# Patient Record
Sex: FEMALE | Race: WHITE | NOT HISPANIC OR LATINO | Employment: OTHER | URBAN - METROPOLITAN AREA
[De-identification: names, ages, dates, MRNs, and addresses within clinical notes are randomized per-mention and may not be internally consistent; named-entity substitution may affect disease eponyms.]

---

## 2017-02-06 ENCOUNTER — LAB VISIT (OUTPATIENT)
Dept: LAB | Facility: HOSPITAL | Age: 42
End: 2017-02-06
Attending: NURSE PRACTITIONER
Payer: MEDICARE

## 2017-02-06 DIAGNOSIS — E03.4 HYPOTHYROIDISM DUE TO ACQUIRED ATROPHY OF THYROID: ICD-10-CM

## 2017-02-06 PROCEDURE — 36415 COLL VENOUS BLD VENIPUNCTURE: CPT | Mod: PO

## 2017-02-06 PROCEDURE — 84443 ASSAY THYROID STIM HORMONE: CPT

## 2017-02-07 LAB — TSH SERPL DL<=0.005 MIU/L-ACNC: 2.71 UIU/ML

## 2017-02-07 RX ORDER — LEVOTHYROXINE SODIUM 137 UG/1
137 TABLET ORAL DAILY
Qty: 30 TABLET | Refills: 1 | Status: SHIPPED | OUTPATIENT
Start: 2017-02-07 | End: 2017-07-07 | Stop reason: SDUPTHER

## 2017-06-27 ENCOUNTER — OFFICE VISIT (OUTPATIENT)
Dept: FAMILY MEDICINE | Facility: CLINIC | Age: 42
End: 2017-06-27
Payer: MEDICARE

## 2017-06-27 VITALS
WEIGHT: 194 LBS | HEIGHT: 64 IN | BODY MASS INDEX: 33.12 KG/M2 | DIASTOLIC BLOOD PRESSURE: 80 MMHG | SYSTOLIC BLOOD PRESSURE: 152 MMHG | HEART RATE: 68 BPM | RESPIRATION RATE: 12 BRPM | TEMPERATURE: 98 F

## 2017-06-27 DIAGNOSIS — R10.31 RLQ ABDOMINAL PAIN: Primary | ICD-10-CM

## 2017-06-27 DIAGNOSIS — R19.7 DIARRHEA, UNSPECIFIED TYPE: ICD-10-CM

## 2017-06-27 PROCEDURE — 99214 OFFICE O/P EST MOD 30 MIN: CPT | Mod: S$GLB,,, | Performed by: NURSE PRACTITIONER

## 2017-06-27 NOTE — PROGRESS NOTES
"Subjective:       Patient ID: Jamila Lee is a 41 y.o. female.    Chief Complaint: one week lower abdomen pain, diarrhea with bad odor    HPI had her gallbladder removed a year ago. Since then having lower abdomen with cramping. This past week has had intense pain, cramping and diarrhea. States for the past week the minute she eats she has diarrhea. States watery diarrhea. States the smell is really bad. She states she was not exposed to anyone that has had diarrhea or illness. She has not been on antibiotics or steroids lately. A lot of nausea and a few episodes of vomiting. No urinary changes. No fever. She is not taking any medication for this. No blood in her stools. No other concerns. See ROS.    The following portion of the patients history was reviewed and updated as appropriate: allergies, current medications, past medical and surgical history. Past social history and problem list reviewed. Family PMH and Past social history reviewed. Tobacco, Illicit drug use reviewed.     Review of Systems  Constitutional: No fatigue or fever    Respiratory:   No SOB, Wheezing, cough  Cardiovascular:   No CP, Palpitations  Gastrointestinal:   See HPI  Genitourinary:   No dysuria, urgency or frequency. No change in bowels. No blood in stools.   Musculoskeletal:   No joint pain  No change in gait or coordination. .  Neurological:   No dizziness. No headaches  Hematological: No abnormal bruising or bleeding      Objective:     BP (!) 152/80 (BP Location: Left arm, Patient Position: Sitting, BP Method: Manual)   Pulse 68   Temp 98.3 °F (36.8 °C) (Oral)   Resp 12   Ht 5' 4" (1.626 m)   Wt 88 kg (194 lb 0.1 oz)   BMI 33.30 kg/m²      Physical Exam     Constitutional: oriented to person, place, and time. well-developed and well-nourished. She does not appear ill  Cardiovascular: Normal rate, regular rhythm and normal heart sounds.    Pulmonary/Chest: Effort normal and breath sounds normal. No respiratory distress. No " wheezes.   Abdominal: Soft. Bowel sounds are normal. No distension. Generalized tenderness  Neurological: oriented to person, place, and time.   Skin: Skin is warm and dry. No rashes or lesions  Psychiatric: Normal mood and affect.Behavior is normal. Judgment and thought content normal.   Assessment:       1. RLQ abdominal pain    2. Diarrhea, unspecified type        Plan:     Jamila was seen today for one week lower abdomen pain, diarrhea with bad odor.    Diagnoses and all orders for this visit:    RLQ abdominal pain:  Will get CT of the abdomen.   -      CT Abdomen Pelvis With Contrast; Future    Diarrhea, unspecified type: bland diet. Hydrate well. No medications at this time until CT results available.  -     CT Abdomen Pelvis With Contrast; Future    Continue current medication  Take medications only as prescribed  Healthy diet  Adequate rest  Adequate hydration  Avoid allergens  Avoid excessive caffeine

## 2017-06-28 ENCOUNTER — HOSPITAL ENCOUNTER (OUTPATIENT)
Dept: RADIOLOGY | Facility: HOSPITAL | Age: 42
Discharge: HOME | End: 2017-06-28
Attending: NURSE PRACTITIONER
Payer: MEDICARE

## 2017-06-28 DIAGNOSIS — R10.31 RLQ ABDOMINAL PAIN: ICD-10-CM

## 2017-06-28 DIAGNOSIS — R19.7 DIARRHEA, UNSPECIFIED TYPE: ICD-10-CM

## 2017-06-28 PROCEDURE — 74177 CT ABD & PELVIS W/CONTRAST: CPT | Mod: 26,,, | Performed by: RADIOLOGY

## 2017-06-28 PROCEDURE — 25500020 PHARM REV CODE 255: Mod: PO | Performed by: NURSE PRACTITIONER

## 2017-06-28 PROCEDURE — 74177 CT ABD & PELVIS W/CONTRAST: CPT | Mod: TC,PO

## 2017-06-28 RX ADMIN — IOHEXOL 30 ML: 350 INJECTION, SOLUTION INTRAVENOUS at 03:06

## 2017-06-28 RX ADMIN — IOHEXOL 100 ML: 350 INJECTION, SOLUTION INTRAVENOUS at 03:06

## 2017-06-29 ENCOUNTER — TELEPHONE (OUTPATIENT)
Dept: FAMILY MEDICINE | Facility: CLINIC | Age: 42
End: 2017-06-29

## 2017-06-29 DIAGNOSIS — K52.9 COLITIS: Primary | ICD-10-CM

## 2017-06-29 DIAGNOSIS — R10.30 LOWER ABDOMINAL PAIN: Primary | ICD-10-CM

## 2017-06-29 RX ORDER — PREDNISONE 20 MG/1
TABLET ORAL
Qty: 6 TABLET | Refills: 0 | Status: SHIPPED | OUTPATIENT
Start: 2017-06-29 | End: 2017-07-07 | Stop reason: DRUGHIGH

## 2017-06-29 NOTE — TELEPHONE ENCOUNTER
----- Message from Germaine Wesley NP sent at 6/29/2017  1:52 PM CDT -----  I want to see her Wednesday if she is not feeling better.

## 2017-06-29 NOTE — TELEPHONE ENCOUNTER
Spoke with pt. Pt stated as of right now, she is not feeling better and if she's not better, she would call to schedule to come in Wednesday.

## 2017-06-29 NOTE — TELEPHONE ENCOUNTER
----- Message from Rg Farmer sent at 6/29/2017  3:47 PM CDT -----  Contact: pt  Pt returning call, call placed to pod no answer  Call Back#[phone]  Thanks

## 2017-06-29 NOTE — TELEPHONE ENCOUNTER
Her CT showed that she has colitis, inflammation of the colon. She needs to see someone in the GI clinic and also needs to be on some steroids to decrease the inflammation. She also has two hernias. 1 Fat containing periumbilical hernia and 1 right upper abdomen fat-containing hernia. These are small and can be addressed at a later date. If her abdominal pain gets worse she needs to go to the ER. If she starts having blood in her stool or running fever she needs to go to the ER. I will send medication to the pharmacy and place referral.

## 2017-06-29 NOTE — TELEPHONE ENCOUNTER
----- Message from Janice Mondragon sent at 6/29/2017  9:35 AM CDT -----  Contact: Patient  Patient would like results of her test from yesterday. Please call her at [phone].

## 2017-06-29 NOTE — TELEPHONE ENCOUNTER
"Returned pt call. Pt previously informed of CT results and instructions per provider. Pt stated, "I've been taking my temperature, and it keeps going down. It's currently 96.1 orally, and I've had nothing cold to drink. My hands feel warm and sweaty. Can you ask Germaine what should I do?" Please advise.  "

## 2017-07-03 NOTE — TELEPHONE ENCOUNTER
I put in orders for labs, have her go get those drawn so we can check a white count and amylase, lipase level. The other option is for her to see someone, can see if [facility] an opening.

## 2017-07-05 NOTE — TELEPHONE ENCOUNTER
----- Message from Yazmin Pedraza sent at 7/5/2017 12:22 PM CDT -----  Patient missed a call from office regarding CT results please call [phone] (sxss)

## 2017-07-05 NOTE — TELEPHONE ENCOUNTER
Spoke to pt and she stated she is still having extreme stomach pain and diarrhea for 2 weeks now.  sched pt's labs for tomorrow and verified GI appt next week.   Informed pt we will call her with lab results as soon as there are processed.

## 2017-07-07 ENCOUNTER — LAB VISIT (OUTPATIENT)
Dept: LAB | Facility: HOSPITAL | Age: 42
End: 2017-07-07
Attending: NURSE PRACTITIONER
Payer: MEDICARE

## 2017-07-07 ENCOUNTER — OFFICE VISIT (OUTPATIENT)
Dept: FAMILY MEDICINE | Facility: CLINIC | Age: 42
End: 2017-07-07
Payer: MEDICARE

## 2017-07-07 VITALS
HEIGHT: 64 IN | SYSTOLIC BLOOD PRESSURE: 136 MMHG | DIASTOLIC BLOOD PRESSURE: 90 MMHG | TEMPERATURE: 98 F | BODY MASS INDEX: 33.34 KG/M2 | WEIGHT: 195.31 LBS | RESPIRATION RATE: 20 BRPM | HEART RATE: 60 BPM

## 2017-07-07 DIAGNOSIS — J30.1 ACUTE SEASONAL ALLERGIC RHINITIS DUE TO POLLEN: ICD-10-CM

## 2017-07-07 DIAGNOSIS — E03.4 HYPOTHYROIDISM DUE TO ACQUIRED ATROPHY OF THYROID: ICD-10-CM

## 2017-07-07 DIAGNOSIS — M62.830 LUMBAR PARASPINAL MUSCLE SPASM: ICD-10-CM

## 2017-07-07 DIAGNOSIS — K57.92 ACUTE DIVERTICULITIS OF INTESTINE: Primary | ICD-10-CM

## 2017-07-07 DIAGNOSIS — R10.30 LOWER ABDOMINAL PAIN: ICD-10-CM

## 2017-07-07 DIAGNOSIS — J04.0 LARYNGITIS: ICD-10-CM

## 2017-07-07 DIAGNOSIS — M62.838 MUSCLE SPASMS OF BOTH LOWER EXTREMITIES: ICD-10-CM

## 2017-07-07 LAB
ALBUMIN SERPL BCP-MCNC: 4.5 G/DL
ALP SERPL-CCNC: 65 U/L
ALT SERPL W/O P-5'-P-CCNC: 21 U/L
AMYLASE SERPL-CCNC: 60 U/L
ANION GAP SERPL CALC-SCNC: 11 MMOL/L
AST SERPL-CCNC: 25 U/L
BASOPHILS # BLD AUTO: 0.07 K/UL
BASOPHILS NFR BLD: 0.5 %
BILIRUB SERPL-MCNC: 0.4 MG/DL
BUN SERPL-MCNC: 10 MG/DL
CALCIUM SERPL-MCNC: 10.1 MG/DL
CHLORIDE SERPL-SCNC: 102 MMOL/L
CO2 SERPL-SCNC: 27 MMOL/L
CREAT SERPL-MCNC: 1 MG/DL
DIFFERENTIAL METHOD: ABNORMAL
EOSINOPHIL # BLD AUTO: 0.1 K/UL
EOSINOPHIL NFR BLD: 0.9 %
ERYTHROCYTE [DISTWIDTH] IN BLOOD BY AUTOMATED COUNT: 13.9 %
EST. GFR  (AFRICAN AMERICAN): >60 ML/MIN/1.73 M^2
EST. GFR  (NON AFRICAN AMERICAN): >60 ML/MIN/1.73 M^2
GLUCOSE SERPL-MCNC: 93 MG/DL
HCT VFR BLD AUTO: 42.2 %
HGB BLD-MCNC: 14.2 G/DL
LIPASE SERPL-CCNC: 31 U/L
LYMPHOCYTES # BLD AUTO: 6 K/UL
LYMPHOCYTES NFR BLD: 45.7 %
MCH RBC QN AUTO: 29.2 PG
MCHC RBC AUTO-ENTMCNC: 33.6 %
MCV RBC AUTO: 87 FL
MONOCYTES # BLD AUTO: 0.7 K/UL
MONOCYTES NFR BLD: 4.9 %
NEUTROPHILS # BLD AUTO: 6.3 K/UL
NEUTROPHILS NFR BLD: 47.5 %
PLATELET # BLD AUTO: 275 K/UL
PMV BLD AUTO: 12 FL
POTASSIUM SERPL-SCNC: 4.3 MMOL/L
PROT SERPL-MCNC: 8 G/DL
RBC # BLD AUTO: 4.87 M/UL
SODIUM SERPL-SCNC: 140 MMOL/L
WBC # BLD AUTO: 13.18 K/UL

## 2017-07-07 PROCEDURE — 85007 BL SMEAR W/DIFF WBC COUNT: CPT

## 2017-07-07 PROCEDURE — 80053 COMPREHEN METABOLIC PANEL: CPT

## 2017-07-07 PROCEDURE — 82150 ASSAY OF AMYLASE: CPT

## 2017-07-07 PROCEDURE — 83690 ASSAY OF LIPASE: CPT

## 2017-07-07 PROCEDURE — 85027 COMPLETE CBC AUTOMATED: CPT

## 2017-07-07 PROCEDURE — 36415 COLL VENOUS BLD VENIPUNCTURE: CPT | Mod: PO

## 2017-07-07 PROCEDURE — 99214 OFFICE O/P EST MOD 30 MIN: CPT | Mod: S$GLB,,, | Performed by: NURSE PRACTITIONER

## 2017-07-07 RX ORDER — DIAZEPAM 10 MG/1
10 TABLET ORAL 2 TIMES DAILY
Qty: 60 TABLET | Refills: 0 | Status: SHIPPED | OUTPATIENT
Start: 2017-07-07 | End: 2017-08-03 | Stop reason: SDUPTHER

## 2017-07-07 RX ORDER — METRONIDAZOLE 500 MG/1
500 TABLET ORAL EVERY 12 HOURS
Qty: 20 TABLET | Refills: 0 | Status: SHIPPED | OUTPATIENT
Start: 2017-07-07 | End: 2017-07-12 | Stop reason: SDUPTHER

## 2017-07-07 RX ORDER — PREDNISONE 20 MG/1
20 TABLET ORAL DAILY
Qty: 10 TABLET | Refills: 0 | Status: SHIPPED | OUTPATIENT
Start: 2017-07-07 | End: 2017-07-17

## 2017-07-07 RX ORDER — LEVOTHYROXINE SODIUM 137 UG/1
137 TABLET ORAL DAILY
Qty: 30 TABLET | Refills: 6 | Status: ON HOLD | OUTPATIENT
Start: 2017-07-07 | End: 2018-06-02

## 2017-07-07 RX ORDER — CIPROFLOXACIN 250 MG/1
250 TABLET, FILM COATED ORAL 2 TIMES DAILY
Qty: 20 TABLET | Refills: 0 | Status: SHIPPED | OUTPATIENT
Start: 2017-07-07 | End: 2017-07-12 | Stop reason: SDUPTHER
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
## 2017-07-10 ENCOUNTER — TELEPHONE (OUTPATIENT)
Dept: FAMILY MEDICINE | Facility: CLINIC | Age: 42
End: 2017-07-10

## 2017-07-10 NOTE — TELEPHONE ENCOUNTER
Pt stated understanding of lab result and she stated she received her abx from pharmacy and will cont to take.

## 2017-07-12 ENCOUNTER — INITIAL CONSULT (OUTPATIENT)
Dept: GASTROENTEROLOGY | Facility: CLINIC | Age: 42
End: 2017-07-12
Payer: MEDICARE

## 2017-07-12 VITALS
HEIGHT: 65 IN | DIASTOLIC BLOOD PRESSURE: 90 MMHG | BODY MASS INDEX: 33.61 KG/M2 | HEART RATE: 98 BPM | RESPIRATION RATE: 18 BRPM | WEIGHT: 201.75 LBS | SYSTOLIC BLOOD PRESSURE: 120 MMHG

## 2017-07-12 DIAGNOSIS — R39.198 DIFFICULTY URINATING: ICD-10-CM

## 2017-07-12 DIAGNOSIS — R10.30 LOWER ABDOMINAL PAIN: Primary | ICD-10-CM

## 2017-07-12 DIAGNOSIS — R35.0 URINARY FREQUENCY: ICD-10-CM

## 2017-07-12 DIAGNOSIS — Z86.010 HISTORY OF COLON POLYPS: ICD-10-CM

## 2017-07-12 DIAGNOSIS — Z80.0 FAMILY HISTORY OF COLON CANCER: ICD-10-CM

## 2017-07-12 DIAGNOSIS — K63.9 COLON WALL THICKENING: ICD-10-CM

## 2017-07-12 DIAGNOSIS — R19.7 DIARRHEA, UNSPECIFIED TYPE: ICD-10-CM

## 2017-07-12 DIAGNOSIS — R10.9 RIGHT SIDED ABDOMINAL PAIN: ICD-10-CM

## 2017-07-12 DIAGNOSIS — Z90.49 S/P CHOLECYSTECTOMY: ICD-10-CM

## 2017-07-12 DIAGNOSIS — R03.0 ELEVATED BLOOD PRESSURE READING: ICD-10-CM

## 2017-07-12 DIAGNOSIS — D72.829 LEUKOCYTOSIS, UNSPECIFIED TYPE: ICD-10-CM

## 2017-07-12 PROCEDURE — 99999 PR PBB SHADOW E&M-EST. PATIENT-LVL IV: CPT | Mod: PBBFAC,,, | Performed by: NURSE PRACTITIONER

## 2017-07-12 PROCEDURE — 99214 OFFICE O/P EST MOD 30 MIN: CPT | Mod: S$PBB,,, | Performed by: NURSE PRACTITIONER

## 2017-07-12 PROCEDURE — 99214 OFFICE O/P EST MOD 30 MIN: CPT | Mod: PBBFAC,PO | Performed by: NURSE PRACTITIONER

## 2017-07-12 RX ORDER — CIPROFLOXACIN 500 MG/1
500 TABLET ORAL EVERY 12 HOURS
Qty: 14 TABLET | Refills: 0 | Status: SHIPPED | OUTPATIENT
Start: 2017-07-12 | End: 2017-07-19

## 2017-07-12 RX ORDER — METRONIDAZOLE 500 MG/1
500 TABLET ORAL EVERY 8 HOURS
Qty: 21 TABLET | Refills: 0 | Status: SHIPPED | OUTPATIENT
Start: 2017-07-12 | End: 2017-07-19

## 2017-07-12 RX ORDER — METRONIDAZOLE 500 MG/1
500 TABLET ORAL EVERY 12 HOURS
Qty: 14 TABLET | Refills: 0 | Status: SHIPPED | OUTPATIENT
Start: 2017-07-12 | End: 2017-07-12 | Stop reason: SDUPTHER

## 2017-07-12 RX ORDER — DICYCLOMINE HYDROCHLORIDE 20 MG/1
20 TABLET ORAL EVERY 6 HOURS PRN
Qty: 60 TABLET | Refills: 3 | Status: SHIPPED | OUTPATIENT
Start: 2017-07-12 | End: 2017-08-11

## 2017-07-12 NOTE — PATIENT INSTRUCTIONS
Abdominal Pain    Abdominal pain is pain in the stomach or belly area. Everyone has this pain from time to time. In many cases it goes away on its own. But abdominal pain can sometimes be due to a serious problem, such as appendicitis. So its important to know when to seek help.  Causes of abdominal pain  There are many possible causes of abdominal pain. Common causes in adults include:  · Constipation, diarrhea, or gas  · Stomach acid flowing back up into the esophagus (acid reflux or heartburn)  · Severe acid reflux, called GERD (gastroesophageal reflux disease)  · A sore in the lining of the stomach or small intestine (peptic ulcer)  · Inflammation of the gallbladder, liver, or pancreas  · Gallstones or kidney stones  · Appendicitis   · Intestinal blockage   · An internal organ pushing through a muscle or other tissue (hernia)  · Urinary tract infections  · In women, menstrual cramps, fibroids, or endometriosis  · Inflammation or infection of the intestines  Diagnosing the cause of abdominal pain  Your healthcare provider will do a physical exam help find the cause of your pain. If needed, tests will be ordered. Belly pain has many possible causes. So it can be hard to find the reason for your pain. Giving details about your pain can help. Tell your provider where and when you feel the pain, and what makes it better or worse. Also let your provider know if you have other symptoms such as:  · Fever  · Tiredness  · Upset stomach (nausea)  · Vomiting  · Changes in bathroom habits  Treating abdominal pain  Some causes of pain need emergency medical treatment right away. These include appendicitis or a bowel blockage. Other problems can be treated with rest, fluids, or medicines. Your healthcare provider can give you specific instructions for treatment or self-care based on what is causing your pain.  If you have vomiting or diarrhea, sip water or other clear fluids. When you are ready to eat solid foods again,  start with small amounts of easy-to-digest, low-fat foods. These include apple sauce, toast, or crackers.   When to seek medical care  Call 911 or go to the hospital right away if you:  · Cant pass stool and are vomiting  · Are vomiting blood or have bloody diarrhea or black, tarry diarrhea  · Have chest, neck, or shoulder pain  · Feel like you might pass out  · Have pain in your shoulder blades with nausea  · Have sudden, severe belly pain  · Have new, severe pain unlike any you have felt before  · Have a belly that is rigid, hard, and tender to touch  Call your healthcare provider if you have:  · Pain for more than 5 days  · Bloating for more than 2 days  · Diarrhea for more than 5 days  · A fever of 100.4°F (38.0°C) or higher, or as directed by your provider  · Pain that gets worse  · Weight loss for no reason  · Continued lack of appetite  · Blood in your stool  How to prevent abdominal pain  Here are some tips to help prevent abdominal pain:  · Eat smaller amounts of food at one time.  · Avoid greasy, fried, or other high-fat foods.  · Avoid foods that give you gas.  · Exercise regularly.  · Drink plenty of fluids.  To help prevent GERD symptoms:  · Quit smoking.  · Reduce alcohol and certain foods that increase stomach acid.  · Avoid aspirin and over-the-counter pain and fever medicines (NSAIDS or nonsteroidal anti-inflammatory drugs), if possible  · Lose extra weight.  · Finish eating at least 2 hours before you go to bed or lie down.  · Raise the head of your bed.  Date Last Reviewed: 7/1/2016  © 7429-6459 [employer]. [address] [place] [address], [place] [address]. All rights reserved. This information is not intended as a substitute for professional medical care. Always follow your healthcare professional's instructions.        Uncertain Causes of Diarrhea (Adult)    Diarrhea is when stools are loose and watery. This can be caused by:  · Viral infections  · Bacterial infections  · Food  poisoning  · Parasites  · Irritable bowel syndrome (IBS)  · Inflammatory bowel diseases such as ulcerative colitis, Crohn's disease, and celiac disease  · Food intolerance, such as to lactose, the sugar found in milk and milk products  · Reaction to medicines like antibiotics, laxatives, cancer drugs, and antacids  Along with diarrhea, you may also have:  · Abdominal pain and cramping  · Nausea and vomiting  · Loss of bowel control  · Fever and chills  · Bloody stools  In some cases, antibiotics may help to treat diarrhea. You may have a stool sample test. This is done to see what is causing your diarrhea, and if antibiotics will help treat it. The results of a stool sample test may take up to 2 days. The healthcare provider may not give you antibiotics until he or she has the stool test results.  Diarrhea can cause dehydration. This is the loss of too much water and other fluids from the body. When this occurs, body fluid must be replaced. This can be done with oral rehydration solutions. Oral rehydration solutions are available at drugstores and grocery stores without a prescription.  Home care  Follow all instructions given by your healthcare provider. Rest at home for the next 24 hours, or until you feel better. Avoid caffeine, tobacco, and alcohol. These can make diarrhea, cramping, and pain worse.  If taking medicines:  · Dont take over-the-counter diarrhea or nausea medicines unless your healthcare provider tells you to.  · You may use acetaminophen or NSAID medicines like ibuprofen or naproxen to reduce pain and fever. Dont use these if you have chronic liver or kidney disease, or ever had a stomach ulcer or gastrointestinal bleeding. Don't use NSAID medicines if you are already taking one for another condition (like arthritis) or are on daily aspirin therapy (such as for heart disease or after a stroke). Talk with your healthcare provider first.  · If antibiotics were prescribed, be sure you take them  until they are finished. Dont stop taking them even when you feel better. Antibiotics must be taken as a full course.  To prevent the spread of illness:  · Remember that washing with soap and water and using alcohol-based  is the best way to prevent the spread of infection.  · Clean the toilet after each use.  · Wash your hands before eating.  · Wash your hands before and after preparing food. Keep in mind that people with diarrhea or vomiting should not prepare food for others.  · Wash your hands after using cutting boards, countertops, and knives that have been in contact with raw foods.  · Wash and then peel fruits and vegetables.  · Keep uncooked meats away from cooked and ready-to-eat foods.  · Use a food thermometer when cooking. Cook poultry to at least 165°F (74°C). Cook ground meat (beef, veal, pork, lamb) to at least 160°F (71°C). Cook fresh beef, veal, lamb, and pork to at least 145°F (63°C).  · Dont eat raw or undercooked eggs (poached or jerman side up), poultry, meat, or unpasteurized milk and juices.  Food and drinks  The main goal while treating vomiting or diarrhea is to prevent dehydration. This is done by taking small amounts of liquids often.  · Keep in mind that liquids are more important than food right now.  · Drink only small amounts of liquids at a time.  · Dont force yourself to eat, especially if you are having cramping, vomiting, or diarrhea. Dont eat large amounts at a time, even if you are hungry.  · If you eat, avoid fatty, greasy, spicy, or fried foods.  · Dont eat dairy foods or drink milk if you have diarrhea. These can make diarrhea worse.  During the first 24 hours you can try:  · Oral rehydration solutions. Do not use sports drinks. They have too much sugar and not enough electrolytes.  · Soft drinks without caffeine  · Ginger ale  · Water (plain or flavored)  · Decaf tea or coffee  · Clear broth, consommé, or bouillon  · Gelatin, popsicles, or frozen fruit juice  bars  The second 24 hours, if you are feeling better, you can add:  · Hot cereal, plain toast, bread, rolls, or crackers  · Plain noodles, rice, mashed potatoes, chicken noodle soup, or rice soup  · Unsweetened canned fruit (no pineapple)  · Bananas  As you recover:  · Limit fat intake to less than 15 grams per day. Dont eat margarine, butter, oils, mayonnaise, sauces, gravies, fried foods, peanut butter, meat, poultry, or fish.  · Limit fiber. Dont eat raw or cooked vegetables, fresh fruits except bananas, or bran cereals.  · Limit caffeine and chocolate.  · Limit dairy.  · Dont use spices or seasonings except salt.  · Go back to your normal diet over time, as you feel better and your symptoms improve.  · If the symptoms come back, go back to a simple diet or clear liquids.  Follow-up care  Follow up with your healthcare provider, or as advised. If a stool sample was taken or cultures were done, call the healthcare provider for the results as instructed.  Call 911  Call 911 if you have any of these symptoms:  · Trouble breathing  · Confusion  · Extreme drowsiness or trouble walking  · Loss of consciousness  · Rapid heart rate  · Chest pain  · Stiff neck  · Seizure  When to seek medical advice  Call your healthcare provider right away if any of these occur:  · Abdominal pain that gets worse  · Constant lower right abdominal pain  · Continued vomiting and inability to keep liquids down  · Diarrhea more than 5 times a day  · Blood in vomit or stool  · Dark urine or no urine for 8 hours, dry mouth and tongue, tiredness, weakness, or dizziness  · Drowsiness  · New rash  · You dont get better in 2 to 3 days  · Fever of 100.4°F (38°C) or higher that doesnt get lower with medicine  Date Last Reviewed: 1/3/2016  © 2608-7753 [employer]. [address] [place] [address], [place] [address]. All rights reserved. This information is not intended as a substitute for professional medical care. Always follow your  healthcare professional's instructions.

## 2017-07-12 NOTE — LETTER
July 12, 2017      Germaine Wesley, JASON  [address] [facility] [address]  [facility] [address]           [place] [facility] [address] [facility] [address] [place] [address]  Phone: [phone]          Patient: Jamila Lee   MR Number: [id number]   YOB: 1975   Date of Visit: 7/12/2017       Dear Germaine Wesley:    Thank you for referring Jamila Lee to me for evaluation. Attached you will find relevant portions of my assessment and plan of care.    If you have questions, please do not hesitate to call me. I look forward to following Jamila Lee along with you.    Sincerely,    Luz Vazquez, [facility]    Enclosure  CC:  No Recipients    If you would like to receive this communication electronically, please contact [email or web address] or [phone] to request more information on [employer] access.    For providers and/or their staff who would like to refer a patient to [facility], please contact us through our one-stop-shop provider referral line, [facility], at [phone].    If you feel you have received this communication in error or would no longer like to receive these types of communications, please e-mail [email or web address]

## 2017-07-17 ENCOUNTER — SURGERY (OUTPATIENT)
Age: 42
End: 2017-07-17

## 2017-07-17 ENCOUNTER — ANESTHESIA EVENT (OUTPATIENT)
Dept: ENDOSCOPY | Facility: HOSPITAL | Age: 42
End: 2017-07-17
Payer: MEDICARE

## 2017-07-17 ENCOUNTER — ANESTHESIA (OUTPATIENT)
Dept: ENDOSCOPY | Facility: HOSPITAL | Age: 42
End: 2017-07-17
Payer: MEDICARE

## 2017-07-17 ENCOUNTER — HOSPITAL ENCOUNTER (OUTPATIENT)
Facility: HOSPITAL | Age: 42
Discharge: HOME | End: 2017-07-17
Attending: INTERNAL MEDICINE | Admitting: INTERNAL MEDICINE
Payer: MEDICARE

## 2017-07-17 VITALS
HEART RATE: 88 BPM | RESPIRATION RATE: 20 BRPM | HEIGHT: 64 IN | TEMPERATURE: 98 F | OXYGEN SATURATION: 99 % | DIASTOLIC BLOOD PRESSURE: 70 MMHG | SYSTOLIC BLOOD PRESSURE: 121 MMHG | BODY MASS INDEX: 34.31 KG/M2 | WEIGHT: 201 LBS

## 2017-07-17 VITALS — RESPIRATION RATE: 11 BRPM

## 2017-07-17 DIAGNOSIS — R19.4 CHANGE IN BOWEL HABITS: ICD-10-CM

## 2017-07-17 PROCEDURE — 45378 DIAGNOSTIC COLONOSCOPY: CPT | Mod: 53,,, | Performed by: INTERNAL MEDICINE

## 2017-07-17 PROCEDURE — 25000003 PHARM REV CODE 250: Mod: PO | Performed by: INTERNAL MEDICINE

## 2017-07-17 PROCEDURE — 37000008 HC ANESTHESIA 1ST 15 MINUTES: Mod: PO | Performed by: INTERNAL MEDICINE

## 2017-07-17 PROCEDURE — 37000009 HC ANESTHESIA EA ADD 15 MINS: Mod: PO | Performed by: INTERNAL MEDICINE

## 2017-07-17 PROCEDURE — D9220A PRA ANESTHESIA: Mod: ANES,,, | Performed by: ANESTHESIOLOGY

## 2017-07-17 PROCEDURE — 25000003 PHARM REV CODE 250: Mod: PO | Performed by: NURSE ANESTHETIST, CERTIFIED REGISTERED

## 2017-07-17 PROCEDURE — 45330 DIAGNOSTIC SIGMOIDOSCOPY: CPT | Mod: PO | Performed by: INTERNAL MEDICINE

## 2017-07-17 PROCEDURE — 45378 DIAGNOSTIC COLONOSCOPY: CPT | Mod: 74,PO | Performed by: INTERNAL MEDICINE

## 2017-07-17 PROCEDURE — D9220A PRA ANESTHESIA: Mod: CRNA,,, | Performed by: NURSE ANESTHETIST, CERTIFIED REGISTERED

## 2017-07-17 PROCEDURE — 63600175 PHARM REV CODE 636 W HCPCS: Mod: PO | Performed by: NURSE ANESTHETIST, CERTIFIED REGISTERED

## 2017-07-17 RX ORDER — LIDOCAINE HCL/PF 100 MG/5ML
SYRINGE (ML) INTRAVENOUS
Status: DISCONTINUED | OUTPATIENT
Start: 2017-07-17 | End: 2017-07-17

## 2017-07-17 RX ORDER — SODIUM CHLORIDE, SODIUM LACTATE, POTASSIUM CHLORIDE, CALCIUM CHLORIDE 600; 310; 30; 20 MG/100ML; MG/100ML; MG/100ML; MG/100ML
INJECTION, SOLUTION INTRAVENOUS CONTINUOUS
Status: DISCONTINUED | OUTPATIENT
Start: 2017-07-17 | End: 2017-07-17 | Stop reason: HOSPADM

## 2017-07-17 RX ORDER — PROPOFOL 10 MG/ML
VIAL (ML) INTRAVENOUS
Status: DISCONTINUED | OUTPATIENT
Start: 2017-07-17 | End: 2017-07-17

## 2017-07-17 RX ADMIN — PROPOFOL 30 MG: 10 INJECTION, EMULSION INTRAVENOUS at 12:07

## 2017-07-17 RX ADMIN — LIDOCAINE HYDROCHLORIDE 100 MG: 20 INJECTION, SOLUTION INTRAVENOUS at 12:07

## 2017-07-17 RX ADMIN — SODIUM CHLORIDE, SODIUM LACTATE, POTASSIUM CHLORIDE, AND CALCIUM CHLORIDE: .6; .31; .03; .02 INJECTION, SOLUTION INTRAVENOUS at 12:07

## 2017-07-17 RX ADMIN — KETAMINE HYDROCHLORIDE 25 MG: 100 INJECTION, SOLUTION, CONCENTRATE INTRAMUSCULAR; INTRAVENOUS at 12:07

## 2017-07-17 NOTE — ANESTHESIA PREPROCEDURE EVALUATION
07/17/2017  Jamila Lee is a 41 y.o., female.    Anesthesia Evaluation      I have reviewed the Medications.     Review of Systems  Anesthesia Hx:  No problems with previous Anesthesia   Social:  Smoker    Cardiovascular:   hyperlipidemia    Pulmonary:  Pulmonary Normal    Renal/:  Renal/ Normal     Hepatic/GI:  Hepatic/GI Normal    Neurological:   Chronic Pain Syndrome (methadone 10 mg BID, Dilaudid 4 mg QID, Valium 10 mg)   Endocrine:   Hypothyroidism        Physical Exam  General:  Well nourished    Airway/Jaw/Neck:  Airway Findings: Mouth Opening: Normal Tongue: Normal  General Airway Assessment: Adult, Average  Mallampati: II  Jaw/Neck Findings:  Neck ROM: Normal ROM       Chest/Lungs:  Chest/Lungs Findings: Clear to auscultation, Normal Respiratory Rate     Heart/Vascular:  Heart Findings: Rate: Normal  Rhythm: Regular Rhythm  Sounds: Normal  Heart murmur: negative       Mental Status:  Mental Status Findings:  Cooperative, Alert and Oriented         Anesthesia Plan  Type of Anesthesia, risks & benefits discussed:  Anesthesia Type:  general  Patient's Preference:   Intra-op Monitoring Plan:   Intra-op Monitoring Plan Comments:   Post Op Pain Control Plan:   Post Op Pain Control Plan Comments:   Induction:   IV  Beta Blocker:  Patient is not currently on a Beta-Blocker (No further documentation required).       Informed Consent: Patient understands risks and agrees with Anesthesia plan.  Questions answered. Anesthesia consent signed with patient.  ASA Score: 3     Day of Surgery Review of History & Physical:        Anesthesia Plan Notes: Propofol general.        Ready For Surgery From Anesthesia Perspective.

## 2017-07-17 NOTE — TRANSFER OF CARE
"Anesthesia Transfer of Care Note    Patient: Jamila Lee    Procedure(s) Performed: Procedure(s) (LRB):  COLONOSCOPY (N/A)    Patient location: PACU    Anesthesia Type: general    Transport from OR: Transported from OR on room air with adequate spontaneous ventilation    Post pain: adequate analgesia    Post assessment: no apparent anesthetic complications and tolerated procedure well    Post vital signs: stable    Level of consciousness: awake and alert    Nausea/Vomiting: no nausea/vomiting    Complications: none    Transfer of care protocol was followed      Last vitals:   Visit Vitals  /71 (BP Location: Right arm, Patient Position: Lying, BP Method: Automatic)   Pulse 70   Temp 36.6 °C (97.9 °F) (Temporal)   Resp 18   Ht 5' 4" (1.626 m)   Wt 91.2 kg (201 lb)   SpO2 100%   Breastfeeding? No   BMI 34.50 kg/m²     "

## 2017-07-17 NOTE — DISCHARGE SUMMARY
Discharge Note  Short Stay      SUMMARY     Admit Date: 7/17/2017    Attending Physician: Regulo Elizabeth MD     Discharge Physician: Regulo Elizabeth MD    Discharge Date: 7/17/2017 12:39 PM    Final Diagnosis: RLQ abdominal pain [R10.31]  Diarrhea, unspecified type [R19.7]  History of colon polyps [Z86.010]  Colon wall thickening [K63.9]    Disposition: HOME OR SELF CARE    Patient Instructions:   Current Discharge Medication List      CONTINUE these medications which have NOT CHANGED    Details   diazePAM (VALIUM) 10 MG Tab Take 1 tablet (10 mg total) by mouth 2 (two) times daily.  Qty: 60 tablet, Refills: 0      dicyclomine (BENTYL) 20 mg tablet Take 1 tablet (20 mg total) by mouth every 6 (six) hours as needed (abdominal cramping/pain).  Qty: 60 tablet, Refills: 3    Associated Diagnoses: Lower abdominal pain      HYDROmorphone (DILAUDID) 4 MG tablet Take 1 tablet by mouth every 6 (six) hours as needed.  Refills: 0      methadone (DOLOPHINE) 10 MG tablet Take 10 mg by mouth 2 (two) times daily.      ciprofloxacin HCl (CIPRO) 500 MG tablet Take 1 tablet (500 mg total) by mouth every 12 (twelve) hours.  Qty: 14 tablet, Refills: 0    Associated Diagnoses: Lower abdominal pain; Colon wall thickening; Leukocytosis, unspecified type      clobetasol (TEMOVATE) 0.05 % external solution 1 application once daily. Apply to affected area as directed  Refills: 2      ketoconazole (NIZORAL) 2 % shampoo Apply topically once daily.  Qty: 120 mL, Refills: 3      levothyroxine (SYNTHROID) 137 MCG Tab tablet Take 1 tablet (137 mcg total) by mouth once daily.  Qty: 30 tablet, Refills: 6      metronidazole (FLAGYL) 500 MG tablet Take 1 tablet (500 mg total) by mouth every 8 (eight) hours.  Qty: 21 tablet, Refills: 0    Associated Diagnoses: Lower abdominal pain; Colon wall thickening; Leukocytosis, unspecified type      predniSONE (DELTASONE) 20 MG tablet Take 1 tablet (20 mg total) by mouth once daily.  Qty: 10 tablet,  Refills: 0      rosuvastatin (CRESTOR) 20 MG tablet Take 1 tablet (20 mg total) by mouth once daily.  Qty: 30 tablet, Refills: 3             Discharge Procedure Orders (must include Diet, Follow-up, Activity)    Follow Up:  Follow up with PCP as previously scheduled  Resume routine diet.  Activity as tolerated.    No driving day of procedure.

## 2017-07-17 NOTE — PLAN OF CARE
Awake, alert and tolerating po fluids well. No apparent distress noted. Meets the department guidelines for discharge. Instructions reviewed with patient and friend,verbal understanding expressed. Family/friend to drive patient home.

## 2017-07-17 NOTE — ANESTHESIA POSTPROCEDURE EVALUATION
"Anesthesia Post Evaluation    Patient: Jamila Lee    Procedure(s) Performed: Procedure(s) (LRB):  COLONOSCOPY (N/A)    Final Anesthesia Type: general  Patient location during evaluation: PACU  Patient participation: Yes- Able to Participate  Level of consciousness: awake and alert and oriented  Post-procedure vital signs: reviewed and stable  Pain management: adequate  Airway patency: patent  PONV status at discharge: No PONV  Anesthetic complications: no      Cardiovascular status: hemodynamically stable and blood pressure returned to baseline  Respiratory status: unassisted, spontaneous ventilation and room air  Hydration status: euvolemic  Follow-up not needed.        Visit Vitals  /70   Pulse 88   Temp 36.4 °C (97.6 °F)   Resp 20   Ht 5' 4" (1.626 m)   Wt 91.2 kg (201 lb)   SpO2 99%   Breastfeeding? No   BMI 34.50 kg/m²       Pain/Ayse Score: Pain Assessment Performed: Yes (7/17/2017 12:40 PM)  Presence of Pain: complains of pain/discomfort (7/17/2017 12:40 PM)  Ayse Score: 10 (7/17/2017 12:40 PM)      "
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
## 2017-07-19 ENCOUNTER — TELEPHONE (OUTPATIENT)
Dept: GASTROENTEROLOGY | Facility: CLINIC | Age: 42
End: 2017-07-19

## 2017-07-19 NOTE — TELEPHONE ENCOUNTER
----- Message from Nilson CONNOR Frisard sent at 7/18/2017 12:51 PM CDT -----  Contact: same  Patient called in and stated that her prep for her procedure that was scheduled for yesterday Monday 7/17 did not work and she was supposed to call back today to reschedule.  Patient call back number is [phone]

## 2017-07-19 NOTE — TELEPHONE ENCOUNTER
Pt has been scheduled for colon on 7/25. Reviewed detailed instructions on colon prep mg citrate. Pt is aware I will be mailing instructions and confirmation letter.

## 2017-07-25 ENCOUNTER — ANESTHESIA (OUTPATIENT)
Dept: ENDOSCOPY | Facility: HOSPITAL | Age: 42
End: 2017-07-25
Payer: MEDICARE

## 2017-07-25 ENCOUNTER — HOSPITAL ENCOUNTER (OUTPATIENT)
Facility: HOSPITAL | Age: 42
Discharge: HOME | End: 2017-07-25
Attending: INTERNAL MEDICINE | Admitting: INTERNAL MEDICINE
Payer: MEDICARE

## 2017-07-25 ENCOUNTER — SURGERY (OUTPATIENT)
Age: 42
End: 2017-07-25

## 2017-07-25 ENCOUNTER — ANESTHESIA EVENT (OUTPATIENT)
Dept: ENDOSCOPY | Facility: HOSPITAL | Age: 42
End: 2017-07-25
Payer: MEDICARE

## 2017-07-25 VITALS
BODY MASS INDEX: 33.49 KG/M2 | DIASTOLIC BLOOD PRESSURE: 62 MMHG | HEART RATE: 66 BPM | RESPIRATION RATE: 16 BRPM | SYSTOLIC BLOOD PRESSURE: 107 MMHG | TEMPERATURE: 98 F | HEIGHT: 65 IN | OXYGEN SATURATION: 95 % | WEIGHT: 201 LBS

## 2017-07-25 VITALS — RESPIRATION RATE: 16 BRPM

## 2017-07-25 DIAGNOSIS — R19.7 DIARRHEA: ICD-10-CM

## 2017-07-25 PROCEDURE — 45385 COLONOSCOPY W/LESION REMOVAL: CPT | Mod: PO | Performed by: INTERNAL MEDICINE

## 2017-07-25 PROCEDURE — 37000009 HC ANESTHESIA EA ADD 15 MINS: Mod: PO | Performed by: INTERNAL MEDICINE

## 2017-07-25 PROCEDURE — 25000003 PHARM REV CODE 250: Mod: PO | Performed by: INTERNAL MEDICINE

## 2017-07-25 PROCEDURE — 88305 TISSUE EXAM BY PATHOLOGIST: CPT | Mod: 59 | Performed by: PATHOLOGY

## 2017-07-25 PROCEDURE — D9220A PRA ANESTHESIA: Mod: CRNA,,, | Performed by: NURSE ANESTHETIST, CERTIFIED REGISTERED

## 2017-07-25 PROCEDURE — D9220A PRA ANESTHESIA: Mod: ANES,,, | Performed by: ANESTHESIOLOGY

## 2017-07-25 PROCEDURE — 88305 TISSUE EXAM BY PATHOLOGIST: CPT | Mod: 26,,, | Performed by: PATHOLOGY

## 2017-07-25 PROCEDURE — 45385 COLONOSCOPY W/LESION REMOVAL: CPT | Mod: ,,, | Performed by: INTERNAL MEDICINE

## 2017-07-25 PROCEDURE — 45380 COLONOSCOPY AND BIOPSY: CPT | Mod: PO | Performed by: INTERNAL MEDICINE

## 2017-07-25 PROCEDURE — 27201089 HC SNARE, DISP (ANY): Mod: PO | Performed by: INTERNAL MEDICINE

## 2017-07-25 PROCEDURE — 45380 COLONOSCOPY AND BIOPSY: CPT | Mod: 59,,, | Performed by: INTERNAL MEDICINE

## 2017-07-25 PROCEDURE — 63600175 PHARM REV CODE 636 W HCPCS: Mod: PO | Performed by: NURSE ANESTHETIST, CERTIFIED REGISTERED

## 2017-07-25 PROCEDURE — 37000008 HC ANESTHESIA 1ST 15 MINUTES: Mod: PO | Performed by: INTERNAL MEDICINE

## 2017-07-25 PROCEDURE — 27201012 HC FORCEPS, HOT/COLD, DISP: Mod: PO | Performed by: INTERNAL MEDICINE

## 2017-07-25 RX ORDER — PROPOFOL 10 MG/ML
VIAL (ML) INTRAVENOUS
Status: DISCONTINUED | OUTPATIENT
Start: 2017-07-25 | End: 2017-07-25

## 2017-07-25 RX ORDER — SODIUM CHLORIDE, SODIUM LACTATE, POTASSIUM CHLORIDE, CALCIUM CHLORIDE 600; 310; 30; 20 MG/100ML; MG/100ML; MG/100ML; MG/100ML
INJECTION, SOLUTION INTRAVENOUS CONTINUOUS
Status: DISCONTINUED | OUTPATIENT
Start: 2017-07-25 | End: 2017-07-25 | Stop reason: HOSPADM

## 2017-07-25 RX ORDER — LIDOCAINE HCL/PF 100 MG/5ML
SYRINGE (ML) INTRAVENOUS
Status: DISCONTINUED | OUTPATIENT
Start: 2017-07-25 | End: 2017-07-25

## 2017-07-25 RX ADMIN — LIDOCAINE HYDROCHLORIDE 75 MG: 20 INJECTION, SOLUTION INTRAVENOUS at 09:07

## 2017-07-25 RX ADMIN — PROPOFOL 30 MG: 10 INJECTION, EMULSION INTRAVENOUS at 09:07

## 2017-07-25 RX ADMIN — SODIUM CHLORIDE, SODIUM LACTATE, POTASSIUM CHLORIDE, AND CALCIUM CHLORIDE: .6; .31; .03; .02 INJECTION, SOLUTION INTRAVENOUS at 08:07

## 2017-07-25 RX ADMIN — PROPOFOL 40 MG: 10 INJECTION, EMULSION INTRAVENOUS at 09:07

## 2017-07-25 RX ADMIN — PROPOFOL 100 MG: 10 INJECTION, EMULSION INTRAVENOUS at 09:07

## 2017-07-25 NOTE — ANESTHESIA PREPROCEDURE EVALUATION
07/25/2017  Jamila Lee is a 41 y.o., female.    Pre-op Assessment    I have reviewed the Patient Summary Reports.     I have reviewed the Nursing Notes.   I have reviewed the Medications.     Review of Systems  Anesthesia Hx:  No problems with previous Anesthesia    Social:  Smoker    Cardiovascular:   Denies CABG/stent.   Denies Angina. hyperlipidemia    Pulmonary:  Pulmonary Normal    Renal/:  Renal/ Normal     Hepatic/GI:  Hepatic/GI Normal    Musculoskeletal:   Arthritis   Spine Disorders:    Neurological:   Chronic Pain Syndrome (methadone 10 mg BID, Dilaudid 4 mg QID, Valium 10 mg)   Endocrine:   Hypothyroidism        Physical Exam  General:  Well nourished    Airway/Jaw/Neck:  Airway Findings: Mouth Opening: Normal Tongue: Normal  General Airway Assessment: Adult, Average  Mallampati: II  Jaw/Neck Findings:  Neck ROM: Normal ROM       Chest/Lungs:  Chest/Lungs Findings: Clear to auscultation, Normal Respiratory Rate     Heart/Vascular:  Heart Findings: Rate: Normal  Rhythm: Regular Rhythm  Sounds: Normal  Heart murmur: negative       Mental Status:  Mental Status Findings:  Cooperative, Alert and Oriented         Anesthesia Plan  Type of Anesthesia, risks & benefits discussed:  Anesthesia Type:  general  Patient's Preference:   Intra-op Monitoring Plan: standard ASA monitors  Intra-op Monitoring Plan Comments:   Post Op Pain Control Plan:   Post Op Pain Control Plan Comments:   Induction:   IV  Beta Blocker:  Patient is not currently on a Beta-Blocker (No further documentation required).       Informed Consent: Patient understands risks and agrees with Anesthesia plan.  Questions answered. Anesthesia consent signed with patient.  ASA Score: 3     Day of Surgery Review of History & Physical: I have interviewed and examined the patient. I have reviewed the patient's H&P dated:  There are no  significant changes.      Anesthesia Plan Notes: Propofol general.        Ready For Surgery From Anesthesia Perspective.

## 2017-07-25 NOTE — TRANSFER OF CARE
"Anesthesia Transfer of Care Note    Patient: Jamila Lee    Procedure(s) Performed: Procedure(s) (LRB):  COLONOSCOPY (N/A)    Patient location: PACU    Anesthesia Type: general    Transport from OR: Transported from OR on room air with adequate spontaneous ventilation    Post pain: adequate analgesia    Post assessment: no apparent anesthetic complications    Post vital signs: stable    Level of consciousness: awake    Nausea/Vomiting: no nausea/vomiting    Complications: none    Transfer of care protocol was followed      Last vitals:   Visit Vitals  BP (!) 91/54 (BP Location: Left arm, Patient Position: Lying, BP Method: Automatic)   Pulse 71   Temp 36.6 °C (97.8 °F) (Temporal)   Resp 16   Ht 5' 4.5" (1.638 m)   Wt 91.2 kg (201 lb)   SpO2 96%   Breastfeeding? No   BMI 33.97 kg/m²     "

## 2017-07-25 NOTE — ANESTHESIA POSTPROCEDURE EVALUATION
"Anesthesia Post Evaluation    Patient: Jamila Lee    Procedure(s) Performed: Procedure(s) (LRB):  COLONOSCOPY (N/A)    Final Anesthesia Type: general  Patient location during evaluation: PACU  Patient participation: Yes- Able to Participate  Level of consciousness: awake and alert and oriented  Post-procedure vital signs: reviewed and stable  Pain management: adequate  Airway patency: patent  PONV status at discharge: No PONV  Anesthetic complications: no      Cardiovascular status: hemodynamically stable  Respiratory status: unassisted, spontaneous ventilation and room air  Hydration status: euvolemic  Follow-up not needed.        Visit Vitals  BP (!) 91/54 (BP Location: Left arm, Patient Position: Lying, BP Method: Automatic)   Pulse 71   Temp 36.6 °C (97.8 °F) (Temporal)   Resp 16   Ht 5' 4.5" (1.638 m)   Wt 91.2 kg (201 lb)   SpO2 96%   Breastfeeding? No   BMI 33.97 kg/m²       Pain/Ayse Score: Pain Assessment Performed: Yes (7/25/2017  9:45 AM)  Presence of Pain: denies (7/25/2017  9:45 AM)  Ayse Score: 8 (7/25/2017  9:44 AM)      "
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
## 2017-07-25 NOTE — DISCHARGE SUMMARY
Discharge Note  Short Stay      SUMMARY     Admit Date: 7/25/2017    Attending Physician: Regulo Elizabeth MD     Discharge Physician: Regulo Elizabeth MD    Discharge Date: 7/25/2017 9:44 AM    Final Diagnosis: Lower abdominal pain [R10.30]  Diarrhea, unspecified type [R19.7]  Abnormal CT scan, colon [R93.3]    Disposition: HOME OR SELF CARE    Patient Instructions:   Current Discharge Medication List      CONTINUE these medications which have NOT CHANGED    Details   diazePAM (VALIUM) 10 MG Tab Take 1 tablet (10 mg total) by mouth 2 (two) times daily.  Qty: 60 tablet, Refills: 0      dicyclomine (BENTYL) 20 mg tablet Take 1 tablet (20 mg total) by mouth every 6 (six) hours as needed (abdominal cramping/pain).  Qty: 60 tablet, Refills: 3    Associated Diagnoses: Lower abdominal pain      HYDROmorphone (DILAUDID) 4 MG tablet Take 1 tablet by mouth every 6 (six) hours as needed.  Refills: 0      levothyroxine (SYNTHROID) 137 MCG Tab tablet Take 1 tablet (137 mcg total) by mouth once daily.  Qty: 30 tablet, Refills: 6      methadone (DOLOPHINE) 10 MG tablet Take 10 mg by mouth 2 (two) times daily.      rosuvastatin (CRESTOR) 20 MG tablet Take 1 tablet (20 mg total) by mouth once daily.  Qty: 30 tablet, Refills: 3      clobetasol (TEMOVATE) 0.05 % external solution 1 application once daily. Apply to affected area as directed  Refills: 2      ketoconazole (NIZORAL) 2 % shampoo Apply topically once daily.  Qty: 120 mL, Refills: 3             Discharge Procedure Orders (must include Diet, Follow-up, Activity)    Follow Up:  Follow up with PCP as previously scheduled  Resume routine diet.  Activity as tolerated.    No driving day of procedure.

## 2017-08-07 RX ORDER — DIAZEPAM 10 MG/1
10 TABLET ORAL 2 TIMES DAILY
Qty: 60 TABLET | Refills: 2 | Status: SHIPPED | OUTPATIENT
Start: 2017-08-07 | End: 2017-10-24 | Stop reason: SDUPTHER

## 2017-08-14 ENCOUNTER — TELEPHONE (OUTPATIENT)
Dept: GASTROENTEROLOGY | Facility: CLINIC | Age: 42
End: 2017-08-14

## 2017-08-14 NOTE — TELEPHONE ENCOUNTER
----- Message from Nilson Teixeira sent at 8/14/2017  9:39 AM CDT -----  Contact: same  Patient called in and stated has not had a bowel movement since her colonoscopy on 7/25.  Patient stated she has tried OTC laxatives with no help.  Patient call back number is [phone]

## 2017-08-14 NOTE — TELEPHONE ENCOUNTER
S/w pt she has not had a bowel movement since the day of her prep on July 24. I explained to pt this can happen from going through the whole prep that it can throw off your regular bowel movement routine. Pt stated she has already tried mg citrate, fleets enemas, Dulcolax supp and dulcolax tablets on several occassions with no results at all. I recommended since pt has already tried and failed all the other options that the next thing to try is a golytely prep and then miralax daily till she gets back on routine. I also recommended she take a probiotic. Pt verbalized understanding.

## 2017-08-14 NOTE — TELEPHONE ENCOUNTER
----- Message from Desiree Marks sent at 8/14/2017 11:21 AM CDT -----  Contact: self  Returning your call. Please call back at [phone] (najh)

## 2017-10-24 RX ORDER — DIAZEPAM 10 MG/1
10 TABLET ORAL 2 TIMES DAILY
Qty: 60 TABLET | Refills: 2 | Status: SHIPPED | OUTPATIENT
Start: 2017-10-24 | End: 2018-01-24 | Stop reason: SDUPTHER

## 2017-11-03 RX ORDER — DICYCLOMINE HYDROCHLORIDE 20 MG/1
TABLET ORAL
Refills: 3 | COMMUNITY
Start: 2017-10-01 | End: 2017-11-03 | Stop reason: SDUPTHER

## 2017-11-03 RX ORDER — DICYCLOMINE HYDROCHLORIDE 20 MG/1
20 TABLET ORAL EVERY 8 HOURS PRN
Qty: 50 TABLET | Refills: 3 | Status: SHIPPED | OUTPATIENT
Start: 2017-11-03 | End: 2018-04-02 | Stop reason: SDUPTHER

## 2017-11-03 NOTE — TELEPHONE ENCOUNTER
----- Message from Leeann Laura sent at 11/3/2017  1:48 PM CDT -----  Contact: [facility] [phone] and fax [phone]  [facility]  To get a refill on Dicyclomine 20 mg the patient is out the medication.

## 2017-11-22 ENCOUNTER — TELEPHONE (OUTPATIENT)
Dept: GASTROENTEROLOGY | Facility: CLINIC | Age: 42
End: 2017-11-22

## 2017-11-22 NOTE — TELEPHONE ENCOUNTER
----- Message from Pricila Caro sent at 11/22/2017  4:44 PM CST -----  Contact: [facility]  Just received a fax for the attached patient but didn't approve or deny the script.  Please call [phone].  Thanks

## 2018-01-24 RX ORDER — DIAZEPAM 10 MG/1
10 TABLET ORAL 2 TIMES DAILY
Qty: 60 TABLET | Refills: 2 | Status: SHIPPED | OUTPATIENT
Start: 2018-01-24 | End: 2018-04-24 | Stop reason: SDUPTHER

## 2018-03-05 ENCOUNTER — OFFICE VISIT (OUTPATIENT)
Dept: FAMILY MEDICINE | Facility: CLINIC | Age: 43
End: 2018-03-05
Payer: MEDICARE

## 2018-03-05 VITALS
HEART RATE: 83 BPM | SYSTOLIC BLOOD PRESSURE: 110 MMHG | DIASTOLIC BLOOD PRESSURE: 84 MMHG | RESPIRATION RATE: 16 BRPM | WEIGHT: 212.31 LBS | OXYGEN SATURATION: 96 % | BODY MASS INDEX: 36.25 KG/M2 | TEMPERATURE: 99 F | HEIGHT: 64 IN

## 2018-03-05 DIAGNOSIS — R10.84 GENERALIZED ABDOMINAL PAIN: ICD-10-CM

## 2018-03-05 DIAGNOSIS — G89.21 CHRONIC PAIN DUE TO INJURY: ICD-10-CM

## 2018-03-05 DIAGNOSIS — R19.7 DIARRHEA, UNSPECIFIED TYPE: ICD-10-CM

## 2018-03-05 DIAGNOSIS — E03.4 HYPOTHYROIDISM DUE TO ACQUIRED ATROPHY OF THYROID: ICD-10-CM

## 2018-03-05 DIAGNOSIS — F51.02 ADJUSTMENT INSOMNIA: ICD-10-CM

## 2018-03-05 DIAGNOSIS — F32.A DEPRESSION, UNSPECIFIED DEPRESSION TYPE: ICD-10-CM

## 2018-03-05 DIAGNOSIS — G89.21 CHRONIC PAIN DUE TO TRAUMA: ICD-10-CM

## 2018-03-05 DIAGNOSIS — R10.9 ABDOMINAL CRAMPING: ICD-10-CM

## 2018-03-05 DIAGNOSIS — E78.2 MIXED HYPERLIPIDEMIA: ICD-10-CM

## 2018-03-05 DIAGNOSIS — R11.2 NAUSEA AND VOMITING, INTRACTABILITY OF VOMITING NOT SPECIFIED, UNSPECIFIED VOMITING TYPE: ICD-10-CM

## 2018-03-05 PROCEDURE — 99214 OFFICE O/P EST MOD 30 MIN: CPT | Mod: S$GLB,,, | Performed by: NURSE PRACTITIONER

## 2018-03-05 RX ORDER — TEMAZEPAM 30 MG/1
30 CAPSULE ORAL NIGHTLY PRN
Qty: 30 CAPSULE | Refills: 3 | Status: SHIPPED | OUTPATIENT
Start: 2018-03-05 | End: 2018-06-28 | Stop reason: SDUPTHER

## 2018-03-05 RX ORDER — GABAPENTIN 600 MG/1
600 TABLET ORAL 3 TIMES DAILY
Status: ON HOLD | COMMUNITY
Start: 2018-02-15 | End: 2018-06-02

## 2018-03-05 RX ORDER — FLUOXETINE HYDROCHLORIDE 20 MG/1
20 CAPSULE ORAL DAILY
Qty: 30 CAPSULE | Refills: 3 | Status: SHIPPED | OUTPATIENT
Start: 2018-03-05 | End: 2018-06-28 | Stop reason: SDUPTHER

## 2018-03-05 NOTE — PROGRESS NOTES
Answers for HPI/ROS submitted by the patient on 2/28/2018   activity change: Yes  unexpected weight change: Yes  neck pain: Yes  hearing loss: No  rhinorrhea: Yes  trouble swallowing: Yes  eye discharge: No  visual disturbance: No  chest tightness: Yes  wheezing: No  chest pain: No  palpitations: Yes  blood in stool: No  constipation: Yes  vomiting: Yes  diarrhea: Yes  polydipsia: No  polyuria: No  difficulty urinating: No  hematuria: No  menstrual problem: No  dysuria: No  joint swelling: Yes  arthralgias: Yes  headaches: Yes  weakness: Yes  confusion: No  dysphoric mood: Yes
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
## 2018-03-06 ENCOUNTER — TELEPHONE (OUTPATIENT)
Dept: FAMILY MEDICINE | Facility: CLINIC | Age: 43
End: 2018-03-06

## 2018-03-06 NOTE — TELEPHONE ENCOUNTER
Spoke w./ pt, pt has already picked up the rx. The pharmacy had received it . Pt does not need anything at this time . --lp

## 2018-03-06 NOTE — TELEPHONE ENCOUNTER
----- Message from Bushra Krausza sent at 3/5/2018 12:08 PM CST -----  Contact: self [phone]  She is checking on the second medication that you were to send to the pharmacy to help her sleep.  They did not receive it.  Please let her know there status.  Thank you!

## 2018-03-07 ENCOUNTER — TELEPHONE (OUTPATIENT)
Dept: FAMILY MEDICINE | Facility: CLINIC | Age: 43
End: 2018-03-07

## 2018-03-07 DIAGNOSIS — G89.21 CHRONIC PAIN DUE TO TRAUMA: Primary | ICD-10-CM

## 2018-03-07 NOTE — TELEPHONE ENCOUNTER
----- Message from Yazmin Pedraza sent at 3/7/2018  1:01 PM CST -----  Please call patient in regards to pain mgt referral, she is wanting it faxed to ,   fax # [phone]  Patients # [phone] (home)

## 2018-03-14 ENCOUNTER — TELEPHONE (OUTPATIENT)
Dept: FAMILY MEDICINE | Facility: CLINIC | Age: 43
End: 2018-03-14

## 2018-03-14 NOTE — TELEPHONE ENCOUNTER
[facility] sent notice the pt has scheduled today.  In Epic, routed referral to pain management.

## 2018-04-03 RX ORDER — DICYCLOMINE HYDROCHLORIDE 20 MG/1
TABLET ORAL
Qty: 60 TABLET | Refills: 3 | Status: SHIPPED | OUTPATIENT
Start: 2018-04-03 | End: 2019-09-18

## 2018-04-11 ENCOUNTER — OFFICE VISIT (OUTPATIENT)
Dept: FAMILY MEDICINE | Facility: CLINIC | Age: 43
End: 2018-04-11
Payer: MEDICARE

## 2018-04-11 VITALS
SYSTOLIC BLOOD PRESSURE: 130 MMHG | HEIGHT: 64 IN | WEIGHT: 225.06 LBS | TEMPERATURE: 99 F | BODY MASS INDEX: 38.42 KG/M2 | HEART RATE: 72 BPM | OXYGEN SATURATION: 96 % | DIASTOLIC BLOOD PRESSURE: 78 MMHG

## 2018-04-11 DIAGNOSIS — J30.1 CHRONIC ALLERGIC RHINITIS DUE TO POLLEN, UNSPECIFIED SEASONALITY: ICD-10-CM

## 2018-04-11 DIAGNOSIS — R73.09 OTHER ABNORMAL GLUCOSE: ICD-10-CM

## 2018-04-11 DIAGNOSIS — J01.01 ACUTE RECURRENT MAXILLARY SINUSITIS: ICD-10-CM

## 2018-04-11 DIAGNOSIS — R60.0 LOWER EXTREMITY EDEMA: ICD-10-CM

## 2018-04-11 DIAGNOSIS — G89.21 CHRONIC PAIN DUE TO TRAUMA: ICD-10-CM

## 2018-04-11 DIAGNOSIS — R35.0 URINARY FREQUENCY: Primary | ICD-10-CM

## 2018-04-11 LAB
BILIRUB SERPL-MCNC: ABNORMAL MG/DL
BLOOD URINE, POC: 50
COLOR, POC UA: ABNORMAL
GLUCOSE UR QL STRIP: NORMAL
KETONES UR QL STRIP: ABNORMAL
LEUKOCYTE ESTERASE URINE, POC: ABNORMAL
NITRITE, POC UA: ABNORMAL
PH, POC UA: 9
PROTEIN, POC: ABNORMAL
SPECIFIC GRAVITY, POC UA: 1
UROBILINOGEN, POC UA: NORMAL

## 2018-04-11 PROCEDURE — 99214 OFFICE O/P EST MOD 30 MIN: CPT | Mod: 25,S$GLB,, | Performed by: NURSE PRACTITIONER

## 2018-04-11 PROCEDURE — 81002 URINALYSIS NONAUTO W/O SCOPE: CPT | Mod: S$GLB,,, | Performed by: NURSE PRACTITIONER

## 2018-04-11 RX ORDER — HYDROCHLOROTHIAZIDE 25 MG/1
25 TABLET ORAL DAILY PRN
Qty: 30 TABLET | Refills: 3 | Status: ON HOLD | OUTPATIENT
Start: 2018-04-11 | End: 2018-06-02 | Stop reason: HOSPADM

## 2018-04-11 RX ORDER — AMOXICILLIN 875 MG/1
875 TABLET, FILM COATED ORAL EVERY 12 HOURS
Qty: 20 TABLET | Refills: 0 | Status: SHIPPED | OUTPATIENT
Start: 2018-04-11 | End: 2018-05-30

## 2018-04-11 NOTE — PROGRESS NOTES
Subjective:       Patient ID: Jamila eLe is a 42 y.o. female.    Chief Complaint: Follow-up (medication)    HPI she was not able to get a pain doctor to treat her. She is going to the methadone clinic and taking medication daily through them. She states that she does not know what to do about her chronic pain. She is basically house bound due to her mobility limitations. She Is having some sinus congestion, runny nose. Some puffiness around her eyes. No sore throat. Voice is hoarse. Eyes itching and runny. Rubbing her eyes a lot. Sinus pressure headache. She is taking benadryl and nasonex spray with only minimal relief. Feels like she can't take a deep breath. States increased fatigue. Mild cough. States she has to clear her throat a lot. Increased urination at night. States she is drinking plenty of fluids. No burning with urination. No urgency. She has not noticed any fever, but has had some chills off and on. Sneezing. See ROS.    HM: she is due for routine  Labs, mammogram, Tdap. Will send Tdap orders to the pharmacy for her to get. Will schedule  Mammogram and labs.    The following portion of the patients history was reviewed and updated as appropriate: allergies, current medications, past medical and surgical history. Past social history and problem list reviewed. Family PMH and Past social history reviewed. Tobacco, Illicit drug use reviewed.     Review of Systems   Constitutional: Positive for appetite change, chills and fatigue. Negative for fever.   HENT: Positive for congestion, postnasal drip, rhinorrhea, sinus pain, sinus pressure and sneezing. Negative for sore throat and trouble swallowing.    Eyes: Positive for discharge (watering a lot), redness and itching. Negative for visual disturbance.   Respiratory: Positive for cough, chest tightness and shortness of breath. Negative for wheezing.    Cardiovascular: Negative for chest pain, palpitations and leg swelling.   Gastrointestinal: Negative for  "abdominal pain, diarrhea, nausea and vomiting.   Musculoskeletal:        Chronic pain due to trauma. See HPI   Neurological: Positive for headaches (off and on).       Objective:     /78 (BP Location: Left arm, Patient Position: Sitting, BP Method: Medium (Manual))   Pulse 72   Temp 98.5 °F (36.9 °C) (Oral)   Ht 5' 4" (1.626 m)   Wt 102.1 kg (225 lb 1.4 oz)   SpO2 96%   BMI 38.64 kg/m²      Physical Exam  Constitutional:  well-developed and well-nourished. Oriented to Person, place and time.  Head: Normocephalic.   Ears: Canals without erythema or cerumen impactions. Mild air and /fluid levels. No bulging bilaterally.  Nose: Rhinorrhea and sinus tenderness present with some puffiness around eyes.   Mouth/Throat: Uvula is midline and mucous membranes are normal. Posterior oropharyngeal erythema present. PND to posterior pharynx.   Eyes: Conjunctivae erythema and some swelling. watering. Pupils are equal, round, and reactive to light.   Neck: Normal range of motion. Neck supple. mild inflammation and tenderness to anterior cervical lymph nodes  Cardiovascular: Normal rate and regular rhythm.  No murmurs or gallops.   Pulmonary/Chest: Effort normal and breath sounds normal.  no wheezing or rales.   Musculoskeletal: she uses a  Cane for support. Gait is very unstable and hard for her due to the pelvic injury she has.   Assessment:       1. Urinary frequency    2. Acute recurrent maxillary sinusitis    3. Lower extremity edema    4. Chronic allergic rhinitis due to pollen, unspecified seasonality    5. Other abnormal glucose     6. Chronic pain due to trauma        Plan:         Jamila was seen today for follow-up.    Diagnoses and all orders for this visit:    Urinary frequency: no indication of infection.   -     POCT urine dipstick without microscope  -     Hemoglobin A1c; Future    Results for orders placed or performed in visit on 04/11/18   POCT urine dipstick without microscope   Result Value Ref Range "    Color, UA lt yellow     Spec Grav UA 1.000     pH, UA 9     WBC, UA trace     Nitrite, UA neg     Protein neg     Glucose, UA normal     Ketones, UA neg     Urobilinogen, UA normal     Bilirubin neg     Blood, UA 50        Acute recurrent maxillary sinusitis: due to duration and presenting exam will cover with antibiotics. Take as directed.     Lower extremity edema: HCTZ prn for lower extremity edema. Hydrate well.     Chronic allergic rhinitis due to pollen, unspecified seasonality:  Take claritin and flonase daily.     Other abnormal glucose   -     Hemoglobin A1c; Future    Chronic pain due to trauma: she will have to continue to try and find a pain management doctor. She is aware that our clinic does not treat chronic pain.     Other orders  -     hydroCHLOROthiazide (HYDRODIURIL) 25 MG tablet; Take 1 tablet (25 mg total) by mouth daily as needed.  -     amoxicillin (AMOXIL) 875 MG tablet; Take 1 tablet (875 mg total) by mouth every 12 (twelve) hours.    Continue current medication  Take medications only as prescribed  Healthy diet  Adequate rest  Adequate hydration  Avoid allergens  Avoid excessive caffeine

## 2018-04-12 ENCOUNTER — TELEPHONE (OUTPATIENT)
Dept: FAMILY MEDICINE | Facility: CLINIC | Age: 43
End: 2018-04-12

## 2018-04-12 NOTE — TELEPHONE ENCOUNTER
----- Message from Penelope Velasquez sent at 4/12/2018  2:10 PM CDT -----  Type:  Patient Returning Call    Who Called:  Patient  Who Left Message for Patient:  Alyx  Does the patient know what this is regarding?:  No  Best Call Back Number:  [phone]  Additional Information:  Please call back with details.

## 2018-04-12 NOTE — TELEPHONE ENCOUNTER
I let her leave her appointment yesterday without scheduling labs or her mammogram.  Orders are placed. Will you please call her to schedule these things. Thanks.

## 2018-04-12 NOTE — TELEPHONE ENCOUNTER
Left message to return call,    I let her leave her appointment yesterday without scheduling labs or her mammogram.  Orders are placed. Will you please call her to schedule these things. Thanks

## 2018-04-12 NOTE — TELEPHONE ENCOUNTER
Spoke to pt and she said she had to put her car in the shop and will have to call back Monday to give day she can have labs and mammogram done.

## 2018-04-18 DIAGNOSIS — M62.838 MUSCLE SPASMS OF BOTH LOWER EXTREMITIES: ICD-10-CM

## 2018-04-18 DIAGNOSIS — F51.01 PRIMARY INSOMNIA: ICD-10-CM

## 2018-04-18 DIAGNOSIS — F41.9 ANXIETY: ICD-10-CM

## 2018-04-18 RX ORDER — DIAZEPAM 10 MG/1
10 TABLET ORAL 2 TIMES DAILY
Qty: 60 TABLET | Refills: 2 | Status: CANCELLED | OUTPATIENT
Start: 2018-04-18

## 2018-04-24 RX ORDER — DIAZEPAM 10 MG/1
10 TABLET ORAL 2 TIMES DAILY
Qty: 60 TABLET | Refills: 2 | Status: SHIPPED | OUTPATIENT
Start: 2018-04-24 | End: 2018-07-13 | Stop reason: SDUPTHER

## 2018-05-30 PROBLEM — R07.9 CHEST PAIN: Status: ACTIVE | Noted: 2018-05-30

## 2018-05-30 PROBLEM — R60.0 LOWER EXTREMITY EDEMA: Status: ACTIVE | Noted: 2018-05-30

## 2018-05-31 PROBLEM — E66.9 OBESITY: Status: ACTIVE | Noted: 2018-05-31

## 2018-06-01 PROBLEM — E27.49 CORTISOL DEFICIENCY: Status: ACTIVE | Noted: 2018-06-01

## 2018-06-26 RX ORDER — LIOTHYRONINE SODIUM 25 UG/1
25 TABLET ORAL DAILY
Qty: 30 TABLET | Refills: 11
Start: 2018-06-26 | End: 2019-02-27 | Stop reason: SDUPTHER

## 2018-06-26 RX ORDER — HYDROCHLOROTHIAZIDE 25 MG/1
TABLET ORAL
Refills: 3 | COMMUNITY
Start: 2018-06-18 | End: 2018-07-09

## 2018-06-26 RX ORDER — TEMAZEPAM 30 MG/1
CAPSULE ORAL
Refills: 3 | COMMUNITY
Start: 2018-06-02 | End: 2018-10-29 | Stop reason: SDUPTHER

## 2018-06-29 RX ORDER — TEMAZEPAM 30 MG/1
30 CAPSULE ORAL NIGHTLY PRN
Qty: 30 CAPSULE | Refills: 3 | Status: SHIPPED | OUTPATIENT
Start: 2018-06-29 | End: 2018-07-29

## 2018-06-29 RX ORDER — FLUOXETINE HYDROCHLORIDE 20 MG/1
20 CAPSULE ORAL DAILY
Qty: 30 CAPSULE | Refills: 3 | Status: SHIPPED | OUTPATIENT
Start: 2018-06-29 | End: 2019-01-04 | Stop reason: SDUPTHER

## 2018-07-10 PROBLEM — R10.9 ABDOMINAL PAIN: Status: ACTIVE | Noted: 2018-07-10

## 2018-07-10 PROBLEM — L03.116 CELLULITIS OF LEFT LOWER EXTREMITY: Status: ACTIVE | Noted: 2018-07-10

## 2018-07-10 PROBLEM — L53.9 DEPENDENT RUBOR: Status: ACTIVE | Noted: 2018-07-10

## 2018-07-10 PROBLEM — L03.116 CELLULITIS OF LEFT LEG: Status: ACTIVE | Noted: 2018-07-10

## 2018-07-13 RX ORDER — DIAZEPAM 10 MG/1
10 TABLET ORAL 2 TIMES DAILY
Qty: 60 TABLET | Refills: 0 | Status: SHIPPED | OUTPATIENT
Start: 2018-07-13 | End: 2018-08-10 | Stop reason: SDUPTHER

## 2018-07-24 PROBLEM — E66.01 MORBID OBESITY: Status: ACTIVE | Noted: 2018-05-31

## 2018-08-10 RX ORDER — DIAZEPAM 10 MG/1
10 TABLET ORAL 2 TIMES DAILY
Qty: 60 TABLET | Refills: 0 | Status: SHIPPED | OUTPATIENT
Start: 2018-08-10 | End: 2018-09-11 | Stop reason: SDUPTHER

## 2018-09-11 RX ORDER — DIAZEPAM 10 MG/1
10 TABLET ORAL 2 TIMES DAILY
Qty: 60 TABLET | Refills: 2 | Status: SHIPPED | OUTPATIENT
Start: 2018-09-11 | End: 2018-12-07 | Stop reason: SDUPTHER

## 2018-10-24 RX ORDER — TEMAZEPAM 30 MG/1
30 CAPSULE ORAL NIGHTLY PRN
Qty: 30 CAPSULE | Refills: 3 | OUTPATIENT
Start: 2018-10-24 | End: 2018-11-23

## 2018-10-24 NOTE — TELEPHONE ENCOUNTER
She is supposed to be seen every 3 months to keep getting her controlled medications from me. She has not been seen in over 6 months. She needs appointment before more meds can be given.

## 2018-10-29 ENCOUNTER — OFFICE VISIT (OUTPATIENT)
Dept: FAMILY MEDICINE | Facility: CLINIC | Age: 43
End: 2018-10-29
Payer: MEDICARE

## 2018-10-29 VITALS
HEART RATE: 76 BPM | TEMPERATURE: 99 F | HEIGHT: 64 IN | DIASTOLIC BLOOD PRESSURE: 70 MMHG | SYSTOLIC BLOOD PRESSURE: 138 MMHG | RESPIRATION RATE: 18 BRPM | WEIGHT: 224 LBS | BODY MASS INDEX: 38.24 KG/M2

## 2018-10-29 DIAGNOSIS — E03.4 HYPOTHYROIDISM DUE TO ACQUIRED ATROPHY OF THYROID: ICD-10-CM

## 2018-10-29 DIAGNOSIS — Z12.39 BREAST CANCER SCREENING: ICD-10-CM

## 2018-10-29 DIAGNOSIS — Z23 IMMUNIZATION DUE: ICD-10-CM

## 2018-10-29 DIAGNOSIS — G89.21 CHRONIC PAIN DUE TO INJURY: ICD-10-CM

## 2018-10-29 DIAGNOSIS — H92.02 LEFT EAR PAIN: ICD-10-CM

## 2018-10-29 DIAGNOSIS — R10.2 VAGINAL PAIN: ICD-10-CM

## 2018-10-29 DIAGNOSIS — S38.1XXA CRUSHING INJURY OF PELVIC REGION: ICD-10-CM

## 2018-10-29 DIAGNOSIS — F51.01 PRIMARY INSOMNIA: ICD-10-CM

## 2018-10-29 DIAGNOSIS — J30.9 ALLERGIC RHINITIS, UNSPECIFIED SEASONALITY, UNSPECIFIED TRIGGER: Primary | ICD-10-CM

## 2018-10-29 PROCEDURE — 99214 OFFICE O/P EST MOD 30 MIN: CPT | Mod: S$GLB,,, | Performed by: NURSE PRACTITIONER

## 2018-10-29 RX ORDER — TEMAZEPAM 30 MG/1
30 CAPSULE ORAL NIGHTLY PRN
Qty: 30 CAPSULE | Refills: 3 | Status: CANCELLED | OUTPATIENT
Start: 2018-10-29

## 2018-10-29 RX ORDER — LIDOCAINE 50 MG/G
PATCH TOPICAL
Refills: 0 | COMMUNITY
Start: 2018-07-25 | End: 2020-01-11

## 2018-10-29 RX ORDER — TEMAZEPAM 30 MG/1
30 CAPSULE ORAL NIGHTLY PRN
Qty: 30 CAPSULE | Refills: 3 | Status: SHIPPED | OUTPATIENT
Start: 2018-10-29 | End: 2019-02-27 | Stop reason: SDUPTHER
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
## 2018-10-30 PROBLEM — L03.116 CELLULITIS OF LEFT LOWER EXTREMITY: Status: RESOLVED | Noted: 2018-07-10 | Resolved: 2018-10-30

## 2018-10-30 PROBLEM — L03.116 CELLULITIS OF LEFT LEG: Status: RESOLVED | Noted: 2018-07-10 | Resolved: 2018-10-30

## 2018-10-30 PROBLEM — R19.7 DIARRHEA: Status: RESOLVED | Noted: 2017-07-25 | Resolved: 2018-10-30

## 2018-11-07 ENCOUNTER — PATIENT MESSAGE (OUTPATIENT)
Dept: FAMILY MEDICINE | Facility: CLINIC | Age: 43
End: 2018-11-07

## 2018-11-07 RX ORDER — AMOXICILLIN 500 MG/1
500 TABLET, FILM COATED ORAL EVERY 12 HOURS
Qty: 20 TABLET | Refills: 0 | Status: SHIPPED | OUTPATIENT
Start: 2018-11-07 | End: 2018-11-17

## 2018-12-07 RX ORDER — DIAZEPAM 10 MG/1
10 TABLET ORAL 2 TIMES DAILY
Qty: 60 TABLET | Refills: 2 | Status: SHIPPED | OUTPATIENT
Start: 2018-12-07 | End: 2019-02-26 | Stop reason: SDUPTHER

## 2018-12-07 RX ORDER — DIAZEPAM 10 MG/1
TABLET ORAL
Qty: 60 TABLET | Refills: 2 | OUTPATIENT
Start: 2018-12-07

## 2019-01-04 RX ORDER — FLUOXETINE HYDROCHLORIDE 20 MG/1
20 CAPSULE ORAL DAILY
Qty: 30 CAPSULE | Refills: 3 | Status: SHIPPED | OUTPATIENT
Start: 2019-01-04 | End: 2019-07-03 | Stop reason: SDUPTHER

## 2019-01-15 ENCOUNTER — PATIENT OUTREACH (OUTPATIENT)
Dept: ADMINISTRATIVE | Facility: HOSPITAL | Age: 44
End: 2019-01-15

## 2019-01-15 ENCOUNTER — PATIENT MESSAGE (OUTPATIENT)
Dept: ADMINISTRATIVE | Facility: HOSPITAL | Age: 44
End: 2019-01-15

## 2019-01-15 NOTE — LETTER
January 22, 2019    Jamila Lee  [address] [address] [id number]             [facility]  [address]  [place] [address]  Phone: [phone] Dear Ms. Lee:    Dear Jamila Lee,     [facility] is committed to your overall health.  To help you get the most out of each of your visits, we will review your information to make sure you are up to date on all of your recommended tests and or procedures.       Dr. Germaine Wesley NP has found that you may be due for:     Pneumonia Vaccine   Tetanus Vaccine   Mammogram     If you have had any of the above done at another facility, please bring the records or information with you so that your record at [facility] will be complete and up to date.     If you have not had any of these tests or procedures done recently and would like to complete this testing before your appointment on 1-29-19 at 10:00am with Germaine Wesley NP please call [phone] or send a message through your [facility] portal to your provider's office.     If you are currently taking medication, please bring it with you to your appointment for review.     Please feel free to call the number listed below if you have any questions.     Thanks,     Pati Abreu MA   [phone]     Additional Information   If you have questions, you can email [email or web address] or call [phone]  to talk to our [facility] staff. Remember, [facility] is NOT to be used for urgent needs. For medical emergencies, dial 911.         If you have any questions or concerns, please don't hesitate to call.    Sincerely,        Pati Abreu MA

## 2019-01-23 ENCOUNTER — TELEPHONE (OUTPATIENT)
Dept: FAMILY MEDICINE | Facility: CLINIC | Age: 44
End: 2019-01-23

## 2019-01-23 NOTE — TELEPHONE ENCOUNTER
----- Message from Enriqueta Pettit sent at 1/23/2019  9:39 AM CST -----  Contact: Patient  Type: Needs Medical Advice    Who Called:  Patient    Best Call Back Number: [phone]  Additional Information: Patient had a splint/cast put on her right hand from fingers to elbow for swollen tendons on 1/13/19.  Patient was told to see an orthopedist in 7 days, but she could not get an appointment until the 1/30/19.  Patient would like to know if she could see you to look at the splint/cast to either remove it or replace it.  Patient would like to know if it would better if she went back to the hospital or if you could help her with this.    Please call to advise  Thank you

## 2019-02-27 RX ORDER — TEMAZEPAM 30 MG/1
30 CAPSULE ORAL NIGHTLY PRN
Qty: 30 CAPSULE | Refills: 2 | Status: SHIPPED | OUTPATIENT
Start: 2019-02-27 | End: 2019-05-28 | Stop reason: SDUPTHER

## 2019-02-27 RX ORDER — DIAZEPAM 10 MG/1
10 TABLET ORAL 2 TIMES DAILY
Qty: 60 TABLET | Refills: 2 | Status: SHIPPED | OUTPATIENT
Start: 2019-02-27 | End: 2019-06-18 | Stop reason: SDUPTHER

## 2019-02-27 RX ORDER — LIOTHYRONINE SODIUM 25 UG/1
25 TABLET ORAL DAILY
Qty: 30 TABLET | Refills: 2
Start: 2019-02-27 | End: 2019-09-05 | Stop reason: ALTCHOICE

## 2019-02-27 RX ORDER — DIAZEPAM 10 MG/1
TABLET ORAL
Qty: 60 TABLET | Refills: 0 | Status: SHIPPED | OUTPATIENT
Start: 2019-02-27 | End: 2019-03-29 | Stop reason: SDUPTHER

## 2019-03-29 ENCOUNTER — OFFICE VISIT (OUTPATIENT)
Dept: FAMILY MEDICINE | Facility: CLINIC | Age: 44
End: 2019-03-29
Payer: MEDICARE

## 2019-03-29 DIAGNOSIS — R51.9 ACUTE INTRACTABLE HEADACHE, UNSPECIFIED HEADACHE TYPE: ICD-10-CM

## 2019-03-29 DIAGNOSIS — J18.9 PNEUMONIA OF RIGHT LOWER LOBE DUE TO INFECTIOUS ORGANISM: ICD-10-CM

## 2019-03-29 DIAGNOSIS — J45.51 SEVERE PERSISTENT REACTIVE AIRWAY DISEASE WITH ACUTE EXACERBATION: Primary | ICD-10-CM

## 2019-03-29 PROCEDURE — 99214 OFFICE O/P EST MOD 30 MIN: CPT | Mod: 25,S$GLB,, | Performed by: NURSE PRACTITIONER

## 2019-03-29 PROCEDURE — 96372 PR INJECTION,THERAP/PROPH/DIAG2ST, IM OR SUBCUT: ICD-10-PCS | Mod: S$GLB,,, | Performed by: NURSE PRACTITIONER

## 2019-03-29 PROCEDURE — 99214 PR OFFICE/OUTPT VISIT, EST, LEVL IV, 30-39 MIN: ICD-10-PCS | Mod: 25,S$GLB,, | Performed by: NURSE PRACTITIONER

## 2019-03-29 PROCEDURE — 96372 THER/PROPH/DIAG INJ SC/IM: CPT | Mod: S$GLB,,, | Performed by: NURSE PRACTITIONER

## 2019-03-29 PROCEDURE — 94640 PR INHAL RX, AIRWAY OBST/DX SPUTUM INDUCT: ICD-10-PCS | Mod: 59,S$GLB,, | Performed by: NURSE PRACTITIONER

## 2019-03-29 PROCEDURE — 94640 AIRWAY INHALATION TREATMENT: CPT | Mod: 59,S$GLB,, | Performed by: NURSE PRACTITIONER

## 2019-03-29 RX ORDER — ALBUTEROL SULFATE 0.83 MG/ML
2.5 SOLUTION RESPIRATORY (INHALATION) EVERY 6 HOURS PRN
Qty: 2 BOX | Refills: 2 | Status: SHIPPED | OUTPATIENT
Start: 2019-03-29 | End: 2020-03-28

## 2019-03-29 RX ORDER — DEXAMETHASONE SODIUM PHOSPHATE 4 MG/ML
8 INJECTION, SOLUTION INTRA-ARTICULAR; INTRALESIONAL; INTRAMUSCULAR; INTRAVENOUS; SOFT TISSUE ONCE
Status: COMPLETED | OUTPATIENT
Start: 2019-03-29 | End: 2019-03-29

## 2019-03-29 RX ORDER — PREDNISONE 20 MG/1
TABLET ORAL
Qty: 10 TABLET | Refills: 0 | Status: SHIPPED | OUTPATIENT
Start: 2019-03-29 | End: 2019-06-22

## 2019-03-29 RX ORDER — LEVOTHYROXINE SODIUM 150 UG/1
1 TABLET ORAL DAILY
Refills: 6 | COMMUNITY
Start: 2019-03-25 | End: 2019-07-19

## 2019-03-29 RX ORDER — AMOXICILLIN 875 MG/1
875 TABLET, FILM COATED ORAL EVERY 12 HOURS
Qty: 20 TABLET | Refills: 0 | Status: SHIPPED | OUTPATIENT
Start: 2019-03-29 | End: 2019-06-22

## 2019-03-29 RX ORDER — PROMETHAZINE HYDROCHLORIDE AND DEXTROMETHORPHAN HYDROBROMIDE 6.25; 15 MG/5ML; MG/5ML
5 SYRUP ORAL EVERY 4 HOURS PRN
Qty: 240 ML | Refills: 1 | Status: SHIPPED | OUTPATIENT
Start: 2019-03-29 | End: 2019-04-08

## 2019-03-29 RX ORDER — KETOROLAC TROMETHAMINE 30 MG/ML
60 INJECTION, SOLUTION INTRAMUSCULAR; INTRAVENOUS
Status: COMPLETED | OUTPATIENT
Start: 2019-03-29 | End: 2019-03-29

## 2019-03-29 RX ORDER — LEVALBUTEROL INHALATION SOLUTION 1.25 MG/3ML
1.25 SOLUTION RESPIRATORY (INHALATION)
Status: COMPLETED | OUTPATIENT
Start: 2019-03-29 | End: 2019-03-29

## 2019-03-29 RX ADMIN — DEXAMETHASONE SODIUM PHOSPHATE 8 MG: 4 INJECTION, SOLUTION INTRA-ARTICULAR; INTRALESIONAL; INTRAMUSCULAR; INTRAVENOUS; SOFT TISSUE at 11:03

## 2019-03-29 RX ADMIN — KETOROLAC TROMETHAMINE 60 MG: 30 INJECTION, SOLUTION INTRAMUSCULAR; INTRAVENOUS at 11:03

## 2019-03-29 RX ADMIN — LEVALBUTEROL INHALATION SOLUTION 1.25 MG: 1.25 SOLUTION RESPIRATORY (INHALATION) at 11:03

## 2019-03-29 NOTE — PROGRESS NOTES
"Subjective:       Patient ID: Jamila Lee is a 43 y.o. female.    Chief Complaint: Cough (Started yesterday) and Chest Congestion    HPI has had URI symptoms for over a week but got worse, onset yesterday with coughing. Bad headache. Was seen in ER last week for migraine. Coughing hurts her head. PND. Wheezing. Chest pressure. Fatigue. Low grade fever. Achy all over. Not sleeping due to SOB and wheezing. See ROS    The following portion of the patients history was reviewed and updated as appropriate: allergies, current medications, past medical and surgical history. Past social history and problem list reviewed. Family PMH and Past social history reviewed. Tobacco, Illicit drug use reviewed.     Review of Systems   Constitutional: Positive for chills, fatigue and fever.   HENT: Positive for congestion, postnasal drip, rhinorrhea and sore throat.    Eyes: Negative for visual disturbance.   Respiratory: Positive for cough, chest tightness, shortness of breath and wheezing.    Cardiovascular: Negative for chest pain, palpitations and leg swelling.   Gastrointestinal: Positive for nausea. Negative for abdominal pain, diarrhea and vomiting.   Genitourinary: Negative for difficulty urinating.   Musculoskeletal: Positive for arthralgias, back pain (chronic) and gait problem.   Neurological: Positive for headaches.   Hematological: Negative for adenopathy. Does not bruise/bleed easily.       Objective:     /72   Pulse 99   Temp 99.4 °F (37.4 °C) (Oral)   Resp (!) 24   Ht 5' 4" (1.626 m)   Wt 99.4 kg (219 lb 3.2 oz)   SpO2 (!) 94%   BMI 37.63 kg/m²      Physical Exam  General Appearance: alert, oriented. No acute distress, well groomed  Head: atraumatic  Eyes: PERRL. Conjunctivae normal. No drainage or redness.  Nose: patent. Normal mucosa.   Throat: patent, no erythema or exudate. Tonsils normal. No JVD, No bruit  Mouth: no lesions. Moist mucous membranes.  Neck: supple. Normal ROM. No masses or tenderness. "   Lymph: no enlarged or tender anterior cervical or supraclavicular nodes.  Lungs: no distress.  End exp wheezing. Rales RLL.   Heart:: S1S2. No murmurs or gallops.   Abdomen: normal bowel sounds. No tenderness. Soft. No rebound. No hepatosplenomegaly.   Skin: no rashes or lesions. Skin warm to touch.   Perfusion: good capillary refill.   Musculoskeletal: she has deformity to her pelvic and hips areas. Walks with a limp. Is unable to stand up straight.   Psychiatric: normal mood, behavior. Cooperative. Does not present as a threat to self or others. Thought process normal.   Assessment:       1. Severe persistent reactive airway disease with acute exacerbation    2. Acute intractable headache, unspecified headache type    3. Pneumonia of right lower lobe due to infectious organism        Plan:         Jamila was seen today for cough and chest congestion.    Diagnoses and all orders for this visit:    Severe persistent reactive airway disease with acute exacerbation: gave respiratory treatment. It helped open her up some.   -     levalbuterol nebulizer solution 1.25 mg  -     dexamethasone injection 8 mg:  Risks and benefits discussed. Potential side effects such as anxiety, palpitations and flushing discussed. May increase blood glucose levels over the next 48 hours. Potential for skin atrophy at injection site. Tolerated injection well.    Acute intractable headache, unspecified headache type: will give toradol injection.   -     ketorolac injection 60 mg    Pneumonia of right lower lobe due to infectious organism: treat with antibiotics. Take as directed. Nebulizer and prednisone taper.    Other orders  -     albuterol (PROVENTIL) 2.5 mg /3 mL (0.083 %) nebulizer solution; Take 3 mLs (2.5 mg total) by nebulization every 6 (six) hours as needed for Wheezing. Rescue  -     amoxicillin (AMOXIL) 875 MG tablet; Take 1 tablet (875 mg total) by mouth every 12 (twelve) hours.  -     promethazine-dextromethorphan  (PROMETHAZINE-DM) 6.25-15 mg/5 mL Syrp; Take 5 mLs by mouth every 4 (four) hours as needed.  -     predniSONE (DELTASONE) 20 MG tablet; Take 2 tablets in am for 3 days, then one for 3 days then 1/2 for 2 days    Continue current medication  Take medications only as prescribed  Healthy diet  Adequate rest  Adequate hydration  Avoid allergens  Avoid excessive caffeine

## 2019-04-02 ENCOUNTER — HOSPITAL ENCOUNTER (OUTPATIENT)
Dept: RADIOLOGY | Facility: HOSPITAL | Age: 44
Discharge: HOME | End: 2019-04-02
Attending: NURSE PRACTITIONER
Payer: MEDICARE

## 2019-04-02 DIAGNOSIS — Z12.39 BREAST CANCER SCREENING: ICD-10-CM

## 2019-04-02 PROCEDURE — 77067 MAMMO DIGITAL SCREENING BILAT WITH TOMOSYNTHESIS_CAD: ICD-10-PCS | Mod: 26,,, | Performed by: RADIOLOGY

## 2019-04-02 PROCEDURE — 77063 BREAST TOMOSYNTHESIS BI: CPT | Mod: 26,,, | Performed by: RADIOLOGY

## 2019-04-02 PROCEDURE — 77067 SCR MAMMO BI INCL CAD: CPT | Mod: TC,PO

## 2019-04-02 PROCEDURE — 77063 MAMMO DIGITAL SCREENING BILAT WITH TOMOSYNTHESIS_CAD: ICD-10-PCS | Mod: 26,,, | Performed by: RADIOLOGY

## 2019-04-02 PROCEDURE — 77067 SCR MAMMO BI INCL CAD: CPT | Mod: 26,,, | Performed by: RADIOLOGY

## 2019-04-03 ENCOUNTER — PATIENT MESSAGE (OUTPATIENT)
Dept: FAMILY MEDICINE | Facility: CLINIC | Age: 44
End: 2019-04-03

## 2019-04-03 VITALS
RESPIRATION RATE: 24 BRPM | OXYGEN SATURATION: 94 % | HEIGHT: 64 IN | DIASTOLIC BLOOD PRESSURE: 72 MMHG | WEIGHT: 219.19 LBS | TEMPERATURE: 99 F | HEART RATE: 99 BPM | BODY MASS INDEX: 37.42 KG/M2 | SYSTOLIC BLOOD PRESSURE: 132 MMHG

## 2019-04-29 ENCOUNTER — TELEPHONE (OUTPATIENT)
Dept: FAMILY MEDICINE | Facility: CLINIC | Age: 44
End: 2019-04-29

## 2019-04-29 NOTE — TELEPHONE ENCOUNTER
Called pt to reschedule her appt with Germaine Wesley NP today due to Germaine being out and pt said she will call back to reschedule.

## 2019-05-28 RX ORDER — TEMAZEPAM 30 MG/1
30 CAPSULE ORAL NIGHTLY PRN
Qty: 30 CAPSULE | Refills: 2 | Status: SHIPPED | OUTPATIENT
Start: 2019-05-28 | End: 2019-08-26 | Stop reason: SDUPTHER

## 2019-06-18 RX ORDER — DIAZEPAM 10 MG/1
TABLET ORAL
Qty: 60 TABLET | Refills: 2 | Status: SHIPPED | OUTPATIENT
Start: 2019-06-18 | End: 2019-08-26 | Stop reason: SDUPTHER

## 2019-06-18 RX ORDER — DIAZEPAM 10 MG/1
10 TABLET ORAL 2 TIMES DAILY
Qty: 60 TABLET | Refills: 2
Start: 2019-06-18 | End: 2019-06-22

## 2019-06-22 ENCOUNTER — OFFICE VISIT (OUTPATIENT)
Dept: FAMILY MEDICINE | Facility: CLINIC | Age: 44
End: 2019-06-22
Payer: MEDICARE

## 2019-06-22 VITALS
SYSTOLIC BLOOD PRESSURE: 122 MMHG | HEART RATE: 60 BPM | DIASTOLIC BLOOD PRESSURE: 76 MMHG | OXYGEN SATURATION: 97 % | TEMPERATURE: 99 F

## 2019-06-22 DIAGNOSIS — G43.719 INTRACTABLE CHRONIC MIGRAINE WITHOUT AURA AND WITHOUT STATUS MIGRAINOSUS: Primary | ICD-10-CM

## 2019-06-22 DIAGNOSIS — Z79.899 OTHER LONG TERM (CURRENT) DRUG THERAPY: ICD-10-CM

## 2019-06-22 DIAGNOSIS — E78.2 MIXED HYPERLIPIDEMIA: ICD-10-CM

## 2019-06-22 DIAGNOSIS — J01.01 ACUTE RECURRENT MAXILLARY SINUSITIS: ICD-10-CM

## 2019-06-22 DIAGNOSIS — Z00.00 LABORATORY EXAM ORDERED AS PART OF ROUTINE GENERAL MEDICAL EXAMINATION: ICD-10-CM

## 2019-06-22 DIAGNOSIS — E03.4 HYPOTHYROIDISM DUE TO ACQUIRED ATROPHY OF THYROID: ICD-10-CM

## 2019-06-22 DIAGNOSIS — R53.83 FATIGUE, UNSPECIFIED TYPE: ICD-10-CM

## 2019-06-22 DIAGNOSIS — J45.41 MODERATE PERSISTENT REACTIVE AIRWAY DISEASE WITH ACUTE EXACERBATION: ICD-10-CM

## 2019-06-22 PROCEDURE — 99214 PR OFFICE/OUTPT VISIT, EST, LEVL IV, 30-39 MIN: ICD-10-PCS | Mod: S$GLB,,, | Performed by: NURSE PRACTITIONER

## 2019-06-22 PROCEDURE — 99214 OFFICE O/P EST MOD 30 MIN: CPT | Mod: S$GLB,,, | Performed by: NURSE PRACTITIONER

## 2019-06-22 RX ORDER — AMITRIPTYLINE HYDROCHLORIDE 25 MG/1
25 TABLET, FILM COATED ORAL NIGHTLY
Qty: 30 TABLET | Refills: 6 | Status: SHIPPED | OUTPATIENT
Start: 2019-06-22 | End: 2019-07-12

## 2019-06-22 RX ORDER — PREDNISONE 20 MG/1
TABLET ORAL
Qty: 19 TABLET | Refills: 0 | Status: SHIPPED | OUTPATIENT
Start: 2019-06-22 | End: 2019-07-12 | Stop reason: ALTCHOICE

## 2019-06-22 RX ORDER — GABAPENTIN 300 MG/1
600 CAPSULE ORAL 3 TIMES DAILY
Refills: 2 | Status: ON HOLD | COMMUNITY
Start: 2019-05-17 | End: 2019-07-24 | Stop reason: SDUPTHER

## 2019-06-22 RX ORDER — PROMETHAZINE HYDROCHLORIDE AND DEXTROMETHORPHAN HYDROBROMIDE 6.25; 15 MG/5ML; MG/5ML
5 SYRUP ORAL
Qty: 240 ML | Refills: 1 | Status: SHIPPED | OUTPATIENT
Start: 2019-06-22 | End: 2019-07-12

## 2019-06-22 RX ORDER — DICLOFENAC EPOLAMINE 0.01 G/1
SYSTEM TOPICAL
Refills: 3 | Status: ON HOLD | COMMUNITY
Start: 2019-05-28 | End: 2019-07-24 | Stop reason: SDUPTHER

## 2019-06-22 RX ORDER — AMOXICILLIN AND CLAVULANATE POTASSIUM 500; 125 MG/1; MG/1
1 TABLET, FILM COATED ORAL 2 TIMES DAILY
Qty: 20 TABLET | Refills: 0 | Status: SHIPPED | OUTPATIENT
Start: 2019-06-22 | End: 2019-07-12

## 2019-06-22 RX ORDER — PROMETHAZINE HYDROCHLORIDE AND DEXTROMETHORPHAN HYDROBROMIDE 6.25; 15 MG/5ML; MG/5ML
SYRUP ORAL
Refills: 1 | COMMUNITY
Start: 2019-04-08 | End: 2019-06-22 | Stop reason: SDUPTHER

## 2019-06-22 RX ORDER — LEVOFLOXACIN 500 MG/1
500 TABLET, FILM COATED ORAL DAILY
Qty: 10 TABLET | Refills: 0 | Status: SHIPPED | OUTPATIENT
Start: 2019-06-22 | End: 2019-06-22

## 2019-06-22 NOTE — PROGRESS NOTES
Subjective:       Patient ID: Jamila Lee is a 43 y.o. female.    Chief Complaint: Mammogram    HPI She was treated In March for URI. States still with cough that is productive. It had become more dry, but over the past few weeks has become more productive. Sinus pressure, pain over the face. Ears with pressure. Having coughing spasms. Wheezing. States her migraines are getting worse. Light sensitive. Cannot eat when they are bad. No vision changes. Having to take migraine medication more often.     She states she feels like her thyroid is off. TSH one year ago was 18.2. States usually followed by Dr. Johnson. States she was taking 150mcg of synthroid and then two days taking 137 mcg. Also on Cytomel. States she has been out of her 137 mcg so has only been taking 150mcg. She needs to have thyroid level checked. States she has been more fatigued. She has not had labs done in some time so will schedule to check labs. See ROS.    The following portion of the patients history was reviewed and updated as appropriate: allergies, current medications, past medical and surgical history. Past social history and problem list reviewed. Family PMH and Past social history reviewed. Tobacco, Illicit drug use reviewed.     Review of Systems   Constitutional: Positive for fatigue. Negative for fever.   HENT: Positive for congestion, postnasal drip, rhinorrhea and sinus pain. Negative for sore throat and voice change.    Eyes: Negative for visual disturbance.   Respiratory: Positive for cough, shortness of breath and wheezing.    Cardiovascular: Negative for chest pain and palpitations.   Gastrointestinal: Negative for abdominal pain, diarrhea, nausea and vomiting.   Genitourinary: Negative for difficulty urinating and dysuria.   Musculoskeletal: Positive for arthralgias, back pain and gait problem.        Crush injury to pelvic area. Chronic pain. She uses wheelchair.    Skin: Negative for rash and wound.   Neurological: Positive  for headaches. Negative for dizziness and weakness.   Psychiatric/Behavioral: Positive for dysphoric mood. Negative for decreased concentration and sleep disturbance. The patient is nervous/anxious.        Objective:     /76   Pulse 60   Temp 98.5 °F (36.9 °C) (Oral)   SpO2 97%      Physical Exam   Constitutional: She is oriented to person, place, and time. She appears well-developed and well-nourished. No distress.   HENT:   Head: Normocephalic.   Eyes: Pupils are equal, round, and reactive to light. Conjunctivae and EOM are normal.   Neck: Trachea normal and normal range of motion. Neck supple. No JVD present. No tracheal tenderness present. Carotid bruit is not present. No tracheal deviation and no edema present. No thyromegaly present.   Cardiovascular: Normal rate, regular rhythm and normal heart sounds. Exam reveals no gallop.   No murmur heard.  Pulmonary/Chest: No stridor. No respiratory distress. She has wheezes in the left lower field. She has no rhonchi. She has rales in the left lower field.   Abdominal: Soft. Normal appearance and bowel sounds are normal. She exhibits no mass. There is no splenomegaly. There is no tenderness. There is no rebound.   Musculoskeletal: Normal range of motion.   She is in wheelchair. She is able to ambulate but very painful for her. Has to use a cane for support.    Neurological: She is alert and oriented to person, place, and time. She has normal strength.   Skin: Skin is warm and dry. No lesion and no rash noted.   Psychiatric: She has a normal mood and affect. Her speech is normal and behavior is normal. Abnormal remote memory:          Assessment:       1. Intractable chronic migraine without aura and without status migrainosus    2. Hypothyroidism due to acquired atrophy of thyroid    3. Fatigue, unspecified type    4. Laboratory exam ordered as part of routine general medical examination    5. Mixed hyperlipidemia    6. Moderate persistent reactive airway  disease with acute exacerbation    7. Other long term (current) drug therapy     8. Acute recurrent maxillary sinusitis        Plan:         Jamila was seen today for mammogram.    Diagnoses and all orders for this visit:    Intractable chronic migraine without aura and without status migrainosus: will refer her to neurology for evaluation and treatment options. Will start her on Elavil to see if this helps.   -     Ambulatory referral to Neurology    Hypothyroidism due to acquired atrophy of thyroid: due for labs. Will adjust medication after results obtained.  -     T3; Future  -     T4; Future  -     TSH; Future  -     CBC auto differential; Future  -     Comprehensive metabolic panel; Future    Fatigue, unspecified type: check TSH level. Check HgbA1c.   -     Hemoglobin A1c; Future    Laboratory exam ordered as part of routine general medical examination  -     Lipid panel; Future    Mixed hyperlipidemia: states she tries to eat healthy. She is not able to exercise due to her pelvic and lower leg deformity and pain.  -     Hemoglobin A1c; Future  -     Lipid panel; Future    Moderate persistent reactive airway disease with acute exacerbation: will give taper steroids. Take as directed.     Other long term (current) drug therapy   -     Hemoglobin A1c; Future    Acute recurrent maxillary sinusitis: Due to duration and presenting exam will cover with antibiotics. Take as directed. Complete full course of medication.    Other orders  -     amitriptyline (ELAVIL) 25 MG tablet; Take 1 tablet (25 mg total) by mouth every evening.  -     predniSONE (DELTASONE) 20 MG tablet; Take three tablets in am for 3 days, then two for 3 days, then one for 3 days then 1/2 for 2 days.  -    -     amoxicillin-clavulanate 500-125mg (AUGMENTIN) 500-125 mg Tab; Take 1 tablet (500 mg total) by mouth 2 (two) times daily.  -     promethazine-dextromethorphan (PROMETHAZINE-DM) 6.25-15 mg/5 mL Syrp; Take 5 mLs by mouth every 4 to 6 hours as  needed.    Continue current medication  Take medications only as prescribed  Healthy diet  Adequate rest  Adequate hydration  Avoid allergens  Avoid excessive caffeine

## 2019-07-03 ENCOUNTER — TELEPHONE (OUTPATIENT)
Dept: FAMILY MEDICINE | Facility: CLINIC | Age: 44
End: 2019-07-03

## 2019-07-03 RX ORDER — FLUOXETINE HYDROCHLORIDE 20 MG/1
20 CAPSULE ORAL DAILY
Qty: 30 CAPSULE | Refills: 0 | Status: SHIPPED | OUTPATIENT
Start: 2019-07-03 | End: 2019-09-05 | Stop reason: ALTCHOICE

## 2019-07-11 ENCOUNTER — TELEPHONE (OUTPATIENT)
Dept: FAMILY MEDICINE | Facility: CLINIC | Age: 44
End: 2019-07-11

## 2019-07-11 NOTE — TELEPHONE ENCOUNTER
Called pt and she confirmed that yes she is coming tomorrow for her appt tomorrow with Germaine even with the threat of bad weather.

## 2019-07-12 ENCOUNTER — OFFICE VISIT (OUTPATIENT)
Dept: FAMILY MEDICINE | Facility: CLINIC | Age: 44
End: 2019-07-12
Payer: MEDICARE

## 2019-07-12 VITALS
HEART RATE: 110 BPM | WEIGHT: 241.81 LBS | BODY MASS INDEX: 41.28 KG/M2 | TEMPERATURE: 98 F | RESPIRATION RATE: 20 BRPM | HEIGHT: 64 IN | OXYGEN SATURATION: 98 %

## 2019-07-12 DIAGNOSIS — S38.1XXA CRUSHING INJURY OF PELVIC REGION: ICD-10-CM

## 2019-07-12 DIAGNOSIS — G89.21 CHRONIC PAIN DUE TO INJURY: ICD-10-CM

## 2019-07-12 DIAGNOSIS — G43.911 INTRACTABLE MIGRAINE WITH STATUS MIGRAINOSUS, UNSPECIFIED MIGRAINE TYPE: Primary | ICD-10-CM

## 2019-07-12 DIAGNOSIS — N39.42 URINARY INCONTINENCE WITHOUT SENSORY AWARENESS: ICD-10-CM

## 2019-07-12 DIAGNOSIS — M62.838 MUSCLE SPASMS OF BOTH LOWER EXTREMITIES: ICD-10-CM

## 2019-07-12 LAB
BILIRUB SERPL-MCNC: NEGATIVE MG/DL
BLOOD URINE, POC: NEGATIVE
COLOR, POC UA: ABNORMAL
GLUCOSE UR QL STRIP: NORMAL
KETONES UR QL STRIP: NEGATIVE
LEUKOCYTE ESTERASE URINE, POC: NEGATIVE
NITRITE, POC UA: NEGATIVE
PH, POC UA: 5
PROTEIN, POC: ABNORMAL
SPECIFIC GRAVITY, POC UA: 1.02
UROBILINOGEN, POC UA: NORMAL

## 2019-07-12 PROCEDURE — 99214 PR OFFICE/OUTPT VISIT, EST, LEVL IV, 30-39 MIN: ICD-10-PCS | Mod: 25,S$GLB,, | Performed by: NURSE PRACTITIONER

## 2019-07-12 PROCEDURE — 81002 URINALYSIS NONAUTO W/O SCOPE: CPT | Mod: S$GLB,,, | Performed by: NURSE PRACTITIONER

## 2019-07-12 PROCEDURE — 96372 PR INJECTION,THERAP/PROPH/DIAG2ST, IM OR SUBCUT: ICD-10-PCS | Mod: S$GLB,,, | Performed by: NURSE PRACTITIONER

## 2019-07-12 PROCEDURE — 99214 OFFICE O/P EST MOD 30 MIN: CPT | Mod: 25,S$GLB,, | Performed by: NURSE PRACTITIONER

## 2019-07-12 PROCEDURE — 81002 POCT URINE DIPSTICK WITHOUT MICROSCOPE: ICD-10-PCS | Mod: S$GLB,,, | Performed by: NURSE PRACTITIONER

## 2019-07-12 PROCEDURE — 96372 THER/PROPH/DIAG INJ SC/IM: CPT | Mod: S$GLB,,, | Performed by: NURSE PRACTITIONER

## 2019-07-12 RX ORDER — HYDROCHLOROTHIAZIDE 25 MG/1
25 TABLET ORAL DAILY PRN
Qty: 30 TABLET | Refills: 2 | Status: ON HOLD | OUTPATIENT
Start: 2019-07-12 | End: 2019-07-24 | Stop reason: SDUPTHER

## 2019-07-12 RX ORDER — ONDANSETRON HYDROCHLORIDE 8 MG/1
8 TABLET, FILM COATED ORAL EVERY 8 HOURS PRN
Qty: 20 TABLET | Refills: 2 | Status: SHIPPED | OUTPATIENT
Start: 2019-07-12 | End: 2020-01-02 | Stop reason: SDUPTHER

## 2019-07-12 RX ORDER — RIZATRIPTAN BENZOATE 10 MG/1
10 TABLET ORAL
Qty: 6 TABLET | Refills: 6 | Status: ON HOLD | OUTPATIENT
Start: 2019-07-12 | End: 2019-07-24 | Stop reason: HOSPADM

## 2019-07-12 RX ORDER — KETOROLAC TROMETHAMINE 30 MG/ML
60 INJECTION, SOLUTION INTRAMUSCULAR; INTRAVENOUS
Status: COMPLETED | OUTPATIENT
Start: 2019-07-12 | End: 2019-07-12

## 2019-07-12 RX ORDER — IBUPROFEN 600 MG/1
600 TABLET ORAL 3 TIMES DAILY
Qty: 60 TABLET | Refills: 3 | Status: ON HOLD | OUTPATIENT
Start: 2019-07-12 | End: 2019-07-24 | Stop reason: SDUPTHER

## 2019-07-12 RX ADMIN — KETOROLAC TROMETHAMINE 60 MG: 30 INJECTION, SOLUTION INTRAMUSCULAR; INTRAVENOUS at 03:07

## 2019-07-12 NOTE — PROGRESS NOTES
Subjective:       Patient ID: Jamila Lee is a 43 y.o. female.    Chief Complaint: Migraine    HPI Answers for HPI/ROS submitted by the patient on 7/11/2019   Back pain  Chronicity: chronic  Onset: in the past 7 days  Frequency: constantly  Progression since onset: unchanged  Pain location: gluteal, lumbar spine, sacro-iliac, thoracic spine, costovertebral angle  Pain quality: aching, burning, cramping, shooting, stabbing  Radiates to: left foot, left thigh, right foot, right thigh  Pain - numeric: 8/10  Pain is: the same all the time  Aggravated by: bending, coughing, position, sitting, standing, twisting  Stiffness is present: in the morning, at night  abdominal pain: Yes  bladder incontinence: Yes  bowel incontinence: No  chest pain: No  dysuria: No  fever: No  headaches: Yes  leg pain: Yes  numbness: No  paresis: Yes  paresthesias: Yes  pelvic pain: Yes  perianal numbness: No  tingling: No  weakness: Yes  weight loss: No  genital pain: Yes  hematuria: No  Risk factors: lack of exercise, sedentary lifestyle  Pain severity: severe  Treatments tried: NSAIDs, bed rest, injection treatment, muscle relaxant  Improvement on treatment: moderate    Here with intense migraine symptoms. Elavil is not helping. [place] that it made her really mean. She has used maxalt in the past with zofran and that helped. Started about 3 days ago. Having some urinary incontinence, states she just stands up and urine will release. She states this has been an issue for some time now. No fever. States in chronic pain. She denies any fever, sinus congestion, etc. See ROS.    The following portion of the patients history was reviewed and updated as appropriate: allergies, current medications, past medical and surgical history. Past social history and problem list reviewed. Family PMH and Past social history reviewed. Tobacco, Illicit drug use reviewed.     Review of Systems   Constitutional: Positive for appetite change (decreased due to  "headache) and fatigue. Negative for fever.   Respiratory: Negative for cough, shortness of breath and wheezing.    Cardiovascular: Negative for chest pain, palpitations and leg swelling.   Gastrointestinal: Positive for abdominal pain. Negative for diarrhea, nausea and vomiting.   Genitourinary: Positive for pelvic pain (crush injury) and urgency (has trouble holding urine. incontinence ). Negative for dysuria and hematuria.   Musculoskeletal: Positive for arthralgias (chronic), back pain (chronic) and gait problem (uses wheelchair or cane for support).   Neurological: Positive for weakness and headaches. Negative for numbness.   Psychiatric/Behavioral: Positive for agitation, dysphoric mood and sleep disturbance (due to headache and pain). Negative for self-injury and suicidal ideas.       Objective:     Pulse 110   Temp 98.1 °F (36.7 °C) (Oral)   Resp 20   Ht 5' 4" (1.626 m)   Wt 109.7 kg (241 lb 12.8 oz)   SpO2 98%   BMI 41.50 kg/m²      Physical Exam   Constitutional: She is oriented to person, place, and time. She appears well-developed and well-nourished. She appears ill (appears not to feel well. sitting in the dark due to headache). No distress.   HENT:   Head: Normocephalic.   Right Ear: External ear and ear canal normal. No drainage. Tympanic membrane mobility is normal.   Left Ear: External ear and ear canal normal. No drainage. Tympanic membrane mobility is normal.   Nose: Rhinorrhea present. Right sinus exhibits no maxillary sinus tenderness and no frontal sinus tenderness. Left sinus exhibits no maxillary sinus tenderness and no frontal sinus tenderness.   Mouth/Throat: Uvula is midline and mucous membranes are normal. No oropharyngeal exudate, posterior oropharyngeal erythema or tonsillar abscesses. No tonsillar exudate.   Eyes: Pupils are equal, round, and reactive to light. Conjunctivae, EOM and lids are normal. Right eye exhibits no discharge and no exudate. Left eye exhibits no discharge and " no exudate.   Neck: Trachea normal and normal range of motion. Neck supple. No JVD present. Muscular tenderness present. No spinous process tenderness present. Carotid bruit is not present. No neck rigidity. Normal range of motion present. No thyroid mass and no thyromegaly present.   Cardiovascular: Normal rate, regular rhythm and normal heart sounds. Exam reveals no gallop.   No murmur heard.  Pulses:       Carotid pulses are 2+ on the right side, and 2+ on the left side.  Pulmonary/Chest: No stridor. No respiratory distress. She has no decreased breath sounds. She has no wheezes. She has no rhonchi. She has no rales.   Abdominal: Soft. Bowel sounds are normal. She exhibits no distension. There is no hepatosplenomegaly. There is tenderness in the periumbilical area and suprapubic area. There is no rigidity and no guarding.   Musculoskeletal:   She has difficulty walking due to her crushed pelvis injury. Using a cane for support.   Lymphadenopathy:     She has no cervical adenopathy.        Right: No supraclavicular adenopathy present.        Left: No supraclavicular adenopathy present.   Neurological: She is alert and oriented to person, place, and time. She exhibits abnormal muscle tone. Coordination and gait abnormal.   Skin: Skin is warm and dry. No rash noted. She is not diaphoretic.   Psychiatric: Her speech is normal. Judgment and thought content normal. Her mood appears anxious. She is withdrawn. She exhibits a depressed mood.       Assessment:       1. Intractable migraine with status migrainosus, unspecified migraine type    2. Urinary incontinence without sensory awareness    3. Muscle spasms of both lower extremities    4. Crushing injury of pelvic region    5. Chronic pain due to injury        Plan:         Jamila was seen today for migraine.    Diagnoses and all orders for this visit:    Intractable migraine with status migrainosus, unspecified migraine type: will give toradol injection for acute  headache. Refill her migraine medication.  -     ketorolac injection 60 mg    Urinary incontinence without sensory awareness: urine looked fine.   Results for orders placed or performed in visit on 07/12/19   POCT urine dipstick without microscope   Result Value Ref Range    Color, UA rishi     Spec Grav UA 1.020     pH, UA 5     WBC, UA negative     Nitrite, UA negative     Protein trace     Glucose, UA normal     Ketones, UA negative     Urobilinogen, UA normal     Bilirubin negative     Blood, UA negative      -     POCT urine dipstick without microscope    Muscle spasms of both lower extremities: this is a chronic issue for her. Continue to follow with pain management.    Crushing injury of pelvic region: she is in chronic pain. This is very evident by her behavior and ambulation ability.    Chronic pain due to injury: continue to follow with pain management.     Other orders  -     rizatriptan (MAXALT) 10 MG tablet; Take 1 tablet (10 mg total) by mouth as needed for Migraine.  -     ondansetron (ZOFRAN) 8 MG tablet; Take 1 tablet (8 mg total) by mouth every 8 (eight) hours as needed for Nausea.  -     ibuprofen (ADVIL,MOTRIN) 600 MG tablet; Take 1 tablet (600 mg total) by mouth 3 (three) times daily.  -     hydroCHLOROthiazide (HYDRODIURIL) 25 MG tablet; Take 1 tablet (25 mg total) by mouth daily as needed.    Continue current medication  Take medications only as prescribed  Healthy diet, exercise  Adequate rest  Adequate hydration  Avoid allergens  Avoid excessive caffeine

## 2019-07-12 NOTE — PROGRESS NOTES
Answers for HPI/ROS submitted by the patient on 7/11/2019   Back pain  Chronicity: chronic  Onset: in the past 7 days  Frequency: constantly  Progression since onset: unchanged  Pain location: gluteal, lumbar spine, sacro-iliac, thoracic spine, costovertebral angle  Pain quality: aching, burning, cramping, shooting, stabbing  Radiates to: left foot, left thigh, right foot, right thigh  Pain - numeric: 8/10  Pain is: the same all the time  Aggravated by: bending, coughing, position, sitting, standing, twisting  Stiffness is present: in the morning, at night  abdominal pain: Yes  bladder incontinence: Yes  bowel incontinence: No  chest pain: No  dysuria: No  fever: No  headaches: Yes  leg pain: Yes  numbness: No  paresis: Yes  paresthesias: Yes  pelvic pain: Yes  perianal numbness: No  tingling: No  weakness: Yes  weight loss: No  genital pain: Yes  hematuria: No  Risk factors: lack of exercise, sedentary lifestyle  Pain severity: severe  Treatments tried: NSAIDs, bed rest, injection treatment, muscle relaxant  Improvement on treatment: moderate

## 2019-07-17 ENCOUNTER — PATIENT MESSAGE (OUTPATIENT)
Dept: FAMILY MEDICINE | Facility: CLINIC | Age: 44
End: 2019-07-17

## 2019-07-17 RX ORDER — BUTALBITAL, ACETAMINOPHEN AND CAFFEINE 50; 325; 40 MG/1; MG/1; MG/1
1 TABLET ORAL EVERY 4 HOURS PRN
Qty: 60 TABLET | Refills: 2 | Status: SHIPPED | OUTPATIENT
Start: 2019-07-17 | End: 2019-09-09 | Stop reason: SDUPTHER

## 2019-07-17 NOTE — TELEPHONE ENCOUNTER
----- Message from Stephane Terrell sent at 7/17/2019  4:06 PM CDT -----  Contact: self   Type:  RX Refill Request    Who Called:  Patient   Refill or New Rx: new rx   RX Name and Strength:  Fioricet  Preferred Pharmacy with phone number:    [facility] -  - [address]  [address] [address]  Phone: [phone] Fax: [phone]  Local or Mail Order: local   Best Call Back Number: [phone] (home)

## 2019-07-21 PROBLEM — T50.902A SUICIDAL OVERDOSE, INITIAL ENCOUNTER: Status: ACTIVE | Noted: 2019-07-21

## 2019-07-21 PROBLEM — T50.901A OVERDOSE: Status: ACTIVE | Noted: 2019-07-21

## 2019-07-21 PROBLEM — F23 ACUTE PSYCHOSIS: Status: ACTIVE | Noted: 2019-07-21

## 2019-07-21 PROBLEM — R45.1 AGITATION: Status: ACTIVE | Noted: 2019-07-21

## 2019-07-23 PROBLEM — R94.31 PROLONGED Q-T INTERVAL ON ECG: Status: ACTIVE | Noted: 2019-07-23

## 2019-07-23 PROBLEM — E21.0 PRIMARY HYPERPARATHYROIDISM: Status: ACTIVE | Noted: 2019-07-23

## 2019-07-23 PROBLEM — E87.6 HYPOKALEMIA: Status: ACTIVE | Noted: 2019-07-23

## 2019-07-23 PROBLEM — E55.9 HYPOVITAMINOSIS D: Status: ACTIVE | Noted: 2019-07-23

## 2019-08-06 ENCOUNTER — OFFICE VISIT (OUTPATIENT)
Dept: FAMILY MEDICINE | Facility: CLINIC | Age: 44
End: 2019-08-06
Payer: MEDICARE

## 2019-08-06 VITALS
HEART RATE: 93 BPM | DIASTOLIC BLOOD PRESSURE: 84 MMHG | OXYGEN SATURATION: 95 % | SYSTOLIC BLOOD PRESSURE: 152 MMHG | HEIGHT: 64 IN | BODY MASS INDEX: 40.91 KG/M2 | RESPIRATION RATE: 18 BRPM | WEIGHT: 239.63 LBS | TEMPERATURE: 99 F

## 2019-08-06 DIAGNOSIS — R73.09 ELEVATED GLUCOSE: ICD-10-CM

## 2019-08-06 DIAGNOSIS — M25.50 ARTHRALGIA, UNSPECIFIED JOINT: ICD-10-CM

## 2019-08-06 DIAGNOSIS — E03.4 HYPOTHYROIDISM DUE TO ACQUIRED ATROPHY OF THYROID: ICD-10-CM

## 2019-08-06 DIAGNOSIS — G89.21 CHRONIC PAIN DUE TO TRAUMA: ICD-10-CM

## 2019-08-06 DIAGNOSIS — E78.5 HYPERLIPIDEMIA, UNSPECIFIED HYPERLIPIDEMIA TYPE: ICD-10-CM

## 2019-08-06 DIAGNOSIS — I89.0 LYMPHEDEMA OF RIGHT LOWER EXTREMITY: Primary | ICD-10-CM

## 2019-08-06 DIAGNOSIS — F11.90 CHRONIC NARCOTIC USE: ICD-10-CM

## 2019-08-06 DIAGNOSIS — Z00.00 LABORATORY EXAM ORDERED AS PART OF ROUTINE GENERAL MEDICAL EXAMINATION: ICD-10-CM

## 2019-08-06 DIAGNOSIS — I10 BENIGN ESSENTIAL HTN: ICD-10-CM

## 2019-08-06 LAB
BASOPHILS # BLD AUTO: 0.05 K/UL (ref 0–0.2)
BASOPHILS NFR BLD: 0.6 % (ref 0–1.9)
DIFFERENTIAL METHOD: ABNORMAL
EOSINOPHIL # BLD AUTO: 0.3 K/UL (ref 0–0.5)
EOSINOPHIL NFR BLD: 3.2 % (ref 0–8)
ERYTHROCYTE [DISTWIDTH] IN BLOOD BY AUTOMATED COUNT: 12.5 % (ref 11.5–14.5)
HCT VFR BLD AUTO: 35.4 % (ref 37–48.5)
HGB BLD-MCNC: 11.8 G/DL (ref 12–16)
IMM GRANULOCYTES # BLD AUTO: 0.06 K/UL (ref 0–0.04)
IMM GRANULOCYTES NFR BLD AUTO: 0.7 % (ref 0–0.5)
LYMPHOCYTES # BLD AUTO: 3 K/UL (ref 1–4.8)
LYMPHOCYTES NFR BLD: 37 % (ref 18–48)
MCH RBC QN AUTO: 28.9 PG (ref 27–31)
MCHC RBC AUTO-ENTMCNC: 33.3 G/DL (ref 32–36)
MCV RBC AUTO: 87 FL (ref 82–98)
MONOCYTES # BLD AUTO: 0.5 K/UL (ref 0.3–1)
MONOCYTES NFR BLD: 5.9 % (ref 4–15)
NEUTROPHILS # BLD AUTO: 4.2 K/UL (ref 1.8–7.7)
NEUTROPHILS NFR BLD: 52.6 % (ref 38–73)
NRBC BLD-RTO: 0 /100 WBC
PLATELET # BLD AUTO: 249 K/UL (ref 150–350)
PMV BLD AUTO: 12.1 FL (ref 9.2–12.9)
RBC # BLD AUTO: 4.08 M/UL (ref 4–5.4)
TSH SERPL DL<=0.005 MIU/L-ACNC: 1.47 UIU/ML (ref 0.4–4)
WBC # BLD AUTO: 8.08 K/UL (ref 3.9–12.7)

## 2019-08-06 PROCEDURE — 80061 LIPID PANEL: CPT

## 2019-08-06 PROCEDURE — 99214 OFFICE O/P EST MOD 30 MIN: CPT | Mod: S$GLB,,, | Performed by: NURSE PRACTITIONER

## 2019-08-06 PROCEDURE — 99214 PR OFFICE/OUTPT VISIT, EST, LEVL IV, 30-39 MIN: ICD-10-PCS | Mod: S$GLB,,, | Performed by: NURSE PRACTITIONER

## 2019-08-06 PROCEDURE — 83036 HEMOGLOBIN GLYCOSYLATED A1C: CPT

## 2019-08-06 PROCEDURE — 36415 PR COLLECTION VENOUS BLOOD,VENIPUNCTURE: ICD-10-PCS | Mod: S$GLB,,, | Performed by: NURSE PRACTITIONER

## 2019-08-06 PROCEDURE — 85025 COMPLETE CBC W/AUTO DIFF WBC: CPT

## 2019-08-06 PROCEDURE — 80053 COMPREHEN METABOLIC PANEL: CPT

## 2019-08-06 PROCEDURE — 84443 ASSAY THYROID STIM HORMONE: CPT

## 2019-08-06 PROCEDURE — 36415 COLL VENOUS BLD VENIPUNCTURE: CPT | Mod: S$GLB,,, | Performed by: NURSE PRACTITIONER

## 2019-08-06 PROCEDURE — 84550 ASSAY OF BLOOD/URIC ACID: CPT

## 2019-08-06 RX ORDER — MIRTAZAPINE 30 MG/1
1 TABLET, FILM COATED ORAL NIGHTLY
Refills: 0 | COMMUNITY
Start: 2019-07-30 | End: 2020-07-20 | Stop reason: SDUPTHER

## 2019-08-06 RX ORDER — RIZATRIPTAN BENZOATE 10 MG/1
1 TABLET, ORALLY DISINTEGRATING ORAL
Refills: 6 | COMMUNITY
Start: 2019-07-12 | End: 2020-01-02 | Stop reason: SDUPTHER

## 2019-08-06 RX ORDER — LISINOPRIL 10 MG/1
10 TABLET ORAL DAILY
Qty: 90 TABLET | Refills: 0 | Status: SHIPPED | OUTPATIENT
Start: 2019-08-06 | End: 2019-09-05 | Stop reason: SDUPTHER

## 2019-08-06 RX ORDER — FUROSEMIDE 20 MG/1
20 TABLET ORAL DAILY
Qty: 30 TABLET | Refills: 4 | Status: SHIPPED | OUTPATIENT
Start: 2019-08-06 | End: 2019-09-20 | Stop reason: SDUPTHER

## 2019-08-06 NOTE — PROGRESS NOTES
Venipuncture performed with 23 gauge butterfly, x's 1 attempt,  to left hand.  Specimens collected per orders.      Pressure dressing applied to site, instructed patient to remove dressing in 10-15 minutes, OK to re-adjust dressing if pressure causing any discomfort, to observe closely for numbness and/or discoloration to hand or fingers, and to notify provider if bleeding persists after applying constant pressure lasting 30 minutes.
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
## 2019-08-07 ENCOUNTER — PATIENT MESSAGE (OUTPATIENT)
Dept: FAMILY MEDICINE | Facility: CLINIC | Age: 44
End: 2019-08-07

## 2019-08-07 LAB
ALBUMIN SERPL BCP-MCNC: 3.7 G/DL (ref 3.5–5.2)
ALP SERPL-CCNC: 95 U/L (ref 55–135)
ALT SERPL W/O P-5'-P-CCNC: 27 U/L (ref 10–44)
ANION GAP SERPL CALC-SCNC: 15 MMOL/L (ref 8–16)
AST SERPL-CCNC: 28 U/L (ref 10–40)
BILIRUB SERPL-MCNC: 0.1 MG/DL (ref 0.1–1)
BUN SERPL-MCNC: 10 MG/DL (ref 6–20)
CALCIUM SERPL-MCNC: 9.8 MG/DL (ref 8.7–10.5)
CHLORIDE SERPL-SCNC: 102 MMOL/L (ref 95–110)
CHOLEST SERPL-MCNC: 243 MG/DL (ref 120–199)
CHOLEST/HDLC SERPL: 7.4 {RATIO} (ref 2–5)
CO2 SERPL-SCNC: 22 MMOL/L (ref 23–29)
CREAT SERPL-MCNC: 0.7 MG/DL (ref 0.5–1.4)
EST. GFR  (AFRICAN AMERICAN): >60 ML/MIN/1.73 M^2
EST. GFR  (NON AFRICAN AMERICAN): >60 ML/MIN/1.73 M^2
ESTIMATED AVG GLUCOSE: 114 MG/DL (ref 68–131)
GLUCOSE SERPL-MCNC: 89 MG/DL (ref 70–110)
HBA1C MFR BLD HPLC: 5.6 % (ref 4–5.6)
HDLC SERPL-MCNC: 33 MG/DL (ref 40–75)
HDLC SERPL: 13.6 % (ref 20–50)
LDLC SERPL CALC-MCNC: 144.6 MG/DL (ref 63–159)
NONHDLC SERPL-MCNC: 210 MG/DL
POTASSIUM SERPL-SCNC: 3.9 MMOL/L (ref 3.5–5.1)
PROT SERPL-MCNC: 7.1 G/DL (ref 6–8.4)
SODIUM SERPL-SCNC: 139 MMOL/L (ref 136–145)
TRIGL SERPL-MCNC: 327 MG/DL (ref 30–150)
URATE SERPL-MCNC: 4.8 MG/DL (ref 2.4–5.7)

## 2019-08-12 ENCOUNTER — TELEPHONE (OUTPATIENT)
Dept: FAMILY MEDICINE | Facility: CLINIC | Age: 44
End: 2019-08-12

## 2019-08-12 DIAGNOSIS — R60.0 LOWER EXTREMITY EDEMA: ICD-10-CM

## 2019-08-12 DIAGNOSIS — Z87.828 HISTORY OF PELVIC TRAUMA: Primary | ICD-10-CM

## 2019-08-12 DIAGNOSIS — S38.1XXD CRUSHING INJURY OF PELVIS, SUBSEQUENT ENCOUNTER: ICD-10-CM

## 2019-08-12 DIAGNOSIS — L53.9 ERYTHEMA: ICD-10-CM

## 2019-08-12 RX ORDER — SULFAMETHOXAZOLE AND TRIMETHOPRIM 800; 160 MG/1; MG/1
1 TABLET ORAL 2 TIMES DAILY
Qty: 20 TABLET | Refills: 0 | Status: SHIPPED | OUTPATIENT
Start: 2019-08-12 | End: 2019-09-05 | Stop reason: ALTCHOICE

## 2019-08-12 NOTE — TELEPHONE ENCOUNTER
----- Message from Melva Lemons sent at 8/12/2019  9:00 AM CDT -----  Type: Needs Medical Advice    Who Called:  patient  Symptoms (please be specific):  Legs and feet swelling  How long has patient had these symptoms:  saad  Pharmacy name and phone #:  saad  Best Call Back Number: [phone]  Additional Information: patient was seen last week for legs and feet swelling and stomach is swollen/she is not any better and is asking to speak with the nurse/patient is asking if nurse has found a Lymphedema clinic for her to go to as she feels she is getting worse

## 2019-08-12 NOTE — TELEPHONE ENCOUNTER
LM for patient to return call to clinic. Per chart, patient opted to seek treatment via ER prior to receiving return phone call with provider suggestions.

## 2019-08-12 NOTE — TELEPHONE ENCOUNTER
"Spoke to patient, states she was advised by provider to call clinic if swelling has not improved. States she has an appointment with lymphedema clinic scheduled for 9/1/19. Patient states she is still experiencing prominent swelling/redness to right leg/kneecap. Swelling began to left ankle/foot x 3 days ago, brownish color. Patient states "pain is unreal, I can't even walk and I've got a lot of redness and I even look like I'm 9 months pregnant." Patient states she has been taking lasix as prescribed but states she has not noticed excess urine output, states she is also experiencing back pain but denies any other symptoms, unsure if she is running a fever. Please advise.    "

## 2019-08-12 NOTE — TELEPHONE ENCOUNTER
I put in orders for a CT that shows the blood flow through the pelvis and lower legs to see what is the issue. I don't know of any where else to get the treatment for the lymphedema, keep that appointment she already has. I will send on antibiotics in case some infection is there. Other options is to go to the ER for workup and treatment. She needs referral to vascular. I put in referral to Dr. Garn[facility] with [facility] on [address]. Please give her the number so she can schedule appointment.

## 2019-08-22 ENCOUNTER — PATIENT OUTREACH (OUTPATIENT)
Dept: ADMINISTRATIVE | Facility: HOSPITAL | Age: 44
End: 2019-08-22

## 2019-08-28 ENCOUNTER — TELEPHONE (OUTPATIENT)
Dept: FAMILY MEDICINE | Facility: CLINIC | Age: 44
End: 2019-08-28

## 2019-08-28 NOTE — TELEPHONE ENCOUNTER
Called pt and left her a message to please return my call at [phone] as her appt with Germaine Wesley, NP needs to be rescheduled because she will not be here on 9/6/19 from 0920 to 1040.  My chart message also sent.

## 2019-08-30 ENCOUNTER — PATIENT MESSAGE (OUTPATIENT)
Dept: FAMILY MEDICINE | Facility: CLINIC | Age: 44
End: 2019-08-30

## 2019-09-02 RX ORDER — DIAZEPAM 10 MG/1
TABLET ORAL
Qty: 60 TABLET | Refills: 0 | Status: SHIPPED | OUTPATIENT
Start: 2019-09-02 | End: 2019-09-24 | Stop reason: SDUPTHER

## 2019-09-02 RX ORDER — TEMAZEPAM 30 MG/1
30 CAPSULE ORAL NIGHTLY PRN
Qty: 30 CAPSULE | Refills: 0 | Status: SHIPPED | OUTPATIENT
Start: 2019-09-02 | End: 2019-09-05 | Stop reason: SDUPTHER

## 2019-09-05 ENCOUNTER — OFFICE VISIT (OUTPATIENT)
Dept: FAMILY MEDICINE | Facility: CLINIC | Age: 44
End: 2019-09-05
Payer: MEDICARE

## 2019-09-05 VITALS
BODY MASS INDEX: 38.01 KG/M2 | RESPIRATION RATE: 18 BRPM | SYSTOLIC BLOOD PRESSURE: 126 MMHG | TEMPERATURE: 99 F | OXYGEN SATURATION: 96 % | DIASTOLIC BLOOD PRESSURE: 74 MMHG | HEART RATE: 70 BPM | HEIGHT: 64 IN | WEIGHT: 222.63 LBS

## 2019-09-05 DIAGNOSIS — N95.1 MENOPAUSAL SYMPTOM: ICD-10-CM

## 2019-09-05 DIAGNOSIS — F51.01 PRIMARY INSOMNIA: ICD-10-CM

## 2019-09-05 DIAGNOSIS — M62.838 MUSCLE SPASMS OF BOTH LOWER EXTREMITIES: ICD-10-CM

## 2019-09-05 DIAGNOSIS — I10 ESSENTIAL HYPERTENSION: Primary | ICD-10-CM

## 2019-09-05 DIAGNOSIS — S38.1XXA CRUSHING INJURY OF PELVIC REGION: ICD-10-CM

## 2019-09-05 DIAGNOSIS — G89.21 CHRONIC PAIN DUE TO INJURY: ICD-10-CM

## 2019-09-05 DIAGNOSIS — Z00.00 LABORATORY EXAM ORDERED AS PART OF ROUTINE GENERAL MEDICAL EXAMINATION: ICD-10-CM

## 2019-09-05 DIAGNOSIS — E03.4 HYPOTHYROIDISM DUE TO ACQUIRED ATROPHY OF THYROID: ICD-10-CM

## 2019-09-05 DIAGNOSIS — R60.0 LOWER EXTREMITY EDEMA: ICD-10-CM

## 2019-09-05 LAB
ALBUMIN SERPL BCP-MCNC: 4.3 G/DL (ref 3.5–5.2)
ALP SERPL-CCNC: 93 U/L (ref 55–135)
ALT SERPL W/O P-5'-P-CCNC: 11 U/L (ref 10–44)
ANION GAP SERPL CALC-SCNC: 9 MMOL/L (ref 8–16)
AST SERPL-CCNC: 14 U/L (ref 10–40)
BILIRUB SERPL-MCNC: 0.2 MG/DL (ref 0.1–1)
BUN SERPL-MCNC: 13 MG/DL (ref 6–20)
CALCIUM SERPL-MCNC: 9.5 MG/DL (ref 8.7–10.5)
CHLORIDE SERPL-SCNC: 106 MMOL/L (ref 95–110)
CO2 SERPL-SCNC: 23 MMOL/L (ref 23–29)
CREAT SERPL-MCNC: 0.8 MG/DL (ref 0.5–1.4)
EST. GFR  (AFRICAN AMERICAN): >60 ML/MIN/1.73 M^2
EST. GFR  (NON AFRICAN AMERICAN): >60 ML/MIN/1.73 M^2
ESTRADIOL SERPL-MCNC: 47 PG/ML
GLUCOSE SERPL-MCNC: 92 MG/DL (ref 70–110)
POTASSIUM SERPL-SCNC: 3.9 MMOL/L (ref 3.5–5.1)
PROGEST SERPL-MCNC: 0.1 NG/ML
PROT SERPL-MCNC: 7.2 G/DL (ref 6–8.4)
SODIUM SERPL-SCNC: 138 MMOL/L (ref 136–145)
T4 FREE SERPL-MCNC: 0.89 NG/DL (ref 0.71–1.51)
TESTOST SERPL-MCNC: 11 NG/DL (ref 5–73)
TSH SERPL DL<=0.005 MIU/L-ACNC: 5.31 UIU/ML (ref 0.4–4)

## 2019-09-05 PROCEDURE — 84144 ASSAY OF PROGESTERONE: CPT

## 2019-09-05 PROCEDURE — 99214 OFFICE O/P EST MOD 30 MIN: CPT | Mod: S$GLB,,, | Performed by: NURSE PRACTITIONER

## 2019-09-05 PROCEDURE — 84403 ASSAY OF TOTAL TESTOSTERONE: CPT

## 2019-09-05 PROCEDURE — 80053 COMPREHEN METABOLIC PANEL: CPT

## 2019-09-05 PROCEDURE — 84443 ASSAY THYROID STIM HORMONE: CPT

## 2019-09-05 PROCEDURE — 99214 PR OFFICE/OUTPT VISIT, EST, LEVL IV, 30-39 MIN: ICD-10-PCS | Mod: S$GLB,,, | Performed by: NURSE PRACTITIONER

## 2019-09-05 PROCEDURE — 82670 ASSAY OF TOTAL ESTRADIOL: CPT

## 2019-09-05 PROCEDURE — 84439 ASSAY OF FREE THYROXINE: CPT

## 2019-09-05 RX ORDER — BUTALBITAL, ACETAMINOPHEN AND CAFFEINE 50; 325; 40 MG/1; MG/1; MG/1
1 TABLET ORAL 3 TIMES DAILY PRN
Refills: 2 | COMMUNITY
Start: 2019-08-26 | End: 2020-01-02 | Stop reason: SDUPTHER

## 2019-09-05 RX ORDER — METHADONE HYDROCHLORIDE 10 MG/1
30 TABLET ORAL 2 TIMES DAILY
Status: ON HOLD | COMMUNITY
End: 2022-01-12

## 2019-09-05 RX ORDER — CARBAMAZEPINE 200 MG/1
200 TABLET, EXTENDED RELEASE ORAL 2 TIMES DAILY
COMMUNITY
Start: 2018-07-25 | End: 2020-01-11

## 2019-09-05 RX ORDER — LISINOPRIL 10 MG/1
10 TABLET ORAL DAILY
Qty: 90 TABLET | Refills: 0 | Status: SHIPPED | OUTPATIENT
Start: 2019-09-05 | End: 2019-09-20 | Stop reason: SDUPTHER

## 2019-09-05 RX ORDER — LEVOTHYROXINE SODIUM 175 UG/1
0.17 TABLET ORAL DAILY
COMMUNITY
End: 2019-09-05 | Stop reason: SDUPTHER

## 2019-09-05 RX ORDER — LIDOCAINE 50 MG/G
PATCH TOPICAL
COMMUNITY
Start: 2018-07-25 | End: 2019-09-05 | Stop reason: SDUPTHER

## 2019-09-05 RX ORDER — VENLAFAXINE HYDROCHLORIDE 75 MG/1
75 CAPSULE, EXTENDED RELEASE ORAL DAILY
Qty: 90 CAPSULE | Refills: 2 | Status: SHIPPED | OUTPATIENT
Start: 2019-09-05 | End: 2019-09-20 | Stop reason: SDUPTHER

## 2019-09-05 RX ORDER — LEVOTHYROXINE SODIUM 175 UG/1
175 TABLET ORAL DAILY
Qty: 90 TABLET | Refills: 1 | Status: SHIPPED | OUTPATIENT
Start: 2019-09-05 | End: 2019-09-20 | Stop reason: SDUPTHER

## 2019-09-05 RX ORDER — GABAPENTIN 600 MG/1
1 TABLET ORAL 3 TIMES DAILY
Refills: 2 | COMMUNITY
Start: 2019-08-22 | End: 2020-01-11

## 2019-09-05 RX ORDER — TEMAZEPAM 30 MG/1
30 CAPSULE ORAL NIGHTLY PRN
Qty: 30 CAPSULE | Refills: 2 | OUTPATIENT
Start: 2019-09-05

## 2019-09-05 NOTE — PROGRESS NOTES
Venipuncture performed with 23 gauge butterfly, x's 2 attempt,  to left hand vein.  Specimens collected per orders.      Pressure dressing applied to site, instructed patient to remove dressing in 10-15 minutes, OK to re-adjust dressing if pressure causing any discomfort, to observe closely for numbness and/or discoloration to hand or fingers, and to notify provider if bleeding persists after applying constant pressure lasting 30 minutes.
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
## 2019-09-06 ENCOUNTER — TELEPHONE (OUTPATIENT)
Dept: FAMILY MEDICINE | Facility: CLINIC | Age: 44
End: 2019-09-06

## 2019-09-06 PROBLEM — F23 ACUTE PSYCHOSIS: Status: RESOLVED | Noted: 2019-07-21 | Resolved: 2019-09-06

## 2019-09-06 PROBLEM — R07.9 CHEST PAIN: Status: RESOLVED | Noted: 2018-05-30 | Resolved: 2019-09-06

## 2019-09-06 PROBLEM — R10.9 ABDOMINAL PAIN: Status: RESOLVED | Noted: 2018-07-10 | Resolved: 2019-09-06

## 2019-09-06 PROBLEM — R45.1 AGITATION: Status: RESOLVED | Noted: 2019-07-21 | Resolved: 2019-09-06

## 2019-09-06 RX ORDER — LIOTHYRONINE SODIUM 25 UG/1
25 TABLET ORAL DAILY
Qty: 90 TABLET | Refills: 2 | Status: SHIPPED | OUTPATIENT
Start: 2019-09-06 | End: 2019-09-20 | Stop reason: SDUPTHER

## 2019-09-06 NOTE — TELEPHONE ENCOUNTER
----- Message from Pricila Caro sent at 9/6/2019 12:59 PM CDT -----  Contact: Self  Type:  Patient Returning Call    Who Called:  patient  Who Left Message for Patient:  April  Does the patient know what this is regarding?:  results  Best Call Back Number:  [phone] (home)   Additional Information:  na

## 2019-09-09 RX ORDER — BUTALBITAL, ACETAMINOPHEN AND CAFFEINE 50; 325; 40 MG/1; MG/1; MG/1
TABLET ORAL
Qty: 60 TABLET | Refills: 2 | Status: SHIPPED | OUTPATIENT
Start: 2019-09-09 | End: 2019-10-04 | Stop reason: SDUPTHER

## 2019-09-17 RX ORDER — FLUOXETINE HYDROCHLORIDE 20 MG/1
20 CAPSULE ORAL DAILY
Qty: 30 CAPSULE | Refills: 4 | Status: SHIPPED | OUTPATIENT
Start: 2019-09-17 | End: 2019-09-20 | Stop reason: SDUPTHER

## 2019-09-20 ENCOUNTER — TELEPHONE (OUTPATIENT)
Dept: FAMILY MEDICINE | Facility: CLINIC | Age: 44
End: 2019-09-20

## 2019-09-20 ENCOUNTER — OFFICE VISIT (OUTPATIENT)
Dept: FAMILY MEDICINE | Facility: CLINIC | Age: 44
End: 2019-09-20
Payer: MEDICARE

## 2019-09-20 DIAGNOSIS — I10 ESSENTIAL HYPERTENSION: ICD-10-CM
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
## 2019-09-20 DIAGNOSIS — F41.9 ANXIETY: Primary | ICD-10-CM

## 2019-09-20 DIAGNOSIS — T78.40XA ALLERGIC REACTION, INITIAL ENCOUNTER: ICD-10-CM

## 2019-09-20 DIAGNOSIS — F41.0 PANIC ATTACK: ICD-10-CM

## 2019-09-20 PROCEDURE — 99214 PR OFFICE/OUTPT VISIT, EST, LEVL IV, 30-39 MIN: ICD-10-PCS | Mod: 25,S$GLB,, | Performed by: NURSE PRACTITIONER

## 2019-09-20 PROCEDURE — 96372 THER/PROPH/DIAG INJ SC/IM: CPT | Mod: S$GLB,,, | Performed by: NURSE PRACTITIONER

## 2019-09-20 PROCEDURE — 99214 OFFICE O/P EST MOD 30 MIN: CPT | Mod: 25,S$GLB,, | Performed by: NURSE PRACTITIONER

## 2019-09-20 PROCEDURE — 96372 PR INJECTION,THERAP/PROPH/DIAG2ST, IM OR SUBCUT: ICD-10-PCS | Mod: S$GLB,,, | Performed by: NURSE PRACTITIONER

## 2019-09-20 RX ORDER — LISINOPRIL 10 MG/1
10 TABLET ORAL DAILY
Qty: 90 TABLET | Refills: 1 | Status: SHIPPED | OUTPATIENT
Start: 2019-09-20 | End: 2019-09-27 | Stop reason: SDUPTHER

## 2019-09-20 RX ORDER — DIPHENHYDRAMINE HYDROCHLORIDE 50 MG/ML
50 INJECTION INTRAMUSCULAR; INTRAVENOUS ONCE
Status: COMPLETED | OUTPATIENT
Start: 2019-09-20 | End: 2019-09-20

## 2019-09-20 RX ORDER — DEXAMETHASONE SODIUM PHOSPHATE 4 MG/ML
4 INJECTION, SOLUTION INTRA-ARTICULAR; INTRALESIONAL; INTRAMUSCULAR; INTRAVENOUS; SOFT TISSUE ONCE
Status: DISCONTINUED | OUTPATIENT
Start: 2019-09-20 | End: 2019-09-20

## 2019-09-20 RX ORDER — FUROSEMIDE 20 MG/1
20 TABLET ORAL DAILY
Qty: 30 TABLET | Refills: 4 | Status: SHIPPED | OUTPATIENT
Start: 2019-09-20 | End: 2020-01-11

## 2019-09-20 RX ORDER — HYDROXYZINE HYDROCHLORIDE 25 MG/1
25 TABLET, FILM COATED ORAL 3 TIMES DAILY PRN
Qty: 30 TABLET | Refills: 0 | Status: SHIPPED | OUTPATIENT
Start: 2019-09-20 | End: 2020-01-03 | Stop reason: ALTCHOICE

## 2019-09-20 RX ORDER — FLUOXETINE HYDROCHLORIDE 20 MG/1
20 CAPSULE ORAL DAILY
Qty: 30 CAPSULE | Refills: 4 | Status: SHIPPED | OUTPATIENT
Start: 2019-09-20 | End: 2019-09-27 | Stop reason: SDUPTHER

## 2019-09-20 RX ORDER — ATORVASTATIN CALCIUM 10 MG/1
10 TABLET, FILM COATED ORAL DAILY
Qty: 30 TABLET | Refills: 3 | Status: SHIPPED | OUTPATIENT
Start: 2019-09-20 | End: 2019-12-16 | Stop reason: SDUPTHER

## 2019-09-20 RX ORDER — LIOTHYRONINE SODIUM 25 UG/1
25 TABLET ORAL DAILY
Qty: 90 TABLET | Refills: 2 | Status: SHIPPED | OUTPATIENT
Start: 2019-09-20 | End: 2019-12-30 | Stop reason: SDUPTHER

## 2019-09-20 RX ORDER — LEVOTHYROXINE SODIUM 175 UG/1
175 TABLET ORAL DAILY
Qty: 90 TABLET | Refills: 1 | Status: SHIPPED | OUTPATIENT
Start: 2019-09-20 | End: 2019-12-30 | Stop reason: SDUPTHER

## 2019-09-20 RX ORDER — DEXAMETHASONE SODIUM PHOSPHATE 4 MG/ML
8 INJECTION, SOLUTION INTRA-ARTICULAR; INTRALESIONAL; INTRAMUSCULAR; INTRAVENOUS; SOFT TISSUE ONCE
Status: COMPLETED | OUTPATIENT
Start: 2019-09-20 | End: 2019-09-20

## 2019-09-20 RX ORDER — VENLAFAXINE HYDROCHLORIDE 75 MG/1
75 CAPSULE, EXTENDED RELEASE ORAL DAILY
Qty: 90 CAPSULE | Refills: 2 | Status: SHIPPED | OUTPATIENT
Start: 2019-09-20 | End: 2019-09-27

## 2019-09-20 RX ADMIN — DEXAMETHASONE SODIUM PHOSPHATE 8 MG: 4 INJECTION, SOLUTION INTRA-ARTICULAR; INTRALESIONAL; INTRAMUSCULAR; INTRAVENOUS; SOFT TISSUE at 11:09

## 2019-09-20 RX ADMIN — DIPHENHYDRAMINE HYDROCHLORIDE 50 MG: 50 INJECTION INTRAMUSCULAR; INTRAVENOUS at 11:09

## 2019-09-20 NOTE — TELEPHONE ENCOUNTER
----- Message from Germaine Wesley NP sent at 9/20/2019 12:16 PM CDT -----  Call and let her know that I sent refills of her medications to the pharmacy, just the ones she gets from me.
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
## 2019-09-20 NOTE — TELEPHONE ENCOUNTER
----- Message from Rebecca Kothari sent at 9/20/2019  7:41 AM CDT -----  Type: Needs earlier appointment time today    Who Called:  Patient  Best Call Back Number: [phone]  Additional Information: Patient stated she was just released from [facility] last night/was seen for severe anxiety/stated that she has bug bites all over her body and it is making her more anxious/made appointment for 11:40 today but would like to be seen earlier if possible/please call patient back to reschedule or advise.

## 2019-09-20 NOTE — TELEPHONE ENCOUNTER
LM to notify patient prescription refills have been sent to the pharmacy. Included contact information for return call to clinic with any additional questions/concerns.

## 2019-09-21 VITALS
TEMPERATURE: 99 F | WEIGHT: 213.38 LBS | BODY MASS INDEX: 36.43 KG/M2 | OXYGEN SATURATION: 95 % | RESPIRATION RATE: 20 BRPM | HEIGHT: 64 IN | DIASTOLIC BLOOD PRESSURE: 78 MMHG | HEART RATE: 105 BPM | SYSTOLIC BLOOD PRESSURE: 139 MMHG

## 2019-09-24 ENCOUNTER — PATIENT MESSAGE (OUTPATIENT)
Dept: FAMILY MEDICINE | Facility: CLINIC | Age: 44
End: 2019-09-24

## 2019-09-24 ENCOUNTER — TELEPHONE (OUTPATIENT)
Dept: FAMILY MEDICINE | Facility: CLINIC | Age: 44
End: 2019-09-24

## 2019-09-24 RX ORDER — IBUPROFEN 600 MG/1
TABLET ORAL
Qty: 60 TABLET | Refills: 3 | Status: SHIPPED | OUTPATIENT
Start: 2019-09-24 | End: 2020-01-11

## 2019-09-24 RX ORDER — TEMAZEPAM 30 MG/1
30 CAPSULE ORAL NIGHTLY PRN
Qty: 30 CAPSULE | Refills: 0 | Status: SHIPPED | OUTPATIENT
Start: 2019-09-24 | End: 2019-10-24 | Stop reason: SDUPTHER

## 2019-09-24 RX ORDER — DIAZEPAM 10 MG/1
TABLET ORAL
Qty: 60 TABLET | Refills: 0 | Status: SHIPPED | OUTPATIENT
Start: 2019-09-24 | End: 2019-10-24 | Stop reason: SDUPTHER

## 2019-09-24 NOTE — TELEPHONE ENCOUNTER
Notified pharmacy ok to refill valium early, also requesting provider ok to fill temazepam early as well. Also see My Chart message

## 2019-09-24 NOTE — TELEPHONE ENCOUNTER
I see that pharmacy has already sent you the new refills for both meds.  They are pending in another encounter.

## 2019-09-24 NOTE — TELEPHONE ENCOUNTER
----- Message from Rosibel Hogue sent at 9/24/2019 10:40 AM CDT -----  Contact: Anay with [facility]  Type:  Pharmacy Calling to Clarify an RX    Name of Caller:  Anay  Pharmacy Name:  [facility]  Prescription Name:  diazePAM (VALIUM) 10 MG Tab; Tamezpam  What do they need to clarify?:  refill  Best Call Back Number:  [phone]  Additional Information:  Patient discarded her medications due to bugs and would like to get new prescriptions. Please call Anay. Thanks!

## 2019-09-24 NOTE — TELEPHONE ENCOUNTER
----- Message from Thania Adam sent at 9/24/2019  1:05 PM CDT -----  Contact: [facility]  Type:  RX Refill Request    Who Called:  Brad  Refill or New Rx:  refill  RX Name and Strength:  diazePAM (VALIUM) 10 MG Tab and   How is the patient currently taking it? (ex. 1XDay):  : TAKE 1 TABLET (10 MG TOTAL) BY MOUTH TWO TIMES DAILY.  Is this a 30 day or 90 day RX:  90  Preferred Pharmacy with phone number:    [facility] - [place], [place] - [address] [facility] [address]  [address] [address]  Phone: [phone] Fax: [phone]  Local or Mail Order:  local  Ordering Provider:  guillermo Amezcua Call Back Number:  [phone]  Additional Information:  Please Advise ---Thank you

## 2019-09-27 ENCOUNTER — OFFICE VISIT (OUTPATIENT)
Dept: FAMILY MEDICINE | Facility: CLINIC | Age: 44
End: 2019-09-27
Payer: MEDICARE

## 2019-09-27 VITALS
DIASTOLIC BLOOD PRESSURE: 74 MMHG | RESPIRATION RATE: 17 BRPM | SYSTOLIC BLOOD PRESSURE: 139 MMHG | WEIGHT: 208 LBS | OXYGEN SATURATION: 97 % | HEART RATE: 84 BPM | BODY MASS INDEX: 35.7 KG/M2

## 2019-09-27 DIAGNOSIS — I10 ESSENTIAL HYPERTENSION: ICD-10-CM

## 2019-09-27 DIAGNOSIS — F41.9 ANXIETY: ICD-10-CM

## 2019-09-27 DIAGNOSIS — G89.21 CHRONIC PAIN DUE TO INJURY: ICD-10-CM

## 2019-09-27 DIAGNOSIS — I73.9 PVD (PERIPHERAL VASCULAR DISEASE): ICD-10-CM

## 2019-09-27 DIAGNOSIS — M62.838 MUSCLE SPASMS OF BOTH LOWER EXTREMITIES: Primary | ICD-10-CM

## 2019-09-27 DIAGNOSIS — S38.1XXA CRUSHING INJURY OF PELVIC REGION: ICD-10-CM

## 2019-09-27 DIAGNOSIS — R26.81 UNSTEADY GAIT: ICD-10-CM

## 2019-09-27 DIAGNOSIS — D68.9 CLOTTING DISORDER: ICD-10-CM

## 2019-09-27 DIAGNOSIS — R60.0 BILATERAL LOWER EXTREMITY EDEMA: ICD-10-CM

## 2019-09-27 PROCEDURE — 99214 OFFICE O/P EST MOD 30 MIN: CPT | Mod: S$GLB,,, | Performed by: NURSE PRACTITIONER

## 2019-09-27 PROCEDURE — 99214 PR OFFICE/OUTPT VISIT, EST, LEVL IV, 30-39 MIN: ICD-10-PCS | Mod: S$GLB,,, | Performed by: NURSE PRACTITIONER

## 2019-09-27 RX ORDER — LISINOPRIL 20 MG/1
20 TABLET ORAL DAILY
Qty: 90 TABLET | Refills: 1 | Status: SHIPPED | OUTPATIENT
Start: 2019-09-27 | End: 2020-01-03 | Stop reason: ALTCHOICE

## 2019-09-27 RX ORDER — FLUOXETINE HYDROCHLORIDE 20 MG/1
20 CAPSULE ORAL DAILY
Qty: 90 CAPSULE | Refills: 2 | Status: SHIPPED | OUTPATIENT
Start: 2019-09-27 | End: 2020-01-03 | Stop reason: SDUPTHER
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
## 2019-09-30 ENCOUNTER — TELEPHONE (OUTPATIENT)
Dept: PSYCHIATRY | Facility: CLINIC | Age: 44
End: 2019-09-30

## 2019-09-30 NOTE — TELEPHONE ENCOUNTER
Pt left vm stating that she was referred to Dr Sommer by [facility] due to provider there not accepting New Patients. I contacted pt to offer NP Appt, office number and direct extension provided for pt to call back for scheduling.

## 2019-10-01 ENCOUNTER — TELEPHONE (OUTPATIENT)
Dept: FAMILY MEDICINE | Facility: CLINIC | Age: 44
End: 2019-10-01

## 2019-10-04 RX ORDER — BUTALBITAL, ACETAMINOPHEN AND CAFFEINE 50; 325; 40 MG/1; MG/1; MG/1
TABLET ORAL
Qty: 60 TABLET | Refills: 2 | Status: SHIPPED | OUTPATIENT
Start: 2019-10-04 | End: 2019-11-12 | Stop reason: SDUPTHER

## 2019-10-04 RX ORDER — BUTALBITAL, ACETAMINOPHEN AND CAFFEINE 50; 325; 40 MG/1; MG/1; MG/1
1 TABLET ORAL EVERY 4 HOURS PRN
Qty: 60 TABLET | Refills: 2 | Status: SHIPPED | OUTPATIENT
Start: 2019-10-04 | End: 2020-01-02 | Stop reason: SDUPTHER

## 2019-10-22 ENCOUNTER — TELEPHONE (OUTPATIENT)
Dept: FAMILY MEDICINE | Facility: CLINIC | Age: 44
End: 2019-10-22

## 2019-10-22 NOTE — TELEPHONE ENCOUNTER
----- Message from Mily Robles sent at 10/22/2019  4:20 PM CDT -----  Contact: Natalya daily  Type: Needs Medical Advice    Who Called:  Jamila Amezcua Call Back Number: [phone]  Client id# [id number]  Additional Information: Pls call Derek regarding paperwork for pt chair.

## 2019-10-23 RX ORDER — DIAZEPAM 10 MG/1
10 TABLET ORAL 2 TIMES DAILY
Qty: 60 TABLET | Refills: 0 | Status: CANCELLED | OUTPATIENT
Start: 2019-10-23

## 2019-10-24 ENCOUNTER — PATIENT MESSAGE (OUTPATIENT)
Dept: FAMILY MEDICINE | Facility: CLINIC | Age: 44
End: 2019-10-24

## 2019-10-24 RX ORDER — DIAZEPAM 10 MG/1
TABLET ORAL
Qty: 60 TABLET | Refills: 0 | Status: SHIPPED | OUTPATIENT
Start: 2019-10-24 | End: 2019-11-21 | Stop reason: SDUPTHER

## 2019-10-24 RX ORDER — TEMAZEPAM 30 MG/1
30 CAPSULE ORAL NIGHTLY PRN
Qty: 30 CAPSULE | Refills: 0 | Status: SHIPPED | OUTPATIENT
Start: 2019-10-24 | End: 2019-11-21 | Stop reason: SDUPTHER

## 2019-10-24 RX ORDER — TEMAZEPAM 30 MG/1
30 CAPSULE ORAL NIGHTLY PRN
Qty: 30 CAPSULE | Refills: 0 | OUTPATIENT
Start: 2019-10-24

## 2019-10-24 RX ORDER — DIAZEPAM 10 MG/1
TABLET ORAL
Qty: 60 TABLET | Refills: 0 | Status: CANCELLED | OUTPATIENT
Start: 2019-10-24

## 2019-10-28 ENCOUNTER — TELEPHONE (OUTPATIENT)
Dept: FAMILY MEDICINE | Facility: CLINIC | Age: 44
End: 2019-10-28

## 2019-10-28 NOTE — TELEPHONE ENCOUNTER
----- Message from Saundra Wyatt sent at 10/28/2019  4:07 PM CDT -----  Contact: Marii Colvin from [facility] chairs called regarding paperwork faxed over to finalize the order for patient.   Please check if fax was received and fax back with signature, and please call at [phone] ref#[id number]

## 2019-10-28 NOTE — TELEPHONE ENCOUNTER
Paperwork completed by Germaine and faxed.  Called [facility] at number provided and was on hold for over 5 minutes and no one picked up.

## 2019-10-29 ENCOUNTER — TELEPHONE (OUTPATIENT)
Dept: FAMILY MEDICINE | Facility: CLINIC | Age: 44
End: 2019-10-29

## 2019-10-29 NOTE — TELEPHONE ENCOUNTER
----- Message from Jose Juan Alvarez sent at 10/29/2019  1:26 PM CDT -----  Type: Needs Medical Advice    Who Called:  Danuta    Best Call Back Number: [phone]--Ref# [id number]  Additional Information: Caller states that she would like a callback regarding the status of a fax that was sent on 10/28.

## 2019-10-29 NOTE — TELEPHONE ENCOUNTER
ALL paperwork was faxed yesterday before April Left. Attempted to return [facility]'s call, but was put on hold for 10 minutes.

## 2019-10-30 ENCOUNTER — TELEPHONE (OUTPATIENT)
Dept: FAMILY MEDICINE | Facility: CLINIC | Age: 44
End: 2019-10-30

## 2019-10-30 NOTE — TELEPHONE ENCOUNTER
----- Message from Paul Nance sent at 10/30/2019  2:35 PM CDT -----  Contact: Amara Bonilla Around  Name of Who is Calling:     What is the request in detail: calling in regards to the attestion form that was faxed over with all other forms yet, this particular form (attestion) was not sent back. Please fax to: [phone]. Please contact to further discuss and advise      Can the clinic reply by [facility]: no    [facility] [phone] ref #[id number]

## 2019-10-30 NOTE — TELEPHONE ENCOUNTER
Please see message from yesterday, all forms were sent 32 days ago. Unable to contact anyone at [facility] due to being placed on hold.

## 2019-10-31 ENCOUNTER — TELEPHONE (OUTPATIENT)
Dept: FAMILY MEDICINE | Facility: CLINIC | Age: 44
End: 2019-10-31

## 2019-10-31 NOTE — TELEPHONE ENCOUNTER
Form has been faxed numerous times and we are unable to speak to anyone when we return call to at [facility].

## 2019-10-31 NOTE — TELEPHONE ENCOUNTER
----- Message from Kiersten Baig sent at 10/31/2019  4:23 PM CDT -----  Contact: wheelchair company/[employer]  Type: Needs Medical Advice    Who Called:  [employer]  Symptoms (please be specific):na  How long has patient had these symptoms:  saad  Pharmacy name and phone #:  saad  Best Call Back Number: [phone] Ref#[id number]  Additional Information: [employer] checking status of fax requiring derik to sign and date.  Please call to advise. Thanks!

## 2019-11-01 ENCOUNTER — TELEPHONE (OUTPATIENT)
Dept: FAMILY MEDICINE | Facility: CLINIC | Age: 44
End: 2019-11-01

## 2019-11-01 NOTE — TELEPHONE ENCOUNTER
----- Message from Ankita Wong sent at 11/1/2019 11:43 AM CDT -----  Type: Needs Medical Advice    Who Called:  roberto Maloney   Symptoms (please be specific):  NA   How long has patient had these symptoms:    Pharmacy name and phone #:    Best Call Back Number: [phone]   Additional Information: the office faxed paper work for power wheel chair. Asking for an update for the paper work

## 2019-11-12 DIAGNOSIS — G43.719 INTRACTABLE CHRONIC MIGRAINE WITHOUT AURA AND WITHOUT STATUS MIGRAINOSUS: Primary | ICD-10-CM

## 2019-11-12 RX ORDER — BUTALBITAL, ACETAMINOPHEN AND CAFFEINE 50; 325; 40 MG/1; MG/1; MG/1
1 TABLET ORAL EVERY 4 HOURS PRN
Qty: 60 TABLET | Refills: 2 | Status: SHIPPED | OUTPATIENT
Start: 2019-11-12 | End: 2019-12-31

## 2019-11-21 RX ORDER — DIAZEPAM 10 MG/1
TABLET ORAL
Qty: 60 TABLET | Refills: 0 | Status: SHIPPED | OUTPATIENT
Start: 2019-11-21 | End: 2020-07-26 | Stop reason: SDUPTHER

## 2019-11-21 RX ORDER — TEMAZEPAM 30 MG/1
30 CAPSULE ORAL NIGHTLY PRN
Qty: 30 CAPSULE | Refills: 0 | Status: SHIPPED | OUTPATIENT
Start: 2019-11-21 | End: 2020-07-26 | Stop reason: SDUPTHER

## 2019-11-21 RX ORDER — RIZATRIPTAN BENZOATE 10 MG/1
TABLET, ORALLY DISINTEGRATING ORAL
Qty: 6 TABLET | Refills: 6 | Status: SHIPPED | OUTPATIENT
Start: 2019-11-21 | End: 2020-01-02 | Stop reason: SDUPTHER

## 2019-11-26 ENCOUNTER — TELEPHONE (OUTPATIENT)
Dept: FAMILY MEDICINE | Facility: CLINIC | Age: 44
End: 2019-11-26

## 2019-11-26 NOTE — TELEPHONE ENCOUNTER
----- Message from Vaishnavi Cleaning sent at 11/26/2019 11:27 AM CST -----  Contact: pt  Pt calling at 11:26 am states that she is sorry woke up sick,tonsils are swollen,cold,flu not sure what it is,fever. Don't want to come in this way..[phone] (home)

## 2019-12-16 DIAGNOSIS — E78.5 HYPERLIPIDEMIA, UNSPECIFIED HYPERLIPIDEMIA TYPE: Primary | ICD-10-CM

## 2019-12-16 RX ORDER — ATORVASTATIN CALCIUM 10 MG/1
10 TABLET, FILM COATED ORAL DAILY
Qty: 90 TABLET | Refills: 3 | Status: SHIPPED | OUTPATIENT
Start: 2019-12-16 | End: 2020-12-15

## 2019-12-30 DIAGNOSIS — G43.719 INTRACTABLE CHRONIC MIGRAINE WITHOUT AURA AND WITHOUT STATUS MIGRAINOSUS: ICD-10-CM

## 2019-12-31 RX ORDER — LEVOTHYROXINE SODIUM 175 UG/1
175 TABLET ORAL DAILY
Qty: 90 TABLET | Refills: 1 | Status: SHIPPED | OUTPATIENT
Start: 2019-12-31 | End: 2020-11-09

## 2019-12-31 RX ORDER — BUTALBITAL, ACETAMINOPHEN AND CAFFEINE 50; 325; 40 MG/1; MG/1; MG/1
TABLET ORAL
Qty: 60 TABLET | Refills: 2 | Status: SHIPPED | OUTPATIENT
Start: 2019-12-31 | End: 2020-01-02 | Stop reason: SDUPTHER

## 2019-12-31 RX ORDER — LIOTHYRONINE SODIUM 25 UG/1
25 TABLET ORAL DAILY
Qty: 90 TABLET | Refills: 2 | Status: SHIPPED | OUTPATIENT
Start: 2019-12-31 | End: 2020-11-18 | Stop reason: ALTCHOICE

## 2019-12-31 RX ORDER — BUTALBITAL, ACETAMINOPHEN AND CAFFEINE 50; 325; 40 MG/1; MG/1; MG/1
1 TABLET ORAL EVERY 4 HOURS PRN
Qty: 60 TABLET | Refills: 2 | OUTPATIENT
Start: 2019-12-31

## 2020-01-01 ENCOUNTER — PATIENT MESSAGE (OUTPATIENT)
Dept: FAMILY MEDICINE | Facility: CLINIC | Age: 45
End: 2020-01-01

## 2020-01-02 DIAGNOSIS — G43.719 INTRACTABLE CHRONIC MIGRAINE WITHOUT AURA AND WITHOUT STATUS MIGRAINOSUS: ICD-10-CM

## 2020-01-02 RX ORDER — BUTALBITAL, ACETAMINOPHEN AND CAFFEINE 50; 325; 40 MG/1; MG/1; MG/1
1 TABLET ORAL EVERY 4 HOURS PRN
Qty: 60 TABLET | Refills: 2 | Status: SHIPPED | OUTPATIENT
Start: 2020-01-02 | End: 2020-02-06

## 2020-01-03 ENCOUNTER — OFFICE VISIT (OUTPATIENT)
Dept: FAMILY MEDICINE | Facility: CLINIC | Age: 45
End: 2020-01-03
Payer: MEDICARE

## 2020-01-03 VITALS
OXYGEN SATURATION: 97 % | SYSTOLIC BLOOD PRESSURE: 134 MMHG | HEART RATE: 72 BPM | DIASTOLIC BLOOD PRESSURE: 86 MMHG | HEIGHT: 64 IN | WEIGHT: 227.06 LBS | BODY MASS INDEX: 38.76 KG/M2 | TEMPERATURE: 98 F

## 2020-01-03 DIAGNOSIS — I10 ESSENTIAL HYPERTENSION: ICD-10-CM

## 2020-01-03 DIAGNOSIS — F41.9 ANXIETY: ICD-10-CM

## 2020-01-03 DIAGNOSIS — M62.838 MUSCLE SPASMS OF BOTH LOWER EXTREMITIES: ICD-10-CM

## 2020-01-03 DIAGNOSIS — M62.838 TRAPEZIUS MUSCLE SPASM: ICD-10-CM

## 2020-01-03 DIAGNOSIS — E03.4 HYPOTHYROIDISM DUE TO ACQUIRED ATROPHY OF THYROID: Primary | ICD-10-CM

## 2020-01-03 DIAGNOSIS — J30.9 ALLERGIC RHINITIS, UNSPECIFIED SEASONALITY, UNSPECIFIED TRIGGER: ICD-10-CM

## 2020-01-03 PROCEDURE — 80053 COMPREHEN METABOLIC PANEL: CPT

## 2020-01-03 PROCEDURE — 84480 ASSAY TRIIODOTHYRONINE (T3): CPT

## 2020-01-03 PROCEDURE — 84436 ASSAY OF TOTAL THYROXINE: CPT

## 2020-01-03 PROCEDURE — 99214 OFFICE O/P EST MOD 30 MIN: CPT | Mod: S$GLB,,, | Performed by: NURSE PRACTITIONER

## 2020-01-03 PROCEDURE — 84443 ASSAY THYROID STIM HORMONE: CPT

## 2020-01-03 PROCEDURE — 99214 PR OFFICE/OUTPT VISIT, EST, LEVL IV, 30-39 MIN: ICD-10-PCS | Mod: S$GLB,,, | Performed by: NURSE PRACTITIONER

## 2020-01-03 PROCEDURE — 85025 COMPLETE CBC W/AUTO DIFF WBC: CPT

## 2020-01-03 RX ORDER — LAMOTRIGINE 25 MG/1
2 TABLET ORAL NIGHTLY
COMMUNITY
Start: 2019-12-30

## 2020-01-03 RX ORDER — BACLOFEN 10 MG/1
1 TABLET ORAL NIGHTLY
COMMUNITY
Start: 2019-12-30

## 2020-01-03 RX ORDER — PRAZOSIN HYDROCHLORIDE 2 MG/1
2 CAPSULE ORAL NIGHTLY
COMMUNITY
Start: 2019-12-30 | End: 2020-07-20 | Stop reason: SDUPTHER

## 2020-01-03 RX ORDER — FLUOXETINE HYDROCHLORIDE 20 MG/1
20 CAPSULE ORAL 2 TIMES DAILY
Qty: 180 CAPSULE | Refills: 2
Start: 2020-01-03 | End: 2020-09-29 | Stop reason: SDUPTHER

## 2020-01-03 RX ORDER — PROPRANOLOL HYDROCHLORIDE 10 MG/1
1 TABLET ORAL 2 TIMES DAILY
COMMUNITY
Start: 2019-12-30 | End: 2020-07-20 | Stop reason: SDUPTHER
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
## 2020-01-03 NOTE — PROGRESS NOTES
Venipuncture performed with 23 gauge butterfly, x's 1 attempt,  to left hand vein.  Specimens collected per orders.      Pressure dressing applied to site, instructed patient to remove dressing in 10-15 minutes, OK to re-adjust dressing if pressure causing any discomfort, to observe closely for numbness and/or discoloration to hand or fingers, and to notify provider if bleeding persists after applying constant pressure lasting 30 minutes.

## 2020-01-04 LAB
ALBUMIN SERPL BCP-MCNC: 3.4 G/DL (ref 3.5–5.2)
ALP SERPL-CCNC: 168 U/L (ref 55–135)
ALT SERPL W/O P-5'-P-CCNC: 114 U/L (ref 10–44)
ANION GAP SERPL CALC-SCNC: 7 MMOL/L (ref 8–16)
AST SERPL-CCNC: 63 U/L (ref 10–40)
BASOPHILS # BLD AUTO: 0.04 K/UL (ref 0–0.2)
BASOPHILS NFR BLD: 0.6 % (ref 0–1.9)
BILIRUB SERPL-MCNC: 0.3 MG/DL (ref 0.1–1)
BUN SERPL-MCNC: 9 MG/DL (ref 6–20)
CALCIUM SERPL-MCNC: 9.4 MG/DL (ref 8.7–10.5)
CHLORIDE SERPL-SCNC: 105 MMOL/L (ref 95–110)
CO2 SERPL-SCNC: 26 MMOL/L (ref 23–29)
CREAT SERPL-MCNC: 0.7 MG/DL (ref 0.5–1.4)
DIFFERENTIAL METHOD: ABNORMAL
EOSINOPHIL # BLD AUTO: 0.2 K/UL (ref 0–0.5)
EOSINOPHIL NFR BLD: 2.4 % (ref 0–8)
ERYTHROCYTE [DISTWIDTH] IN BLOOD BY AUTOMATED COUNT: 12.5 % (ref 11.5–14.5)
EST. GFR  (AFRICAN AMERICAN): >60 ML/MIN/1.73 M^2
EST. GFR  (NON AFRICAN AMERICAN): >60 ML/MIN/1.73 M^2
GLUCOSE SERPL-MCNC: 86 MG/DL (ref 70–110)
HCT VFR BLD AUTO: 34.3 % (ref 37–48.5)
HGB BLD-MCNC: 11.1 G/DL (ref 12–16)
IMM GRANULOCYTES # BLD AUTO: 0.01 K/UL (ref 0–0.04)
IMM GRANULOCYTES NFR BLD AUTO: 0.2 % (ref 0–0.5)
LYMPHOCYTES # BLD AUTO: 3.3 K/UL (ref 1–4.8)
LYMPHOCYTES NFR BLD: 50.2 % (ref 18–48)
MCH RBC QN AUTO: 28.8 PG (ref 27–31)
MCHC RBC AUTO-ENTMCNC: 32.4 G/DL (ref 32–36)
MCV RBC AUTO: 89 FL (ref 82–98)
MONOCYTES # BLD AUTO: 0.5 K/UL (ref 0.3–1)
MONOCYTES NFR BLD: 6.9 % (ref 4–15)
NEUTROPHILS # BLD AUTO: 2.6 K/UL (ref 1.8–7.7)
NEUTROPHILS NFR BLD: 39.7 % (ref 38–73)
NRBC BLD-RTO: 0 /100 WBC
PLATELET # BLD AUTO: 259 K/UL (ref 150–350)
PMV BLD AUTO: 12.1 FL (ref 9.2–12.9)
POTASSIUM SERPL-SCNC: 4.4 MMOL/L (ref 3.5–5.1)
PROT SERPL-MCNC: 6.5 G/DL (ref 6–8.4)
RBC # BLD AUTO: 3.85 M/UL (ref 4–5.4)
SODIUM SERPL-SCNC: 138 MMOL/L (ref 136–145)
T3 SERPL-MCNC: 174 NG/DL (ref 60–180)
T4 SERPL-MCNC: 6.7 UG/DL (ref 4.5–11.5)
TSH SERPL DL<=0.005 MIU/L-ACNC: 0.81 UIU/ML (ref 0.4–4)
WBC # BLD AUTO: 6.54 K/UL (ref 3.9–12.7)

## 2020-01-06 DIAGNOSIS — R74.8 ELEVATED LIVER ENZYMES: Primary | ICD-10-CM

## 2020-01-06 DIAGNOSIS — D64.9 ANEMIA, UNSPECIFIED TYPE: ICD-10-CM

## 2020-01-09 ENCOUNTER — PATIENT MESSAGE (OUTPATIENT)
Dept: FAMILY MEDICINE | Facility: CLINIC | Age: 45
End: 2020-01-09

## 2020-01-23 RX ORDER — IBUPROFEN 600 MG/1
TABLET ORAL
Qty: 60 TABLET | Refills: 3 | Status: SHIPPED | OUTPATIENT
Start: 2020-01-23 | End: 2020-04-24

## 2020-02-06 DIAGNOSIS — G43.719 INTRACTABLE CHRONIC MIGRAINE WITHOUT AURA AND WITHOUT STATUS MIGRAINOSUS: ICD-10-CM

## 2020-02-06 RX ORDER — BUTALBITAL, ACETAMINOPHEN AND CAFFEINE 50; 325; 40 MG/1; MG/1; MG/1
TABLET ORAL
Qty: 60 TABLET | Refills: 2 | Status: SHIPPED | OUTPATIENT
Start: 2020-02-06 | End: 2020-03-12

## 2020-02-24 RX ORDER — RIZATRIPTAN BENZOATE 10 MG/1
TABLET, ORALLY DISINTEGRATING ORAL
Qty: 6 TABLET | Refills: 6 | Status: SHIPPED | OUTPATIENT
Start: 2020-02-24

## 2020-03-11 ENCOUNTER — PATIENT MESSAGE (OUTPATIENT)
Dept: FAMILY MEDICINE | Facility: CLINIC | Age: 45
End: 2020-03-11

## 2020-03-11 DIAGNOSIS — G43.719 INTRACTABLE CHRONIC MIGRAINE WITHOUT AURA AND WITHOUT STATUS MIGRAINOSUS: ICD-10-CM

## 2020-03-12 ENCOUNTER — PATIENT MESSAGE (OUTPATIENT)
Dept: FAMILY MEDICINE | Facility: CLINIC | Age: 45
End: 2020-03-12

## 2020-03-12 DIAGNOSIS — G43.719 INTRACTABLE CHRONIC MIGRAINE WITHOUT AURA AND WITHOUT STATUS MIGRAINOSUS: ICD-10-CM

## 2020-03-12 RX ORDER — BUTALBITAL, ACETAMINOPHEN AND CAFFEINE 50; 325; 40 MG/1; MG/1; MG/1
1 TABLET ORAL EVERY 4 HOURS PRN
Qty: 60 TABLET | Refills: 2 | Status: SHIPPED | OUTPATIENT
Start: 2020-03-12 | End: 2020-04-09 | Stop reason: SDUPTHER

## 2020-03-12 RX ORDER — BUTALBITAL, ACETAMINOPHEN AND CAFFEINE 50; 325; 40 MG/1; MG/1; MG/1
TABLET ORAL
Qty: 60 TABLET | Refills: 2 | Status: SHIPPED | OUTPATIENT
Start: 2020-03-12 | End: 2020-03-12 | Stop reason: SDUPTHER

## 2020-03-12 RX ORDER — BUTALBITAL, ACETAMINOPHEN AND CAFFEINE 50; 325; 40 MG/1; MG/1; MG/1
1 TABLET ORAL EVERY 4 HOURS PRN
Qty: 60 TABLET | Refills: 2 | Status: CANCELLED | OUTPATIENT
Start: 2020-03-12

## 2020-03-12 NOTE — TELEPHONE ENCOUNTER
----- Message from Nilson CONNOR Lluvia sent at 3/12/2020 12:36 PM CDT -----  Contact: same  Patient called in and stated she is at the pharmacy and they are telling her they have not received her Rx for butalbital-acetaminophen-caffeine -40 mg (FIORICET, ESGIC) -40 mg per tablet, 60 tablets with 2 refills last filled on 3/12/2020  No  Sig - Route: Take 1 tablet by mouth every 4 (four) hours as needed. - Oral      [facility] - [place], [place] - [address]  [address] [place] [id number]  Phone: [phone] Fax: [phone]    Patient call back number is [phone]

## 2020-03-17 ENCOUNTER — PATIENT MESSAGE (OUTPATIENT)
Dept: FAMILY MEDICINE | Facility: CLINIC | Age: 45
End: 2020-03-17

## 2020-03-17 DIAGNOSIS — E03.4 HYPOTHYROIDISM DUE TO ACQUIRED ATROPHY OF THYROID: Primary | ICD-10-CM

## 2020-03-17 DIAGNOSIS — N95.9 MENOPAUSAL DISORDER: ICD-10-CM

## 2020-03-17 DIAGNOSIS — D51.8 OTHER VITAMIN B12 DEFICIENCY ANEMIAS: ICD-10-CM

## 2020-03-17 DIAGNOSIS — E78.5 HYPERLIPIDEMIA, UNSPECIFIED HYPERLIPIDEMIA TYPE: ICD-10-CM

## 2020-03-17 DIAGNOSIS — D64.9 ANEMIA, UNSPECIFIED TYPE: ICD-10-CM

## 2020-03-17 DIAGNOSIS — Z79.891 LONG TERM (CURRENT) USE OF OPIATE ANALGESIC: ICD-10-CM

## 2020-03-17 DIAGNOSIS — R74.8 ELEVATED LIVER ENZYMES: ICD-10-CM

## 2020-04-09 ENCOUNTER — PATIENT MESSAGE (OUTPATIENT)
Dept: FAMILY MEDICINE | Facility: CLINIC | Age: 45
End: 2020-04-09

## 2020-04-09 DIAGNOSIS — G43.719 INTRACTABLE CHRONIC MIGRAINE WITHOUT AURA AND WITHOUT STATUS MIGRAINOSUS: ICD-10-CM

## 2020-04-09 RX ORDER — KETOCONAZOLE 20 MG/ML
SHAMPOO, SUSPENSION TOPICAL
Qty: 120 ML | Refills: 3 | Status: SHIPPED | OUTPATIENT
Start: 2020-04-09 | End: 2020-07-20

## 2020-04-09 RX ORDER — BUTALBITAL, ACETAMINOPHEN AND CAFFEINE 50; 325; 40 MG/1; MG/1; MG/1
1 TABLET ORAL EVERY 4 HOURS PRN
Qty: 60 TABLET | Refills: 2 | Status: SHIPPED | OUTPATIENT
Start: 2020-04-09 | End: 2020-04-09 | Stop reason: SDUPTHER

## 2020-04-09 RX ORDER — BUTALBITAL, ACETAMINOPHEN AND CAFFEINE 50; 325; 40 MG/1; MG/1; MG/1
1 TABLET ORAL EVERY 4 HOURS PRN
Qty: 60 TABLET | Refills: 2 | Status: SHIPPED | OUTPATIENT
Start: 2020-04-09 | End: 2020-05-11 | Stop reason: SDUPTHER

## 2020-04-24 RX ORDER — IBUPROFEN 600 MG/1
TABLET ORAL
Qty: 60 TABLET | Refills: 3 | Status: SHIPPED | OUTPATIENT
Start: 2020-04-24 | End: 2020-07-10 | Stop reason: SDUPTHER

## 2020-05-05 ENCOUNTER — PATIENT MESSAGE (OUTPATIENT)
Dept: ADMINISTRATIVE | Facility: HOSPITAL | Age: 45
End: 2020-05-05

## 2020-05-11 DIAGNOSIS — G43.719 INTRACTABLE CHRONIC MIGRAINE WITHOUT AURA AND WITHOUT STATUS MIGRAINOSUS: ICD-10-CM

## 2020-05-11 RX ORDER — BUTALBITAL, ACETAMINOPHEN AND CAFFEINE 50; 325; 40 MG/1; MG/1; MG/1
TABLET ORAL
Qty: 60 TABLET | Refills: 2 | Status: SHIPPED | OUTPATIENT
Start: 2020-05-11 | End: 2020-07-10 | Stop reason: SDUPTHER

## 2020-05-11 RX ORDER — BUTALBITAL, ACETAMINOPHEN AND CAFFEINE 50; 325; 40 MG/1; MG/1; MG/1
1 TABLET ORAL EVERY 4 HOURS PRN
Qty: 60 TABLET | Refills: 2 | Status: SHIPPED | OUTPATIENT
Start: 2020-05-11 | End: 2020-06-05 | Stop reason: SDUPTHER

## 2020-05-19 RX ORDER — FUROSEMIDE 20 MG/1
TABLET ORAL
Qty: 30 TABLET | Refills: 4 | Status: SHIPPED | OUTPATIENT
Start: 2020-05-19 | End: 2020-09-21

## 2020-07-10 ENCOUNTER — PATIENT MESSAGE (OUTPATIENT)
Dept: FAMILY MEDICINE | Facility: CLINIC | Age: 45
End: 2020-07-10

## 2020-07-10 DIAGNOSIS — G43.719 INTRACTABLE CHRONIC MIGRAINE WITHOUT AURA AND WITHOUT STATUS MIGRAINOSUS: ICD-10-CM

## 2020-07-12 RX ORDER — BUTALBITAL, ACETAMINOPHEN AND CAFFEINE 50; 325; 40 MG/1; MG/1; MG/1
1 TABLET ORAL EVERY 4 HOURS PRN
Qty: 60 TABLET | Refills: 2 | Status: SHIPPED | OUTPATIENT
Start: 2020-07-12 | End: 2020-09-08

## 2020-07-12 RX ORDER — IBUPROFEN 600 MG/1
600 TABLET ORAL 3 TIMES DAILY
Qty: 60 TABLET | Refills: 3 | Status: SHIPPED | OUTPATIENT
Start: 2020-07-12 | End: 2020-10-12

## 2020-07-20 ENCOUNTER — OFFICE VISIT (OUTPATIENT)
Dept: FAMILY MEDICINE | Facility: CLINIC | Age: 45
End: 2020-07-20
Payer: MEDICARE

## 2020-07-20 DIAGNOSIS — Z12.31 ENCOUNTER FOR SCREENING MAMMOGRAM FOR MALIGNANT NEOPLASM OF BREAST: ICD-10-CM

## 2020-07-20 DIAGNOSIS — E03.4 HYPOTHYROIDISM DUE TO ACQUIRED ATROPHY OF THYROID: ICD-10-CM

## 2020-07-20 DIAGNOSIS — Z12.39 BREAST CANCER SCREENING: ICD-10-CM

## 2020-07-20 DIAGNOSIS — Z11.4 SCREENING FOR HIV WITHOUT PRESENCE OF RISK FACTORS: ICD-10-CM

## 2020-07-20 DIAGNOSIS — F51.01 PRIMARY INSOMNIA: ICD-10-CM

## 2020-07-20 DIAGNOSIS — E21.0 PRIMARY HYPERPARATHYROIDISM: ICD-10-CM

## 2020-07-20 DIAGNOSIS — I10 ESSENTIAL HYPERTENSION: Primary | ICD-10-CM

## 2020-07-20 DIAGNOSIS — I73.9 PVD (PERIPHERAL VASCULAR DISEASE): ICD-10-CM

## 2020-07-20 DIAGNOSIS — F41.9 ANXIETY: ICD-10-CM

## 2020-07-20 DIAGNOSIS — E78.2 MIXED HYPERLIPIDEMIA: ICD-10-CM

## 2020-07-20 DIAGNOSIS — G89.21 CHRONIC PAIN DUE TO INJURY: ICD-10-CM

## 2020-07-20 PROBLEM — E66.01 MORBID OBESITY: Status: RESOLVED | Noted: 2018-05-31 | Resolved: 2020-07-20

## 2020-07-20 PROBLEM — T50.902A INTENTIONAL OVERDOSE OF DRUG IN TABLET FORM: Status: ACTIVE | Noted: 2020-07-20

## 2020-07-20 PROCEDURE — 99214 OFFICE O/P EST MOD 30 MIN: CPT | Mod: S$GLB,,, | Performed by: NURSE PRACTITIONER

## 2020-07-20 PROCEDURE — 3077F SYST BP >= 140 MM HG: CPT | Mod: CPTII,S$GLB,, | Performed by: NURSE PRACTITIONER

## 2020-07-20 PROCEDURE — 99499 UNLISTED E&M SERVICE: CPT | Mod: S$GLB,,, | Performed by: NURSE PRACTITIONER

## 2020-07-20 PROCEDURE — 3008F BODY MASS INDEX DOCD: CPT | Mod: CPTII,S$GLB,, | Performed by: NURSE PRACTITIONER

## 2020-07-20 PROCEDURE — 99499 RISK ADDL DX/OHS AUDIT: ICD-10-PCS | Mod: S$GLB,,, | Performed by: NURSE PRACTITIONER

## 2020-07-20 PROCEDURE — 3008F PR BODY MASS INDEX (BMI) DOCUMENTED: ICD-10-PCS | Mod: CPTII,S$GLB,, | Performed by: NURSE PRACTITIONER

## 2020-07-20 PROCEDURE — 99214 PR OFFICE/OUTPT VISIT, EST, LEVL IV, 30-39 MIN: ICD-10-PCS | Mod: S$GLB,,, | Performed by: NURSE PRACTITIONER

## 2020-07-20 PROCEDURE — 3079F PR MOST RECENT DIASTOLIC BLOOD PRESSURE 80-89 MM HG: ICD-10-PCS | Mod: CPTII,S$GLB,, | Performed by: NURSE PRACTITIONER

## 2020-07-20 PROCEDURE — 3079F DIAST BP 80-89 MM HG: CPT | Mod: CPTII,S$GLB,, | Performed by: NURSE PRACTITIONER

## 2020-07-20 PROCEDURE — 3077F PR MOST RECENT SYSTOLIC BLOOD PRESSURE >= 140 MM HG: ICD-10-PCS | Mod: CPTII,S$GLB,, | Performed by: NURSE PRACTITIONER

## 2020-07-20 RX ORDER — MIRTAZAPINE 30 MG/1
30 TABLET, FILM COATED ORAL NIGHTLY
Qty: 90 TABLET | Refills: 1 | Status: SHIPPED | OUTPATIENT
Start: 2020-07-20 | End: 2020-09-25 | Stop reason: SDUPTHER

## 2020-07-20 RX ORDER — PRAZOSIN HYDROCHLORIDE 2 MG/1
4 CAPSULE ORAL NIGHTLY
Qty: 90 CAPSULE | Refills: 1 | Status: SHIPPED | OUTPATIENT
Start: 2020-07-20 | End: 2020-09-02

## 2020-07-20 RX ORDER — PROPRANOLOL HYDROCHLORIDE 10 MG/1
10 TABLET ORAL 2 TIMES DAILY
Qty: 90 TABLET | Refills: 1 | Status: SHIPPED | OUTPATIENT
Start: 2020-07-20 | End: 2020-09-02
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
## 2020-07-21 VITALS
HEIGHT: 64 IN | WEIGHT: 177.38 LBS | HEART RATE: 101 BPM | TEMPERATURE: 98 F | DIASTOLIC BLOOD PRESSURE: 89 MMHG | RESPIRATION RATE: 18 BRPM | BODY MASS INDEX: 30.28 KG/M2 | OXYGEN SATURATION: 97 % | SYSTOLIC BLOOD PRESSURE: 150 MMHG

## 2020-07-27 RX ORDER — DIAZEPAM 10 MG/1
TABLET ORAL
Qty: 60 TABLET | Refills: 0 | Status: SHIPPED | OUTPATIENT
Start: 2020-07-27 | End: 2020-08-26

## 2020-07-27 RX ORDER — TEMAZEPAM 30 MG/1
30 CAPSULE ORAL NIGHTLY PRN
Qty: 30 CAPSULE | Refills: 0 | Status: SHIPPED | OUTPATIENT
Start: 2020-07-27 | End: 2020-08-26

## 2020-08-07 DIAGNOSIS — G43.719 INTRACTABLE CHRONIC MIGRAINE WITHOUT AURA AND WITHOUT STATUS MIGRAINOSUS: ICD-10-CM

## 2020-08-07 RX ORDER — BUTALBITAL, ACETAMINOPHEN AND CAFFEINE 50; 325; 40 MG/1; MG/1; MG/1
1 TABLET ORAL EVERY 4 HOURS PRN
Qty: 60 TABLET | Refills: 2 | Status: CANCELLED | OUTPATIENT
Start: 2020-08-07

## 2020-09-25 ENCOUNTER — PATIENT MESSAGE (OUTPATIENT)
Dept: FAMILY MEDICINE | Facility: CLINIC | Age: 45
End: 2020-09-25

## 2020-09-25 RX ORDER — PROPRANOLOL HYDROCHLORIDE 10 MG/1
10 TABLET ORAL 2 TIMES DAILY
Qty: 90 TABLET | Refills: 1 | Status: SHIPPED | OUTPATIENT
Start: 2020-09-25 | End: 2020-11-19

## 2020-09-25 RX ORDER — MIRTAZAPINE 30 MG/1
30 TABLET, FILM COATED ORAL NIGHTLY
Qty: 90 TABLET | Refills: 1 | Status: SHIPPED | OUTPATIENT
Start: 2020-09-25 | End: 2021-07-09

## 2020-09-25 RX ORDER — DIAZEPAM 10 MG/1
TABLET ORAL
Qty: 60 TABLET | Refills: 0 | Status: SHIPPED | OUTPATIENT
Start: 2020-09-25 | End: 2020-10-23 | Stop reason: SDUPTHER

## 2020-09-25 RX ORDER — TEMAZEPAM 30 MG/1
30 CAPSULE ORAL NIGHTLY PRN
Qty: 30 CAPSULE | Refills: 0 | Status: SHIPPED | OUTPATIENT
Start: 2020-09-25 | End: 2020-10-23 | Stop reason: SDUPTHER

## 2020-09-29 DIAGNOSIS — F41.9 ANXIETY: Primary | ICD-10-CM

## 2020-09-29 RX ORDER — FLUOXETINE HYDROCHLORIDE 20 MG/1
20 CAPSULE ORAL 2 TIMES DAILY
Qty: 180 CAPSULE | Refills: 2
Start: 2020-09-29 | End: 2020-11-13 | Stop reason: SDUPTHER

## 2020-10-23 ENCOUNTER — PATIENT MESSAGE (OUTPATIENT)
Dept: FAMILY MEDICINE | Facility: CLINIC | Age: 45
End: 2020-10-23

## 2020-10-23 RX ORDER — DIAZEPAM 10 MG/1
TABLET ORAL
Qty: 60 TABLET | Refills: 0 | Status: SHIPPED | OUTPATIENT
Start: 2020-10-23

## 2020-10-23 RX ORDER — TEMAZEPAM 30 MG/1
30 CAPSULE ORAL NIGHTLY PRN
Qty: 30 CAPSULE | Refills: 0 | Status: SHIPPED | OUTPATIENT
Start: 2020-10-23

## 2020-10-23 NOTE — TELEPHONE ENCOUNTER
I will refill these for her this one last time but new policy here in [facility], that is beyond my control, is that these medications can only be given #30 every 6 months.  She will have to see pain management or psychiatry/sleep medicine to keep getting these on a monthly basis.  I am so sorry.

## 2020-11-13 DIAGNOSIS — F41.9 ANXIETY: ICD-10-CM

## 2020-11-13 RX ORDER — FLUOXETINE HYDROCHLORIDE 20 MG/1
20 CAPSULE ORAL 2 TIMES DAILY
Qty: 180 CAPSULE | Refills: 2
Start: 2020-11-13 | End: 2020-11-19 | Stop reason: SDUPTHER
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
## 2020-11-13 NOTE — TELEPHONE ENCOUNTER
----- Message from Bushra Fry sent at 11/13/2020  2:01 PM CST -----  Type:  Patient Returning Call    Who Called:  Jamila  Who Left Message for Patient:  Adrienne  Does the patient know what this is regarding?:  med refill and psychiatrist  Best Call Back Number:  [phone]  Additional Information:  Thank you!

## 2020-11-13 NOTE — TELEPHONE ENCOUNTER
Called pt regarding below message. Advised pt of need to schedule per below. Advised pt of first available appt. Appt date, time, and location given. Pt verbalized understanding with no further questions.

## 2020-11-16 ENCOUNTER — TELEPHONE (OUTPATIENT)
Dept: OPHTHALMOLOGY | Facility: CLINIC | Age: 45
End: 2020-11-16

## 2020-11-16 NOTE — TELEPHONE ENCOUNTER
Attempted to contact pt at both numbers in chart.  Both phones had the following message:    Phone system message. Were sorry your call cannot be completed at this time.  Please hang up and try your call again later.  Thank you.  Phone system message/ech

## 2020-11-17 ENCOUNTER — PATIENT OUTREACH (OUTPATIENT)
Dept: ADMINISTRATIVE | Facility: OTHER | Age: 45
End: 2020-11-17

## 2020-11-17 ENCOUNTER — OFFICE VISIT (OUTPATIENT)
Dept: CARDIOLOGY | Facility: CLINIC | Age: 45
End: 2020-11-17
Payer: MEDICARE

## 2020-11-17 ENCOUNTER — LAB VISIT (OUTPATIENT)
Dept: LAB | Facility: HOSPITAL | Age: 45
End: 2020-11-17
Attending: NURSE PRACTITIONER
Payer: MEDICARE

## 2020-11-17 VITALS
HEART RATE: 81 BPM | SYSTOLIC BLOOD PRESSURE: 110 MMHG | HEIGHT: 64 IN | WEIGHT: 158.5 LBS | DIASTOLIC BLOOD PRESSURE: 64 MMHG | BODY MASS INDEX: 27.06 KG/M2

## 2020-11-17 DIAGNOSIS — E03.4 HYPOTHYROIDISM DUE TO ACQUIRED ATROPHY OF THYROID: ICD-10-CM

## 2020-11-17 DIAGNOSIS — R79.89 ELEVATED TROPONIN: ICD-10-CM

## 2020-11-17 DIAGNOSIS — D51.8 OTHER VITAMIN B12 DEFICIENCY ANEMIAS: ICD-10-CM

## 2020-11-17 DIAGNOSIS — N95.9 MENOPAUSAL DISORDER: ICD-10-CM

## 2020-11-17 DIAGNOSIS — D64.9 ANEMIA, UNSPECIFIED TYPE: ICD-10-CM

## 2020-11-17 DIAGNOSIS — R74.8 ELEVATED LIVER ENZYMES: ICD-10-CM

## 2020-11-17 DIAGNOSIS — R94.31 NONSPECIFIC ABNORMAL ELECTROCARDIOGRAM (ECG) (EKG): ICD-10-CM

## 2020-11-17 DIAGNOSIS — E78.5 HYPERLIPIDEMIA, UNSPECIFIED HYPERLIPIDEMIA TYPE: ICD-10-CM

## 2020-11-17 DIAGNOSIS — Z79.891 LONG TERM (CURRENT) USE OF OPIATE ANALGESIC: ICD-10-CM

## 2020-11-17 DIAGNOSIS — R55 SYNCOPE AND COLLAPSE: ICD-10-CM

## 2020-11-17 LAB
ALBUMIN SERPL BCP-MCNC: 3.8 G/DL (ref 3.5–5.2)
ALP SERPL-CCNC: 103 U/L (ref 55–135)
ALT SERPL W/O P-5'-P-CCNC: 30 U/L (ref 10–44)
ANION GAP SERPL CALC-SCNC: 9 MMOL/L (ref 8–16)
AST SERPL-CCNC: 14 U/L (ref 10–40)
BASOPHILS # BLD AUTO: 0.04 K/UL (ref 0–0.2)
BASOPHILS NFR BLD: 0.4 % (ref 0–1.9)
BILIRUB SERPL-MCNC: 0.3 MG/DL (ref 0.1–1)
BUN SERPL-MCNC: 13 MG/DL (ref 6–20)
CALCIUM SERPL-MCNC: 10.5 MG/DL (ref 8.7–10.5)
CHLORIDE SERPL-SCNC: 107 MMOL/L (ref 95–110)
CHOLEST SERPL-MCNC: 221 MG/DL (ref 120–199)
CHOLEST/HDLC SERPL: 5.3 {RATIO} (ref 2–5)
CO2 SERPL-SCNC: 24 MMOL/L (ref 23–29)
CREAT SERPL-MCNC: 0.7 MG/DL (ref 0.5–1.4)
DIFFERENTIAL METHOD: ABNORMAL
EOSINOPHIL # BLD AUTO: 0.2 K/UL (ref 0–0.5)
EOSINOPHIL NFR BLD: 1.4 % (ref 0–8)
ERYTHROCYTE [DISTWIDTH] IN BLOOD BY AUTOMATED COUNT: 12.2 % (ref 11.5–14.5)
EST. GFR  (AFRICAN AMERICAN): >60 ML/MIN/1.73 M^2
EST. GFR  (NON AFRICAN AMERICAN): >60 ML/MIN/1.73 M^2
ESTIMATED AVG GLUCOSE: 94 MG/DL (ref 68–131)
FERRITIN SERPL-MCNC: 68 NG/ML (ref 20–300)
FSH SERPL-ACNC: 11.3 MIU/ML
GLUCOSE SERPL-MCNC: 88 MG/DL (ref 70–110)
HBA1C MFR BLD HPLC: 4.9 % (ref 4–5.6)
HCT VFR BLD AUTO: 40.3 % (ref 37–48.5)
HDLC SERPL-MCNC: 42 MG/DL (ref 40–75)
HDLC SERPL: 19 % (ref 20–50)
HGB BLD-MCNC: 12.8 G/DL (ref 12–16)
IMM GRANULOCYTES # BLD AUTO: 0.04 K/UL (ref 0–0.04)
IMM GRANULOCYTES NFR BLD AUTO: 0.4 % (ref 0–0.5)
IRON SERPL-MCNC: 87 UG/DL (ref 30–160)
LDLC SERPL CALC-MCNC: 141.6 MG/DL (ref 63–159)
LYMPHOCYTES # BLD AUTO: 3.4 K/UL (ref 1–4.8)
LYMPHOCYTES NFR BLD: 32.4 % (ref 18–48)
MCH RBC QN AUTO: 28.6 PG (ref 27–31)
MCHC RBC AUTO-ENTMCNC: 31.8 G/DL (ref 32–36)
MCV RBC AUTO: 90 FL (ref 82–98)
MONOCYTES # BLD AUTO: 0.5 K/UL (ref 0.3–1)
MONOCYTES NFR BLD: 4.5 % (ref 4–15)
NEUTROPHILS # BLD AUTO: 6.3 K/UL (ref 1.8–7.7)
NEUTROPHILS NFR BLD: 60.9 % (ref 38–73)
NONHDLC SERPL-MCNC: 179 MG/DL
NRBC BLD-RTO: 0 /100 WBC
PLATELET # BLD AUTO: 345 K/UL (ref 150–350)
PMV BLD AUTO: 12.5 FL (ref 9.2–12.9)
POTASSIUM SERPL-SCNC: 4.5 MMOL/L (ref 3.5–5.1)
PROT SERPL-MCNC: 7 G/DL (ref 6–8.4)
RBC # BLD AUTO: 4.47 M/UL (ref 4–5.4)
SATURATED IRON: 22 % (ref 20–50)
SODIUM SERPL-SCNC: 140 MMOL/L (ref 136–145)
T3 SERPL-MCNC: 182 NG/DL (ref 60–180)
T4 FREE SERPL-MCNC: 1.18 NG/DL (ref 0.71–1.51)
T4 SERPL-MCNC: 9.6 UG/DL (ref 4.5–11.5)
TOTAL IRON BINDING CAPACITY: 398 UG/DL (ref 250–450)
TRANSFERRIN SERPL-MCNC: 269 MG/DL (ref 200–375)
TRIGL SERPL-MCNC: 187 MG/DL (ref 30–150)
TSH SERPL DL<=0.005 MIU/L-ACNC: <0.01 UIU/ML (ref 0.4–4)
WBC # BLD AUTO: 10.42 K/UL (ref 3.9–12.7)

## 2020-11-17 PROCEDURE — 1126F PR PAIN SEVERITY QUANTIFIED, NO PAIN PRESENT: ICD-10-PCS | Mod: HCNC,S$GLB,, | Performed by: INTERNAL MEDICINE

## 2020-11-17 PROCEDURE — 84443 ASSAY THYROID STIM HORMONE: CPT | Mod: HCNC

## 2020-11-17 PROCEDURE — 80053 COMPREHEN METABOLIC PANEL: CPT | Mod: HCNC

## 2020-11-17 PROCEDURE — 85025 COMPLETE CBC W/AUTO DIFF WBC: CPT | Mod: HCNC

## 2020-11-17 PROCEDURE — 3008F PR BODY MASS INDEX (BMI) DOCUMENTED: ICD-10-PCS | Mod: HCNC,CPTII,S$GLB, | Performed by: INTERNAL MEDICINE

## 2020-11-17 PROCEDURE — 84480 ASSAY TRIIODOTHYRONINE (T3): CPT | Mod: HCNC

## 2020-11-17 PROCEDURE — 36415 COLL VENOUS BLD VENIPUNCTURE: CPT | Mod: HCNC,PO

## 2020-11-17 PROCEDURE — 3074F PR MOST RECENT SYSTOLIC BLOOD PRESSURE < 130 MM HG: ICD-10-PCS | Mod: HCNC,CPTII,S$GLB, | Performed by: INTERNAL MEDICINE

## 2020-11-17 PROCEDURE — 99999 PR PBB SHADOW E&M-EST. PATIENT-LVL III: CPT | Mod: PBBFAC,HCNC,, | Performed by: INTERNAL MEDICINE

## 2020-11-17 PROCEDURE — 99204 OFFICE O/P NEW MOD 45 MIN: CPT | Mod: HCNC,S$GLB,, | Performed by: INTERNAL MEDICINE

## 2020-11-17 PROCEDURE — 84439 ASSAY OF FREE THYROXINE: CPT | Mod: HCNC

## 2020-11-17 PROCEDURE — 99204 PR OFFICE/OUTPT VISIT, NEW, LEVL IV, 45-59 MIN: ICD-10-PCS | Mod: HCNC,S$GLB,, | Performed by: INTERNAL MEDICINE

## 2020-11-17 PROCEDURE — 84403 ASSAY OF TOTAL TESTOSTERONE: CPT | Mod: HCNC

## 2020-11-17 PROCEDURE — 83036 HEMOGLOBIN GLYCOSYLATED A1C: CPT | Mod: HCNC

## 2020-11-17 PROCEDURE — 3008F BODY MASS INDEX DOCD: CPT | Mod: HCNC,CPTII,S$GLB, | Performed by: INTERNAL MEDICINE

## 2020-11-17 PROCEDURE — 3078F PR MOST RECENT DIASTOLIC BLOOD PRESSURE < 80 MM HG: ICD-10-PCS | Mod: HCNC,CPTII,S$GLB, | Performed by: INTERNAL MEDICINE

## 2020-11-17 PROCEDURE — 83001 ASSAY OF GONADOTROPIN (FSH): CPT | Mod: HCNC

## 2020-11-17 PROCEDURE — 99999 PR PBB SHADOW E&M-EST. PATIENT-LVL III: ICD-10-PCS | Mod: PBBFAC,HCNC,, | Performed by: INTERNAL MEDICINE

## 2020-11-17 PROCEDURE — 1126F AMNT PAIN NOTED NONE PRSNT: CPT | Mod: HCNC,S$GLB,, | Performed by: INTERNAL MEDICINE

## 2020-11-17 PROCEDURE — 83540 ASSAY OF IRON: CPT | Mod: HCNC

## 2020-11-17 PROCEDURE — 3078F DIAST BP <80 MM HG: CPT | Mod: HCNC,CPTII,S$GLB, | Performed by: INTERNAL MEDICINE

## 2020-11-17 PROCEDURE — 82728 ASSAY OF FERRITIN: CPT | Mod: HCNC

## 2020-11-17 PROCEDURE — 82746 ASSAY OF FOLIC ACID SERUM: CPT | Mod: HCNC

## 2020-11-17 PROCEDURE — 84436 ASSAY OF TOTAL THYROXINE: CPT | Mod: HCNC

## 2020-11-17 PROCEDURE — 3074F SYST BP LT 130 MM HG: CPT | Mod: HCNC,CPTII,S$GLB, | Performed by: INTERNAL MEDICINE

## 2020-11-17 PROCEDURE — 80061 LIPID PANEL: CPT | Mod: HCNC

## 2020-11-17 NOTE — LETTER
November 17, 2020      Germaine Wesley, JASON  [address] [facility] [address]  [facility] [address]           [facility]  [address] [place] [address]  Phone: [phone]          Patient: Jamila Lee   MR Number: [id number]   YOB: 1975   Date of Visit: 11/17/2020       Dear Germaine Wesley:    Thank you for referring Jamila Lee to me for evaluation. Attached you will find relevant portions of my assessment and plan of care.    If you have questions, please do not hesitate to call me. I look forward to following Jamila Lee along with you.    Sincerely,    Devyn Childers Jr., MD    Enclosure  CC:  No Recipients    If you would like to receive this communication electronically, please contact [email or web address] or [phone] to request more information on [facility] access.    For providers and/or their staff who would like to refer a patient to [facility], please contact us through our one-stop-shop provider referral line, Chio Jerez, at [phone].    If you feel you have received this communication in error or would no longer like to receive these types of communications, please e-mail [email or web address]

## 2020-11-17 NOTE — PROGRESS NOTES
Subjective:    Patient ID:  Jamila Lee is a 45 y.o. female who presents for evaluation of new pt ([facility] f/u) and Dizziness      HPI45 yo WF with chronic pain syndrome on methadone went to [place] with collapse and was intubated. Troponin elevated and ischemic work up initiated but according to records patient signed out AMA. Here today to have her heart checked out.    Review of Systems   Constitution: Negative for decreased appetite, fever, malaise/fatigue, weight gain and weight loss.   HENT: Negative for hearing loss and nosebleeds.    Eyes: Negative for visual disturbance.   Cardiovascular: Positive for chest pain and syncope. Negative for claudication, cyanosis, dyspnea on exertion, irregular heartbeat, leg swelling, near-syncope, orthopnea, palpitations and paroxysmal nocturnal dyspnea.   Respiratory: Negative for cough, hemoptysis, shortness of breath, sleep disturbances due to breathing, snoring and wheezing.    Endocrine: Negative for cold intolerance, heat intolerance, polydipsia and polyuria.   Hematologic/Lymphatic: Negative for adenopathy and bleeding problem. Does not bruise/bleed easily.   Skin: Negative for color change, itching, poor wound healing, rash and suspicious lesions.   Musculoskeletal: Positive for back pain, joint pain, joint swelling, muscle weakness, myalgias and stiffness. Negative for arthritis, falls and muscle cramps.   Gastrointestinal: Negative for bloating, abdominal pain, change in bowel habit, constipation, flatus, heartburn, hematemesis, hematochezia, hemorrhoids, jaundice, melena, nausea and vomiting.   Genitourinary: Negative for bladder incontinence, decreased libido, frequency, hematuria, hesitancy and urgency.   Neurological: Negative for brief paralysis, difficulty with concentration, excessive daytime sleepiness, dizziness, focal weakness, headaches, light-headedness, loss of balance, numbness, vertigo and weakness.   Psychiatric/Behavioral: Negative for  "altered mental status, depression and memory loss. The patient does not have insomnia and is not nervous/anxious.    Allergic/Immunologic: Negative for environmental allergies, hives and persistent infections.        Objective:    Physical Exam   Constitutional: She is oriented to person, place, and time. She appears well-developed and well-nourished.   /64   Pulse 81   Ht 5' 4" (1.626 m)   Wt 71.9 kg (158 lb 8.2 oz)   BMI 27.21 kg/m²      HENT:   Head: Normocephalic and atraumatic.   Right Ear: External ear normal.   Left Ear: External ear normal.   Nose: Nose normal.   Mouth/Throat: Oropharynx is clear and moist.   Eyes: Pupils are equal, round, and reactive to light. Conjunctivae, EOM and lids are normal. Right eye exhibits no discharge. Left eye exhibits no discharge. Right conjunctiva has no hemorrhage. No scleral icterus.   Neck: Normal range of motion. Neck supple. No JVD present. No tracheal deviation present. No thyromegaly present.   Cardiovascular: Normal rate, regular rhythm, normal heart sounds and intact distal pulses. Exam reveals no gallop and no friction rub.   No murmur heard.  Pulmonary/Chest: Effort normal and breath sounds normal. No respiratory distress. She has no wheezes. She has no rales. She exhibits no tenderness. Breasts are symmetrical.   Abdominal: Soft. Bowel sounds are normal. She exhibits no distension and no mass. There is no hepatosplenomegaly or hepatomegaly. There is no abdominal tenderness. There is no rebound and no guarding.   Musculoskeletal: Normal range of motion.         General: No tenderness or edema.      Comments: Unsteady gait   Lymphadenopathy:     She has no cervical adenopathy.   Neurological: She is alert and oriented to person, place, and time. She displays normal reflexes. No cranial nerve deficit. Coordination normal.   Skin: Skin is warm and dry. No rash noted. No erythema. No pallor.   Psychiatric: She has a normal mood and affect. Her behavior is " normal. Judgment and thought content normal.   Nursing note and vitals reviewed.        Assessment:       1. Syncope and collapse    2. Nonspecific abnormal electrocardiogram (ECG) (EKG)    3. Elevated troponin         Plan:     Because of significance of event will do complete work up      Orders Placed This Encounter   Procedures    Nuclear Stress - Cardiology Interpreted    Holter monitor - 48 hour    Echo Color Flow Doppler? Yes     No follow-ups on file.

## 2020-11-17 NOTE — PROGRESS NOTES
LINKS immunization registry not responding  Care Everywhere updated  Health Maintenance updated  DIS/Chart reviewed for overdue Proactive [facility] Encounters ([place]) health maintenance testing (CRS, Breast Ca, Diabetic Eye Exam)   Orders entered:N/A  Portal message sent to patient with scheduling link for mammogram

## 2020-11-18 ENCOUNTER — TELEPHONE (OUTPATIENT)
Dept: FAMILY MEDICINE | Facility: CLINIC | Age: 45
End: 2020-11-18

## 2020-11-18 ENCOUNTER — PATIENT MESSAGE (OUTPATIENT)
Dept: FAMILY MEDICINE | Facility: CLINIC | Age: 45
End: 2020-11-18

## 2020-11-18 DIAGNOSIS — E03.4 HYPOTHYROIDISM DUE TO ACQUIRED ATROPHY OF THYROID: Primary | ICD-10-CM

## 2020-11-18 LAB
FOLATE SERPL-MCNC: 2.7 NG/ML (ref 4–24)
TESTOST SERPL-MCNC: 19 NG/DL (ref 5–73)

## 2020-11-18 RX ORDER — LEVOTHYROXINE SODIUM 150 UG/1
150 TABLET ORAL DAILY
Qty: 30 TABLET | Refills: 2 | Status: SHIPPED | OUTPATIENT
Start: 2020-11-18 | End: 2021-01-11

## 2020-11-18 NOTE — TELEPHONE ENCOUNTER
----- Message from Meaghan Zambrano sent at 11/18/2020 12:36 PM CST -----  Contact: call  pt [phone]    Type:  Patient Returning Call    Who Called:  pt  Who Left Message for Patient:  nurse  Does the patient know what this is regarding?:    test results  Best Call Back Number:  call  pt [phone]  Additional Information:   please call  // pt  missed the  last  call

## 2020-11-19 DIAGNOSIS — F41.9 ANXIETY: ICD-10-CM

## 2020-11-19 RX ORDER — FLUOXETINE HYDROCHLORIDE 20 MG/1
20 CAPSULE ORAL 2 TIMES DAILY
Qty: 180 CAPSULE | Refills: 2
Start: 2020-11-19 | End: 2020-12-04 | Stop reason: SDUPTHER

## 2020-12-04 DIAGNOSIS — F41.9 ANXIETY: ICD-10-CM

## 2020-12-04 RX ORDER — FLUOXETINE HYDROCHLORIDE 20 MG/1
20 CAPSULE ORAL 2 TIMES DAILY
Qty: 180 CAPSULE | Refills: 2
Start: 2020-12-04 | End: 2021-12-04

## 2020-12-16 ENCOUNTER — TELEPHONE (OUTPATIENT)
Dept: PSYCHIATRY | Facility: CLINIC | Age: 45
End: 2020-12-16

## 2020-12-16 NOTE — TELEPHONE ENCOUNTER
Appointment Request From: Jamila Lee      With Provider: Margaret Lara PA-C [[facility]]      Preferred Date Range: Any date 1/5/2021 or later      Preferred Times: Any Time      Reason for visit: Office Visit      Comments:   Anxiety, panic attacks, insomnia, MDD, bipolar

## 2021-02-01 DIAGNOSIS — G43.719 INTRACTABLE CHRONIC MIGRAINE WITHOUT AURA AND WITHOUT STATUS MIGRAINOSUS: ICD-10-CM

## 2021-02-02 ENCOUNTER — TELEPHONE (OUTPATIENT)
Dept: PSYCHIATRY | Facility: CLINIC | Age: 46
End: 2021-02-02

## 2021-02-02 RX ORDER — IBUPROFEN 600 MG/1
600 TABLET ORAL 3 TIMES DAILY
Qty: 60 TABLET | Refills: 3 | Status: SHIPPED | OUTPATIENT
Start: 2021-02-02 | End: 2021-04-07

## 2021-02-02 RX ORDER — BUTALBITAL, ACETAMINOPHEN AND CAFFEINE 50; 325; 40 MG/1; MG/1; MG/1
1 TABLET ORAL EVERY 4 HOURS PRN
Qty: 60 TABLET | Refills: 2 | Status: SHIPPED | OUTPATIENT
Start: 2021-02-02 | End: 2021-02-20

## 2021-03-02 DIAGNOSIS — G43.719 INTRACTABLE CHRONIC MIGRAINE WITHOUT AURA AND WITHOUT STATUS MIGRAINOSUS: ICD-10-CM

## 2021-03-02 RX ORDER — BUTALBITAL, ACETAMINOPHEN AND CAFFEINE 50; 325; 40 MG/1; MG/1; MG/1
TABLET ORAL
Qty: 60 TABLET | Refills: 2 | Status: SHIPPED | OUTPATIENT
Start: 2021-03-02 | End: 2021-03-02

## 2021-03-03 RX ORDER — BUTALBITAL, ACETAMINOPHEN AND CAFFEINE 50; 325; 40 MG/1; MG/1; MG/1
TABLET ORAL
Qty: 60 TABLET | Refills: 2 | Status: SHIPPED | OUTPATIENT
Start: 2021-03-03 | End: 2021-03-25

## 2021-03-03 RX ORDER — BUTALBITAL, ACETAMINOPHEN AND CAFFEINE 50; 325; 40 MG/1; MG/1; MG/1
TABLET ORAL
Qty: 60 TABLET | Refills: 2 | Status: SHIPPED | OUTPATIENT
Start: 2021-03-03 | End: 2021-03-03 | Stop reason: SDUPTHER

## 2021-04-07 RX ORDER — IBUPROFEN 600 MG/1
TABLET ORAL
Qty: 240 TABLET | Refills: 1 | Status: SHIPPED | OUTPATIENT
Start: 2021-04-07 | End: 2021-07-08 | Stop reason: SDUPTHER

## 2021-04-26 DIAGNOSIS — G43.719 INTRACTABLE CHRONIC MIGRAINE WITHOUT AURA AND WITHOUT STATUS MIGRAINOSUS: ICD-10-CM

## 2021-04-26 RX ORDER — BUTALBITAL, ACETAMINOPHEN AND CAFFEINE 50; 325; 40 MG/1; MG/1; MG/1
TABLET ORAL
Qty: 60 TABLET | Refills: 0 | Status: SHIPPED | OUTPATIENT
Start: 2021-04-26 | End: 2021-05-14

## 2021-05-06 ENCOUNTER — PATIENT MESSAGE (OUTPATIENT)
Dept: RESEARCH | Facility: HOSPITAL | Age: 46
End: 2021-05-06

## 2021-05-27 DIAGNOSIS — G43.719 INTRACTABLE CHRONIC MIGRAINE WITHOUT AURA AND WITHOUT STATUS MIGRAINOSUS: ICD-10-CM

## 2021-05-28 RX ORDER — BUTALBITAL, ACETAMINOPHEN AND CAFFEINE 50; 325; 40 MG/1; MG/1; MG/1
TABLET ORAL
Qty: 60 TABLET | Refills: 2 | Status: SHIPPED | OUTPATIENT
Start: 2021-05-28 | End: 2021-07-01

## 2021-06-23 RX ORDER — IBUPROFEN 600 MG/1
TABLET ORAL
Qty: 270 TABLET | OUTPATIENT
Start: 2021-06-23

## 2021-07-08 DIAGNOSIS — E03.4 HYPOTHYROIDISM DUE TO ACQUIRED ATROPHY OF THYROID: ICD-10-CM

## 2021-07-08 DIAGNOSIS — I10 ESSENTIAL HYPERTENSION: Primary | ICD-10-CM

## 2021-07-08 RX ORDER — LEVOTHYROXINE SODIUM 150 UG/1
150 TABLET ORAL DAILY
Qty: 30 TABLET | Refills: 2 | Status: CANCELLED | OUTPATIENT
Start: 2021-07-08

## 2021-07-09 RX ORDER — MIRTAZAPINE 30 MG/1
30 TABLET, FILM COATED ORAL NIGHTLY
Qty: 90 TABLET | Refills: 0 | Status: SHIPPED | OUTPATIENT
Start: 2021-07-09

## 2021-07-09 RX ORDER — PRAZOSIN HYDROCHLORIDE 2 MG/1
CAPSULE ORAL
Qty: 180 CAPSULE | Refills: 0 | Status: SHIPPED | OUTPATIENT
Start: 2021-07-09

## 2021-07-09 RX ORDER — PROPRANOLOL HYDROCHLORIDE 10 MG/1
10 TABLET ORAL 2 TIMES DAILY
Qty: 180 TABLET | Refills: 0 | Status: SHIPPED | OUTPATIENT
Start: 2021-07-09

## 2021-07-09 RX ORDER — IBUPROFEN 600 MG/1
600 TABLET ORAL 3 TIMES DAILY
Qty: 240 TABLET | Refills: 0 | Status: SHIPPED | OUTPATIENT
Start: 2021-07-09

## 2021-07-14 ENCOUNTER — TELEPHONE (OUTPATIENT)
Dept: FAMILY MEDICINE | Facility: CLINIC | Age: 46
End: 2021-07-14

## 2022-01-01 NOTE — TELEPHONE ENCOUNTER
Attempted to call pt, left message on voicemail to return call to clinic.     Lactation Progress Note      Data:     F/u during lactation rounds with primip breast feeder, who delivered this morning by . Mother reports that infant continues latching and breast feeding well, stating that her infant has had 3 good feedings so far, and just finished a good 28 minute feeding. Mother states that she has a much easier time latching in football position and the latch has been comfortable so far. Action: Introduced self as  [employer] on for this evening and offered support. Reviewed importance of LEOBARDO, educating on how a good latch should look and feel and encouraged to call for  to assess the latch with the next feeding and as needed during hospital stay. Educated that the latch should feel comfortable without pinching or pain, and instructed on how to break the suction of the latch as needed if ever experiencing discomfort. Reviewed what to expect with breast feeding over the next 24-48 hours including breast care, how milk production works and types of milk mother will produce, educated on signs of hunger/satiety and expected  feeding behaviors, as well as reassuring signs that baby is getting enough at the breast including daily goals for infant feedings, output, and weight trends. Encouraged to offer the breast when infant first begins to wake and show early hunger cues, and every 2-3 hours if baby is sleepy and without feeding cues. Gave tips to wake sleepy baby as needed, and encouraged much hand expression of colostrum and STS contact with attempts to offer the breast. Instruction provided on how to hand express colostrum. Instructed that baby should have a minimum of 8-12 good feedings in a 24 hour period after the first DOL. Encouraged exclusive breast feeding, educating on benefits, and how milk production works. Instructed on inpatient and outpatient lactation support services and how to contact. Name and number provided on whiteboard.  Encouraged to call for  [employer] to assess latch and for f/u support prn. Response: Verbalized understanding of teaching provided. Pleased with how infant is doing with breast feeding so far. Will call for f/u support prn.

## 2022-01-11 ENCOUNTER — HOSPITAL ENCOUNTER (INPATIENT)
Facility: HOSPITAL | Age: 47
LOS: 5 days | Discharge: HOME | DRG: 603 | End: 2022-01-18
Attending: EMERGENCY MEDICINE | Admitting: STUDENT IN AN ORGANIZED HEALTH CARE EDUCATION/TRAINING PROGRAM
Payer: MEDICARE

## 2022-01-11 DIAGNOSIS — L03.90 CELLULITIS, UNSPECIFIED CELLULITIS SITE: ICD-10-CM

## 2022-01-11 DIAGNOSIS — L02.31 CELLULITIS AND ABSCESS OF BUTTOCK: Primary | ICD-10-CM

## 2022-01-11 DIAGNOSIS — L03.317 CELLULITIS OF BUTTOCK: ICD-10-CM

## 2022-01-11 DIAGNOSIS — L03.317 CELLULITIS AND ABSCESS OF BUTTOCK: Primary | ICD-10-CM

## 2022-01-11 DIAGNOSIS — L03.90 CELLULITIS: ICD-10-CM

## 2022-01-11 LAB
ALBUMIN SERPL BCP-MCNC: 3.1 G/DL (ref 3.5–5.2)
ALP SERPL-CCNC: 126 U/L (ref 55–135)
ALT SERPL W/O P-5'-P-CCNC: 53 U/L (ref 10–44)
ANION GAP SERPL CALC-SCNC: 14 MMOL/L (ref 8–16)
AST SERPL-CCNC: 24 U/L (ref 10–40)
BASOPHILS # BLD AUTO: 0.06 K/UL (ref 0–0.2)
BASOPHILS NFR BLD: 0.6 % (ref 0–1.9)
BILIRUB SERPL-MCNC: 0.4 MG/DL (ref 0.1–1)
BUN SERPL-MCNC: 10 MG/DL (ref 6–20)
CALCIUM SERPL-MCNC: 10.1 MG/DL (ref 8.7–10.5)
CHLORIDE SERPL-SCNC: 104 MMOL/L (ref 95–110)
CO2 SERPL-SCNC: 22 MMOL/L (ref 23–29)
CREAT SERPL-MCNC: 0.8 MG/DL (ref 0.5–1.4)
DIFFERENTIAL METHOD: ABNORMAL
EOSINOPHIL # BLD AUTO: 0.2 K/UL (ref 0–0.5)
EOSINOPHIL NFR BLD: 2 % (ref 0–8)
ERYTHROCYTE [DISTWIDTH] IN BLOOD BY AUTOMATED COUNT: 12.7 % (ref 11.5–14.5)
EST. GFR  (AFRICAN AMERICAN): >60 ML/MIN/1.73 M^2
EST. GFR  (NON AFRICAN AMERICAN): >60 ML/MIN/1.73 M^2
GLUCOSE SERPL-MCNC: 100 MG/DL (ref 70–110)
HCT VFR BLD AUTO: 33.6 % (ref 37–48.5)
HGB BLD-MCNC: 11.4 G/DL (ref 12–16)
IMM GRANULOCYTES # BLD AUTO: 0.05 K/UL (ref 0–0.04)
IMM GRANULOCYTES NFR BLD AUTO: 0.5 % (ref 0–0.5)
LYMPHOCYTES # BLD AUTO: 2.8 K/UL (ref 1–4.8)
LYMPHOCYTES NFR BLD: 28.1 % (ref 18–48)
MCH RBC QN AUTO: 30.4 PG (ref 27–31)
MCHC RBC AUTO-ENTMCNC: 33.9 G/DL (ref 32–36)
MCV RBC AUTO: 90 FL (ref 82–98)
MONOCYTES # BLD AUTO: 0.7 K/UL (ref 0.3–1)
MONOCYTES NFR BLD: 7.3 % (ref 4–15)
NEUTROPHILS # BLD AUTO: 6.1 K/UL (ref 1.8–7.7)
NEUTROPHILS NFR BLD: 61.5 % (ref 38–73)
NRBC BLD-RTO: 0 /100 WBC
PLATELET # BLD AUTO: 531 K/UL (ref 150–450)
PMV BLD AUTO: 9.7 FL (ref 9.2–12.9)
POTASSIUM SERPL-SCNC: 3.2 MMOL/L (ref 3.5–5.1)
PROT SERPL-MCNC: 6.9 G/DL (ref 6–8.4)
RBC # BLD AUTO: 3.75 M/UL (ref 4–5.4)
SARS-COV-2 RDRP RESP QL NAA+PROBE: NEGATIVE
SODIUM SERPL-SCNC: 140 MMOL/L (ref 136–145)
WBC # BLD AUTO: 9.98 K/UL (ref 3.9–12.7)

## 2022-01-11 PROCEDURE — 25500020 PHARM REV CODE 255: Mod: HCNC

## 2022-01-11 PROCEDURE — U0002 COVID-19 LAB TEST NON-CDC: HCPCS | Mod: HCNC | Performed by: NURSE PRACTITIONER

## 2022-01-11 PROCEDURE — G0378 HOSPITAL OBSERVATION PER HR: HCPCS | Mod: HCNC

## 2022-01-11 PROCEDURE — 63600175 PHARM REV CODE 636 W HCPCS: Mod: HCNC | Performed by: EMERGENCY MEDICINE

## 2022-01-11 PROCEDURE — 80053 COMPREHEN METABOLIC PANEL: CPT | Mod: HCNC | Performed by: EMERGENCY MEDICINE

## 2022-01-11 PROCEDURE — 94760 N-INVAS EAR/PLS OXIMETRY 1: CPT | Mod: HCNC

## 2022-01-11 PROCEDURE — 85025 COMPLETE CBC W/AUTO DIFF WBC: CPT | Mod: HCNC | Performed by: EMERGENCY MEDICINE

## 2022-01-11 PROCEDURE — 63600175 PHARM REV CODE 636 W HCPCS: Mod: HCNC | Performed by: NURSE PRACTITIONER

## 2022-01-11 PROCEDURE — 25000003 PHARM REV CODE 250: Mod: HCNC | Performed by: EMERGENCY MEDICINE

## 2022-01-11 PROCEDURE — 36415 COLL VENOUS BLD VENIPUNCTURE: CPT | Mod: HCNC | Performed by: EMERGENCY MEDICINE

## 2022-01-11 PROCEDURE — 99285 EMERGENCY DEPT VISIT HI MDM: CPT | Mod: 25,HCNC

## 2022-01-11 PROCEDURE — 25000003 PHARM REV CODE 250: Mod: HCNC | Performed by: NURSE PRACTITIONER

## 2022-01-11 RX ORDER — MORPHINE SULFATE 4 MG/ML
4 INJECTION, SOLUTION INTRAMUSCULAR; INTRAVENOUS
Status: COMPLETED | OUTPATIENT
Start: 2022-01-11 | End: 2022-01-11

## 2022-01-11 RX ORDER — CLINDAMYCIN PHOSPHATE 900 MG/50ML
900 INJECTION, SOLUTION INTRAVENOUS
Status: COMPLETED | OUTPATIENT
Start: 2022-01-11 | End: 2022-01-11

## 2022-01-11 RX ORDER — SODIUM,POTASSIUM PHOSPHATES 280-250MG
2 POWDER IN PACKET (EA) ORAL
Status: DISCONTINUED | OUTPATIENT
Start: 2022-01-11 | End: 2022-01-18 | Stop reason: HOSPADM

## 2022-01-11 RX ORDER — LEVOTHYROXINE SODIUM 150 UG/1
150 TABLET ORAL DAILY
Status: DISCONTINUED | OUTPATIENT
Start: 2022-01-12 | End: 2022-01-18 | Stop reason: HOSPADM

## 2022-01-11 RX ORDER — SODIUM CHLORIDE 0.9 % (FLUSH) 0.9 %
3 SYRINGE (ML) INJECTION
Status: DISCONTINUED | OUTPATIENT
Start: 2022-01-11 | End: 2022-01-18 | Stop reason: HOSPADM

## 2022-01-11 RX ORDER — GABAPENTIN 100 MG/1
100 CAPSULE ORAL 3 TIMES DAILY
Status: DISCONTINUED | OUTPATIENT
Start: 2022-01-11 | End: 2022-01-12

## 2022-01-11 RX ORDER — HYDROCODONE BITARTRATE AND ACETAMINOPHEN 5; 325 MG/1; MG/1
1 TABLET ORAL EVERY 6 HOURS PRN
Status: DISCONTINUED | OUTPATIENT
Start: 2022-01-11 | End: 2022-01-13

## 2022-01-11 RX ORDER — MORPHINE SULFATE 4 MG/ML
4 INJECTION, SOLUTION INTRAMUSCULAR; INTRAVENOUS EVERY 6 HOURS PRN
Status: DISCONTINUED | OUTPATIENT
Start: 2022-01-11 | End: 2022-01-13

## 2022-01-11 RX ORDER — CLINDAMYCIN PHOSPHATE 600 MG/50ML
600 INJECTION, SOLUTION INTRAVENOUS
Status: DISCONTINUED | OUTPATIENT
Start: 2022-01-11 | End: 2022-01-14

## 2022-01-11 RX ORDER — LANOLIN ALCOHOL/MO/W.PET/CERES
800 CREAM (GRAM) TOPICAL
Status: DISCONTINUED | OUTPATIENT
Start: 2022-01-11 | End: 2022-01-18 | Stop reason: HOSPADM

## 2022-01-11 RX ORDER — SODIUM CHLORIDE 0.9 % (FLUSH) 0.9 %
.1-1 SYRINGE (ML) INJECTION
Status: DISCONTINUED | OUTPATIENT
Start: 2022-01-11 | End: 2022-01-18 | Stop reason: HOSPADM

## 2022-01-11 RX ORDER — SODIUM CHLORIDE 9 MG/ML
INJECTION, SOLUTION INTRAVENOUS CONTINUOUS
Status: DISCONTINUED | OUTPATIENT
Start: 2022-01-11 | End: 2022-01-18 | Stop reason: HOSPADM

## 2022-01-11 RX ORDER — FLUOXETINE HYDROCHLORIDE 20 MG/1
20 CAPSULE ORAL 2 TIMES DAILY
Status: DISCONTINUED | OUTPATIENT
Start: 2022-01-11 | End: 2022-01-18 | Stop reason: HOSPADM

## 2022-01-11 RX ORDER — ACETAMINOPHEN 325 MG/1
650 TABLET ORAL EVERY 4 HOURS PRN
Status: DISCONTINUED | OUTPATIENT
Start: 2022-01-11 | End: 2022-01-18 | Stop reason: HOSPADM

## 2022-01-11 RX ORDER — HYDROCODONE BITARTRATE AND ACETAMINOPHEN 10; 325 MG/1; MG/1
1 TABLET ORAL EVERY 6 HOURS PRN
Status: DISCONTINUED | OUTPATIENT
Start: 2022-01-11 | End: 2022-01-11

## 2022-01-11 RX ORDER — ONDANSETRON 4 MG/1
4 TABLET, ORALLY DISINTEGRATING ORAL EVERY 6 HOURS PRN
Status: DISCONTINUED | OUTPATIENT
Start: 2022-01-11 | End: 2022-01-18 | Stop reason: HOSPADM

## 2022-01-11 RX ORDER — BACLOFEN 10 MG/1
10 TABLET ORAL NIGHTLY
Status: DISCONTINUED | OUTPATIENT
Start: 2022-01-11 | End: 2022-01-18 | Stop reason: HOSPADM

## 2022-01-11 RX ORDER — LAMOTRIGINE 25 MG/1
50 TABLET ORAL NIGHTLY
Status: DISCONTINUED | OUTPATIENT
Start: 2022-01-11 | End: 2022-01-18 | Stop reason: HOSPADM

## 2022-01-11 RX ORDER — ATORVASTATIN CALCIUM 10 MG/1
10 TABLET, FILM COATED ORAL DAILY
Status: DISCONTINUED | OUTPATIENT
Start: 2022-01-12 | End: 2022-01-18 | Stop reason: HOSPADM

## 2022-01-11 RX ADMIN — MORPHINE SULFATE 4 MG: 4 INJECTION INTRAVENOUS at 10:01

## 2022-01-11 RX ADMIN — FLUOXETINE 20 MG: 20 CAPSULE ORAL at 10:01

## 2022-01-11 RX ADMIN — MORPHINE SULFATE 4 MG: 4 INJECTION INTRAVENOUS at 02:01

## 2022-01-11 RX ADMIN — IOHEXOL 75 ML: 350 INJECTION, SOLUTION INTRAVENOUS at 02:01

## 2022-01-11 RX ADMIN — POTASSIUM BICARBONATE 35 MEQ: 391 TABLET, EFFERVESCENT ORAL at 10:01

## 2022-01-11 RX ADMIN — CLINDAMYCIN PHOSPHATE 900 MG: 900 INJECTION, SOLUTION INTRAVENOUS at 03:01

## 2022-01-11 RX ADMIN — CLINDAMYCIN IN 5 PERCENT DEXTROSE 600 MG: 12 INJECTION, SOLUTION INTRAVENOUS at 06:01

## 2022-01-11 RX ADMIN — GABAPENTIN 100 MG: 100 CAPSULE ORAL at 10:01

## 2022-01-11 RX ADMIN — LAMOTRIGINE 50 MG: 25 TABLET ORAL at 10:01

## 2022-01-11 RX ADMIN — HYDROCODONE BITARTRATE AND ACETAMINOPHEN 1 TABLET: 10; 325 TABLET ORAL at 07:01

## 2022-01-11 RX ADMIN — SODIUM CHLORIDE: 0.9 INJECTION, SOLUTION INTRAVENOUS at 03:01

## 2022-01-11 RX ADMIN — BACLOFEN 10 MG: 10 TABLET ORAL at 10:01
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
## 2022-01-11 NOTE — ED NOTES
Assumed care:  Jamila Lee is awake, alert and oriented x 3, skin warm and dry, in NAD with family at bedside.        Patient identifiers for Jamila Lee checked and correct.  LOC:  Jamila Lee is awake, alert, and aware of environment with an appropriate affect. She is oriented x 3 and speaking appropriately.  APPEARANCE:  She is resting comfortably and in no acute distress. She is clean and well groomed, patient's clothing is properly fastened.  SKIN:  The skin is warm and dry. She has normal skin turgor and moist mucus membranes.  Abscess to left buttock  MUSCULOSKELETAL:  She is moving all extremities well, no obvious deformities noted. Pulses intact.   RESPIRATORY:  Airway is open and patent. Respirations are spontaneous and non-labored with normal effort and rate.  CARDIAC:  She has a normal rate and rhythm. No peripheral edema noted. Capillary refill < 3 seconds.  ABDOMEN:  No distention noted.  Soft and non-tender upon palpation.  NEUROLOGICAL:  PERRL. Facial expression is symmetrical. Hand grasps are equal bilaterally. Normal sensation in all extremities when touched with finger.  Allergies reported:    Review of patient's allergies indicates:   Allergen Reactions    Doxycycline Other (See Comments)    Vancomycin analogues      OTHER NOTES:  Jamila Lee is here with

## 2022-01-11 NOTE — ASSESSMENT & PLAN NOTE
Patient has hypokalemia which is currently uncontrolled. Last electrolytes reviewed-   Recent Labs   Lab 01/11/22  1403   K 3.2*   . Will replace potassium and monitor electrolytes closely.
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
## 2022-01-12 ENCOUNTER — ANESTHESIA (OUTPATIENT)
Dept: SURGERY | Facility: HOSPITAL | Age: 47
DRG: 603 | End: 2022-01-12
Payer: MEDICARE

## 2022-01-12 ENCOUNTER — ANESTHESIA EVENT (OUTPATIENT)
Dept: SURGERY | Facility: HOSPITAL | Age: 47
DRG: 603 | End: 2022-01-12
Payer: MEDICARE

## 2022-01-12 LAB
ALBUMIN SERPL BCP-MCNC: 2.7 G/DL (ref 3.5–5.2)
ALP SERPL-CCNC: 165 U/L (ref 55–135)
ALT SERPL W/O P-5'-P-CCNC: 79 U/L (ref 10–44)
ANION GAP SERPL CALC-SCNC: 9 MMOL/L (ref 8–16)
AST SERPL-CCNC: 66 U/L (ref 10–40)
BILIRUB SERPL-MCNC: 0.2 MG/DL (ref 0.1–1)
BUN SERPL-MCNC: 9 MG/DL (ref 6–20)
CALCIUM SERPL-MCNC: 9.1 MG/DL (ref 8.7–10.5)
CHLORIDE SERPL-SCNC: 102 MMOL/L (ref 95–110)
CO2 SERPL-SCNC: 26 MMOL/L (ref 23–29)
CREAT SERPL-MCNC: 0.7 MG/DL (ref 0.5–1.4)
EST. GFR  (AFRICAN AMERICAN): >60 ML/MIN/1.73 M^2
EST. GFR  (NON AFRICAN AMERICAN): >60 ML/MIN/1.73 M^2
GLUCOSE SERPL-MCNC: 84 MG/DL (ref 70–110)
MAGNESIUM SERPL-MCNC: 1.8 MG/DL (ref 1.6–2.6)
PHOSPHATE SERPL-MCNC: 2.3 MG/DL (ref 2.7–4.5)
POTASSIUM SERPL-SCNC: 4.5 MMOL/L (ref 3.5–5.1)
PROT SERPL-MCNC: 6 G/DL (ref 6–8.4)
SODIUM SERPL-SCNC: 137 MMOL/L (ref 136–145)

## 2022-01-12 PROCEDURE — 36000705 HC OR TIME LEV I EA ADD 15 MIN: Mod: HCNC | Performed by: STUDENT IN AN ORGANIZED HEALTH CARE EDUCATION/TRAINING PROGRAM

## 2022-01-12 PROCEDURE — D9220A PRA ANESTHESIA: Mod: HCNC,CRNA,, | Performed by: NURSE ANESTHETIST, CERTIFIED REGISTERED

## 2022-01-12 PROCEDURE — 99223 1ST HOSP IP/OBS HIGH 75: CPT | Mod: HCNC,57,, | Performed by: STUDENT IN AN ORGANIZED HEALTH CARE EDUCATION/TRAINING PROGRAM

## 2022-01-12 PROCEDURE — 36000704 HC OR TIME LEV I 1ST 15 MIN: Mod: HCNC | Performed by: STUDENT IN AN ORGANIZED HEALTH CARE EDUCATION/TRAINING PROGRAM

## 2022-01-12 PROCEDURE — 25000003 PHARM REV CODE 250: Mod: HCNC | Performed by: NURSE PRACTITIONER

## 2022-01-12 PROCEDURE — 37000009 HC ANESTHESIA EA ADD 15 MINS: Mod: HCNC | Performed by: STUDENT IN AN ORGANIZED HEALTH CARE EDUCATION/TRAINING PROGRAM

## 2022-01-12 PROCEDURE — G0378 HOSPITAL OBSERVATION PER HR: HCPCS | Mod: HCNC

## 2022-01-12 PROCEDURE — 63600175 PHARM REV CODE 636 W HCPCS: Mod: HCNC | Performed by: ANESTHESIOLOGY

## 2022-01-12 PROCEDURE — 87070 CULTURE OTHR SPECIMN AEROBIC: CPT | Mod: HCNC | Performed by: STUDENT IN AN ORGANIZED HEALTH CARE EDUCATION/TRAINING PROGRAM

## 2022-01-12 PROCEDURE — D9220A PRA ANESTHESIA: ICD-10-PCS | Mod: HCNC,CRNA,, | Performed by: NURSE ANESTHETIST, CERTIFIED REGISTERED

## 2022-01-12 PROCEDURE — 63600175 PHARM REV CODE 636 W HCPCS: Mod: HCNC | Performed by: NURSE PRACTITIONER

## 2022-01-12 PROCEDURE — 99223 PR INITIAL HOSPITAL CARE,LEVL III: ICD-10-PCS | Mod: HCNC,57,, | Performed by: STUDENT IN AN ORGANIZED HEALTH CARE EDUCATION/TRAINING PROGRAM

## 2022-01-12 PROCEDURE — 36415 COLL VENOUS BLD VENIPUNCTURE: CPT | Mod: HCNC | Performed by: NURSE PRACTITIONER

## 2022-01-12 PROCEDURE — 80053 COMPREHEN METABOLIC PANEL: CPT | Mod: HCNC | Performed by: NURSE PRACTITIONER

## 2022-01-12 PROCEDURE — 37000008 HC ANESTHESIA 1ST 15 MINUTES: Mod: HCNC | Performed by: STUDENT IN AN ORGANIZED HEALTH CARE EDUCATION/TRAINING PROGRAM

## 2022-01-12 PROCEDURE — 71000033 HC RECOVERY, INTIAL HOUR: Mod: HCNC | Performed by: STUDENT IN AN ORGANIZED HEALTH CARE EDUCATION/TRAINING PROGRAM

## 2022-01-12 PROCEDURE — 10061 PR DRAIN SKIN ABSCESS COMPLIC: ICD-10-PCS | Mod: HCNC,,, | Performed by: STUDENT IN AN ORGANIZED HEALTH CARE EDUCATION/TRAINING PROGRAM

## 2022-01-12 PROCEDURE — 83735 ASSAY OF MAGNESIUM: CPT | Mod: HCNC | Performed by: NURSE PRACTITIONER

## 2022-01-12 PROCEDURE — 84100 ASSAY OF PHOSPHORUS: CPT | Mod: HCNC | Performed by: NURSE PRACTITIONER

## 2022-01-12 PROCEDURE — 87077 CULTURE AEROBIC IDENTIFY: CPT | Mod: HCNC | Performed by: STUDENT IN AN ORGANIZED HEALTH CARE EDUCATION/TRAINING PROGRAM

## 2022-01-12 PROCEDURE — 99900103 DSU ONLY-NO CHARGE-INITIAL HR (STAT): Mod: HCNC | Performed by: STUDENT IN AN ORGANIZED HEALTH CARE EDUCATION/TRAINING PROGRAM

## 2022-01-12 PROCEDURE — 25000003 PHARM REV CODE 250: Mod: HCNC | Performed by: ANESTHESIOLOGY

## 2022-01-12 PROCEDURE — 25000003 PHARM REV CODE 250: Mod: HCNC | Performed by: EMERGENCY MEDICINE

## 2022-01-12 PROCEDURE — 10061 I&D ABSCESS COMP/MULTIPLE: CPT | Mod: HCNC,,, | Performed by: STUDENT IN AN ORGANIZED HEALTH CARE EDUCATION/TRAINING PROGRAM

## 2022-01-12 PROCEDURE — 71000039 HC RECOVERY, EACH ADD'L HOUR: Mod: HCNC | Performed by: STUDENT IN AN ORGANIZED HEALTH CARE EDUCATION/TRAINING PROGRAM

## 2022-01-12 PROCEDURE — 87075 CULTR BACTERIA EXCEPT BLOOD: CPT | Mod: HCNC | Performed by: STUDENT IN AN ORGANIZED HEALTH CARE EDUCATION/TRAINING PROGRAM

## 2022-01-12 PROCEDURE — 63600175 PHARM REV CODE 636 W HCPCS: Mod: HCNC | Performed by: NURSE ANESTHETIST, CERTIFIED REGISTERED

## 2022-01-12 PROCEDURE — D9220A PRA ANESTHESIA: Mod: HCNC,ANES,, | Performed by: ANESTHESIOLOGY

## 2022-01-12 PROCEDURE — 99900104 DSU ONLY-NO CHARGE-EA ADD'L HR (STAT): Mod: HCNC | Performed by: STUDENT IN AN ORGANIZED HEALTH CARE EDUCATION/TRAINING PROGRAM

## 2022-01-12 PROCEDURE — D9220A PRA ANESTHESIA: ICD-10-PCS | Mod: HCNC,ANES,, | Performed by: ANESTHESIOLOGY

## 2022-01-12 PROCEDURE — 94761 N-INVAS EAR/PLS OXIMETRY MLT: CPT | Mod: HCNC

## 2022-01-12 PROCEDURE — 87186 SC STD MICRODIL/AGAR DIL: CPT | Mod: HCNC | Performed by: STUDENT IN AN ORGANIZED HEALTH CARE EDUCATION/TRAINING PROGRAM

## 2022-01-12 PROCEDURE — 25000003 PHARM REV CODE 250: Mod: HCNC | Performed by: NURSE ANESTHETIST, CERTIFIED REGISTERED

## 2022-01-12 RX ORDER — METOCLOPRAMIDE HYDROCHLORIDE 5 MG/ML
10 INJECTION INTRAMUSCULAR; INTRAVENOUS EVERY 10 MIN PRN
Status: DISCONTINUED | OUTPATIENT
Start: 2022-01-12 | End: 2022-01-12 | Stop reason: HOSPADM

## 2022-01-12 RX ORDER — OXYCODONE HYDROCHLORIDE 5 MG/1
5 TABLET ORAL
Status: DISCONTINUED | OUTPATIENT
Start: 2022-01-12 | End: 2022-01-12 | Stop reason: HOSPADM

## 2022-01-12 RX ORDER — ONDANSETRON HYDROCHLORIDE 2 MG/ML
INJECTION, SOLUTION INTRAMUSCULAR; INTRAVENOUS
Status: DISCONTINUED | OUTPATIENT
Start: 2022-01-12 | End: 2022-01-12

## 2022-01-12 RX ORDER — FENTANYL CITRATE 50 UG/ML
25 INJECTION, SOLUTION INTRAMUSCULAR; INTRAVENOUS EVERY 5 MIN PRN
Status: COMPLETED | OUTPATIENT
Start: 2022-01-12 | End: 2022-01-12

## 2022-01-12 RX ORDER — ROCURONIUM BROMIDE 10 MG/ML
INJECTION, SOLUTION INTRAVENOUS
Status: DISCONTINUED | OUTPATIENT
Start: 2022-01-12 | End: 2022-01-12

## 2022-01-12 RX ORDER — SUCCINYLCHOLINE CHLORIDE 20 MG/ML
INJECTION INTRAMUSCULAR; INTRAVENOUS
Status: DISCONTINUED | OUTPATIENT
Start: 2022-01-12 | End: 2022-01-12

## 2022-01-12 RX ORDER — SODIUM CHLORIDE 0.9 % (FLUSH) 0.9 %
10 SYRINGE (ML) INJECTION
Status: DISCONTINUED | OUTPATIENT
Start: 2022-01-12 | End: 2022-01-18 | Stop reason: HOSPADM

## 2022-01-12 RX ORDER — SODIUM CHLORIDE 0.9 % (FLUSH) 0.9 %
10 SYRINGE (ML) INJECTION
Status: DISCONTINUED | OUTPATIENT
Start: 2022-01-12 | End: 2022-01-12 | Stop reason: SDUPTHER

## 2022-01-12 RX ORDER — LIDOCAINE HCL/PF 100 MG/5ML
SYRINGE (ML) INTRAVENOUS
Status: DISCONTINUED | OUTPATIENT
Start: 2022-01-12 | End: 2022-01-12

## 2022-01-12 RX ORDER — PROPOFOL 10 MG/ML
VIAL (ML) INTRAVENOUS
Status: DISCONTINUED | OUTPATIENT
Start: 2022-01-12 | End: 2022-01-12

## 2022-01-12 RX ORDER — GABAPENTIN 300 MG/1
300 CAPSULE ORAL 3 TIMES DAILY
Status: DISCONTINUED | OUTPATIENT
Start: 2022-01-12 | End: 2022-01-18 | Stop reason: HOSPADM

## 2022-01-12 RX ORDER — DEXAMETHASONE SODIUM PHOSPHATE 4 MG/ML
INJECTION, SOLUTION INTRA-ARTICULAR; INTRALESIONAL; INTRAMUSCULAR; INTRAVENOUS; SOFT TISSUE
Status: DISCONTINUED | OUTPATIENT
Start: 2022-01-12 | End: 2022-01-12

## 2022-01-12 RX ORDER — LORAZEPAM 0.5 MG/1
0.5 TABLET ORAL EVERY 6 HOURS PRN
Status: DISCONTINUED | OUTPATIENT
Start: 2022-01-12 | End: 2022-01-15

## 2022-01-12 RX ORDER — FENTANYL CITRATE 50 UG/ML
INJECTION, SOLUTION INTRAMUSCULAR; INTRAVENOUS
Status: DISCONTINUED | OUTPATIENT
Start: 2022-01-12 | End: 2022-01-12

## 2022-01-12 RX ORDER — HYDROMORPHONE HYDROCHLORIDE 2 MG/ML
0.2 INJECTION, SOLUTION INTRAMUSCULAR; INTRAVENOUS; SUBCUTANEOUS EVERY 5 MIN PRN
Status: DISCONTINUED | OUTPATIENT
Start: 2022-01-12 | End: 2022-01-12 | Stop reason: HOSPADM

## 2022-01-12 RX ORDER — MIDAZOLAM HYDROCHLORIDE 1 MG/ML
INJECTION INTRAMUSCULAR; INTRAVENOUS
Status: DISCONTINUED | OUTPATIENT
Start: 2022-01-12 | End: 2022-01-12

## 2022-01-12 RX ADMIN — ONDANSETRON 4 MG: 2 INJECTION, SOLUTION INTRAMUSCULAR; INTRAVENOUS at 01:01

## 2022-01-12 RX ADMIN — MORPHINE SULFATE 4 MG: 4 INJECTION INTRAVENOUS at 10:01

## 2022-01-12 RX ADMIN — POTASSIUM BICARBONATE 35 MEQ: 391 TABLET, EFFERVESCENT ORAL at 12:01

## 2022-01-12 RX ADMIN — FENTANYL CITRATE 25 MCG: 50 INJECTION INTRAMUSCULAR; INTRAVENOUS at 02:01

## 2022-01-12 RX ADMIN — SUCCINYLCHOLINE CHLORIDE 180 MG: 20 INJECTION, SOLUTION INTRAMUSCULAR; INTRAVENOUS; PARENTERAL at 01:01

## 2022-01-12 RX ADMIN — Medication 800 MG: at 06:01

## 2022-01-12 RX ADMIN — HYDROCODONE BITARTRATE AND ACETAMINOPHEN 1 TABLET: 5; 325 TABLET ORAL at 02:01

## 2022-01-12 RX ADMIN — SODIUM CHLORIDE: 0.9 INJECTION, SOLUTION INTRAVENOUS at 02:01

## 2022-01-12 RX ADMIN — FLUOXETINE 20 MG: 20 CAPSULE ORAL at 08:01

## 2022-01-12 RX ADMIN — ROCURONIUM BROMIDE 5 MG: 10 INJECTION, SOLUTION INTRAVENOUS at 01:01

## 2022-01-12 RX ADMIN — HYDROCODONE BITARTRATE AND ACETAMINOPHEN 1 TABLET: 5; 325 TABLET ORAL at 06:01

## 2022-01-12 RX ADMIN — MIDAZOLAM HYDROCHLORIDE 2 MG: 1 INJECTION, SOLUTION INTRAMUSCULAR; INTRAVENOUS at 01:01

## 2022-01-12 RX ADMIN — ALUMINUM HYDROXIDE, MAGNESIUM HYDROXIDE, AND SIMETHICONE 5 ML: 200; 200; 20 SUSPENSION ORAL at 12:01

## 2022-01-12 RX ADMIN — OXYCODONE HYDROCHLORIDE 5 MG: 5 TABLET ORAL at 02:01

## 2022-01-12 RX ADMIN — FENTANYL CITRATE 100 MCG: 50 INJECTION, SOLUTION INTRAMUSCULAR; INTRAVENOUS at 01:01

## 2022-01-12 RX ADMIN — CLINDAMYCIN IN 5 PERCENT DEXTROSE 600 MG: 12 INJECTION, SOLUTION INTRAVENOUS at 02:01

## 2022-01-12 RX ADMIN — LAMOTRIGINE 50 MG: 25 TABLET ORAL at 08:01

## 2022-01-12 RX ADMIN — GABAPENTIN 300 MG: 300 CAPSULE ORAL at 05:01

## 2022-01-12 RX ADMIN — CLINDAMYCIN IN 5 PERCENT DEXTROSE 600 MG: 12 INJECTION, SOLUTION INTRAVENOUS at 10:01

## 2022-01-12 RX ADMIN — LIDOCAINE HYDROCHLORIDE 100 MG: 20 INJECTION INTRAVENOUS at 01:01

## 2022-01-12 RX ADMIN — POTASSIUM & SODIUM PHOSPHATES POWDER PACK 280-160-250 MG 2 PACKET: 280-160-250 PACK at 06:01

## 2022-01-12 RX ADMIN — DEXAMETHASONE SODIUM PHOSPHATE 4 MG: 4 INJECTION, SOLUTION INTRA-ARTICULAR; INTRALESIONAL; INTRAMUSCULAR; INTRAVENOUS; SOFT TISSUE at 01:01

## 2022-01-12 RX ADMIN — MORPHINE SULFATE 4 MG: 4 INJECTION INTRAVENOUS at 09:01

## 2022-01-12 RX ADMIN — PROPOFOL 170 MG: 10 INJECTION, EMULSION INTRAVENOUS at 01:01

## 2022-01-12 RX ADMIN — BACLOFEN 10 MG: 10 TABLET ORAL at 08:01

## 2022-01-12 RX ADMIN — GABAPENTIN 300 MG: 300 CAPSULE ORAL at 08:01

## 2022-01-12 RX ADMIN — SODIUM CHLORIDE, SODIUM GLUCONATE, SODIUM ACETATE, POTASSIUM CHLORIDE, MAGNESIUM CHLORIDE, SODIUM PHOSPHATE, DIBASIC, AND POTASSIUM PHOSPHATE: .53; .5; .37; .037; .03; .012; .00082 INJECTION, SOLUTION INTRAVENOUS at 11:01

## 2022-01-12 RX ADMIN — MORPHINE SULFATE 4 MG: 4 INJECTION INTRAVENOUS at 04:01

## 2022-01-12 NOTE — ANESTHESIA PREPROCEDURE EVALUATION
01/12/2022  Jamila Lee is a 46 y.o., female.    Pre-op Assessment    I have reviewed the Patient Summary Reports.     I have reviewed the Nursing Notes. I have reviewed the NPO Status.   I have reviewed the Medications.     Review of Systems  Anesthesia Hx:  No problems with previous Anesthesia  Denies Family Hx of Anesthesia complications.   Denies Personal Hx of Anesthesia complications.   Social:  Smoker    Cardiovascular:   Hypertension hyperlipidemia    Pulmonary:   Asthma    Musculoskeletal:   Arthritis   Spine Disorders:    Neurological:   Chronic Pain Syndrome (methadone 10 mg BID, Dilaudid 4 mg QID, Valium 10 mg)   Endocrine:   Hypothyroidism    Dermatological:   Spider bite buttocks with cellulitis and abscess   Psych:   Psychiatric History Intentional overdose in past         Physical Exam  General:  Well nourished    Airway/Jaw/Neck:  Airway Findings: Mouth Opening: Normal Tongue: Normal  General Airway Assessment: Adult, Average  Mallampati: II  Jaw/Neck Findings:  Neck ROM: Normal ROM       Chest/Lungs:  Chest/Lungs Findings: Normal Respiratory Rate     Heart/Vascular:  Heart Findings: Rate: Normal  Rhythm: Regular Rhythm        Mental Status:  Mental Status Findings:  Cooperative, Alert and Oriented         Anesthesia Plan  Type of Anesthesia, risks & benefits discussed:  Anesthesia Type:  general    Patient's Preference:   Plan Factors:          Intra-op Monitoring Plan: standard ASA monitors  Intra-op Monitoring Plan Comments:   Post Op Pain Control Plan: multimodal analgesia  Post Op Pain Control Plan Comments:     Induction:   IV  Beta Blocker:  Patient is not currently on a Beta-Blocker (No further documentation required).       Informed Consent: Patient understands risks and agrees with Anesthesia plan.  Questions answered. Anesthesia consent signed with patient.  ASA Score: 3     Day  of Surgery Review of History & Physical:    H&P update referred to the surgeon.         Ready For Surgery From Anesthesia Perspective.

## 2022-01-12 NOTE — ANESTHESIA POSTPROCEDURE EVALUATION
Anesthesia Post Evaluation    Patient: Jamila Lee    Procedure(s) Performed: Procedure(s) (LRB):  INCISION AND DRAINAGE, ABSCESS AND DRAIN PLACEMENT (Left)    Final Anesthesia Type: general      Patient location during evaluation: PACU  Patient participation: Yes- Able to Participate  Level of consciousness: awake and alert  Post-procedure vital signs: reviewed and stable  Pain management: adequate  Airway patency: patent    PONV status at discharge: No PONV  Anesthetic complications: no      Cardiovascular status: blood pressure returned to baseline  Respiratory status: unassisted  Hydration status: euvolemic  Follow-up not needed.          Vitals Value Taken Time   /86 01/12/22 1457   Temp 37.2 °C (98.9 °F) 01/12/22 1457   Pulse 79 01/12/22 1457   Resp 18 01/12/22 1457   SpO2 97 % 01/12/22 1457         Event Time   Out of Recovery 01/12/2022 14:50:43         Pain/Ayse Score: Pain Rating Prior to Med Admin: 6 (1/12/2022  2:40 PM)  Pain Rating Post Med Admin: 4 (1/12/2022  2:45 PM)  Ayse Score: 9 (1/12/2022  2:45 PM)
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
## 2022-01-12 NOTE — OP NOTE
[facility]  Surgery Department  Operative Note    SUMMARY     Date of Procedure: 1/12/2022     Procedure: Procedure(s) (LRB):  INCISION AND DRAINAGE, ABSCESS AND DRAIN PLACEMENT (Left)     Surgeon(s) and Role:     * Erwin Hays MD - Primary    Assisting Surgeon: None    Pre-Operative Diagnosis: Cellulitis [L03.90]  Cellulitis of buttock [L03.317]    Post-Operative Diagnosis: Post-Op Diagnosis Codes:     * Cellulitis [L03.90]     * Cellulitis of buttock [L03.317]    Anesthesia: General    Operative Findings (including complications, if any):  Large left buttock abscess.  I and D with drain placement    Description of Technical Procedures:  This is a 46-year-old female who presented with left buttock pain.  Imaging was consistent with a phlegmon.  Exam was consistent with abscess.  She was taken to the OR for I and D drain placement.  She did consent to the procedure.  She was taken to the OR, placed supine, general endotracheal anesthesia was induced, she was prone, the left buttock was prepped and draped in the usual fashion, a time-out was completed.  An incision was made over an area of maximal fluctuance in the left buttock.  There was copious expression of pus.  Cultures were taken.  The wound was bluntly probed.  There was expansive tunneling under the skin.  A counter incision was made approximately the 4 cm laterally at the lateral extent of the tunneling.  A Penrose drain was threaded between the 2 incisions and secured to itself using a 2-0 nylon suture.  The wounds were copiously irrigated.  The wounds were then gently packed with Betadine-soaked gauze.  Dressing was applied.  Patient tolerated the entire procedure without apparent complication, was extubated in the OR, brought to recovery in stable condition.  All counts were correct.    Significant Surgical Tasks Conducted by the Assistant(s), if Applicable: Assist    Estimated Blood Loss (EBL): min           Implants: * No  implants in log *    Specimens:   Cultures    Condition: Good    Disposition: PACU - hemodynamically stable.    Attestation: I was present and scrubbed for the entire procedure.

## 2022-01-12 NOTE — NURSING
Patient back from pacu, VSS, penrose drain and packing in place to left buttock. Will continue to monitor.

## 2022-01-12 NOTE — PLAN OF CARE
Transported pt. From Rm 415 to preop bed 4 via pt's bed. Report from MARLEE Gleason. Pt. C/o pain to left buttock. Dressing and brief in place. IV to left forearm flushes easily. Call light in hand. Denies any further needs.

## 2022-01-12 NOTE — ANESTHESIA PROCEDURE NOTES
Intubation    Date/Time: 1/12/2022 1:25 PM  Performed by: Qamar Jhaveri CRNA  Authorized by: Germain Page MD     Intubation:     Induction:  Intravenous    Intubated:  Postinduction    Mask Ventilation:  Easy mask    Attempts:  1    Attempted By:  CRNA    Method of Intubation:  Video laryngoscopy    Blade:  Leiva 3    Laryngeal View Grade: Grade I - full view of cords      Difficult Airway Encountered?: No      Complications:  None    Airway Device:  Oral endotracheal tube    Airway Device Size:  7.0    Style/Cuff Inflation:  Cuffed (inflated to minimal occlusive pressure)    Inflation Amount (mL):  5    Tube secured:  21    Secured at:  The lips    Placement Verified By:  Capnometry    Complicating Factors:  None    Findings Post-Intubation:  BS equal bilateral and atraumatic/condition of teeth unchanged
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
## 2022-01-12 NOTE — PLAN OF CARE
POC discussed with pt, pt verbalized understanding. Oriented x4. PIV cdi, flushed. Potassium 3.2, replaced. Wound to left buttock red and swollen, dressing applied, large amount of bloody drainage noted. Pt complaining of severe pain to the buttock area, made worse by everything including air, medicated with prn's, pt reports relief with morphine. Lesion to tongue also causing pain, magic mouthwash administered per orders. Ambulated with 1 person assist to the restroom. Call light in reach, bed alarm set, safety maintained. No complaints or requests at this time, will continue to monitor.

## 2022-01-12 NOTE — SUBJECTIVE & OBJECTIVE
Interval History: Notes reviewed, no acute events overnight. Pt reports signficant pain to her left buttocks which she describes as burning quality, 10/10 intensity, worsens with palpation or movement, improves with pain medication. Pts IV needs to be replaced and she is awaiting IV pain meds. Will adjust oral medication as well for better pain control. I&D site with penrose drain intact and wound is very indurated with signficant cellulitis. D/w gen surgery and will keep on IV abx and keep in hospital for further monitoring and wound care. Pt verbalized understanding and appreciative of care.      Review of Systems   Constitutional: Negative for activity change, appetite change, chills, diaphoresis, fatigue and fever.   HENT: Negative for congestion, postnasal drip and sore throat.    Respiratory: Negative for cough and shortness of breath.    Cardiovascular: Negative for chest pain and palpitations.   Gastrointestinal: Negative for abdominal pain, constipation, diarrhea, nausea and vomiting.   Genitourinary: Negative for dysuria.   Musculoskeletal: Negative for arthralgias, back pain and myalgias.   Skin: Positive for color change and wound.   Neurological: Negative for dizziness, weakness and headaches.   Psychiatric/Behavioral: Negative for agitation, behavioral problems and confusion.   All other systems reviewed and are negative.    Objective:     Vital Signs (Most Recent):  Temp: 97.5 °F (36.4 °C) (01/12/22 0744)  Pulse: 95 (01/12/22 0744)  Resp: 20 (01/12/22 1009)  BP: 119/60 (01/12/22 0744)  SpO2: 99 % (01/12/22 0744) Vital Signs (24h Range):  Temp:  [96.1 °F (35.6 °C)-98.5 °F (36.9 °C)] 97.5 °F (36.4 °C)  Pulse:  [] 95  Resp:  [14-24] 20  SpO2:  [96 %-100 %] 99 %  BP: (119-165)/(60-99) 119/60     Weight: 67.5 kg (148 lb 13 oz)  Body mass index is 25.54 kg/m².    Intake/Output Summary (Last 24 hours) at 1/12/2022 1046  Last data filed at 1/12/2022 0620  Gross per 24 hour   Intake 613.38 ml   Output --    Net 613.38 ml      Physical Exam  Vitals and nursing note reviewed.   Constitutional:       General: She is not in acute distress.     Appearance: Normal appearance. She is not ill-appearing or diaphoretic.   HENT:      Head: Normocephalic.      Nose: Nose normal. No congestion or rhinorrhea.      Mouth/Throat:      Pharynx: Oropharynx is clear. No oropharyngeal exudate.   Eyes:      General: No scleral icterus.     Conjunctiva/sclera: Conjunctivae normal.   Cardiovascular:      Rate and Rhythm: Normal rate and regular rhythm.      Pulses: Normal pulses.      Heart sounds: Normal heart sounds. No murmur heard.      Pulmonary:      Effort: Pulmonary effort is normal. No respiratory distress.      Breath sounds: Normal breath sounds. No stridor. No wheezing, rhonchi or rales.   Abdominal:      General: Bowel sounds are normal. There is no distension.      Palpations: Abdomen is soft.      Tenderness: There is no abdominal tenderness. There is no guarding or rebound.   Skin:     General: Skin is warm.      Capillary Refill: Capillary refill takes less than 2 seconds.      Findings: Erythema present.      Comments: Large wound to left buttocks - penrose drain intact with moderate amount of serosangeous drainage. Bullous cellulitis present with significant induration and very TTP.    Neurological:      General: No focal deficit present.      Mental Status: She is alert and oriented to person, place, and time.      Cranial Nerves: No cranial nerve deficit.      Motor: No weakness.   Psychiatric:         Mood and Affect: Mood normal.         Behavior: Behavior normal.         Significant Labs:   All pertinent labs within the past 24 hours have been reviewed.  CBC:   Recent Labs   Lab 01/11/22  1403 01/13/22  0434   WBC 9.98 12.97*   HGB 11.4* 10.7*   HCT 33.6* 32.7*   * 554*     CMP:   Recent Labs   Lab 01/11/22  1403 01/12/22  0303 01/13/22  0434    137 136   K 3.2* 4.5 4.0    102 100   CO2 22* 26 27     84 135*   BUN 10 9 13   CREATININE 0.8 0.7 0.8   CALCIUM 10.1 9.1 9.3   PROT 6.9 6.0 5.7*   ALBUMIN 3.1* 2.7* 2.5*   BILITOT 0.4 0.2 0.1   ALKPHOS 126 165* 147*   AST 24 66* 24   ALT 53* 79* 54*   ANIONGAP 14 9 9   EGFRNONAA >60 >60 >60       Significant Imaging: I have reviewed all pertinent imaging results/findings within the past 24 hours.

## 2022-01-12 NOTE — PLAN OF CARE
Patient is stable at this time.  VSS. Anesthesiologist at patient's bedside and is ok for patient to transfer to the floor.  Dressings clean, dry and intact to left buttocks.  Pain is a 4/10.  No complaints of nausea or vomiting.  Patient tolerating po intake well.
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
## 2022-01-12 NOTE — TRANSFER OF CARE
"Anesthesia Transfer of Care Note    Patient: Jamila Lee    Procedure(s) Performed: Procedure(s) (LRB):  INCISION AND DRAINAGE, ABSCESS AND DRAIN PLACEMENT (Left)    Patient location: PACU    Anesthesia Type: general    Transport from OR: Transported from OR on 2-3 L/min O2 by NC with adequate spontaneous ventilation    Post pain: adequate analgesia    Post assessment: no apparent anesthetic complications and tolerated procedure well    Post vital signs: stable    Level of consciousness: awake, alert and oriented    Nausea/Vomiting: no nausea/vomiting    Complications: none    Transfer of care protocol was followed      Last vitals:   Visit Vitals  /67 (BP Location: Right arm, Patient Position: Lying)   Pulse 71   Temp 36.2 °C (97.1 °F) (Temporal)   Resp 16   Ht 5' 4" (1.626 m)   Wt 67.5 kg (148 lb 13 oz)   SpO2 99%   Breastfeeding No   BMI 25.54 kg/m²     "

## 2022-01-12 NOTE — PLAN OF CARE
Patient underwent uneventful I and D. Recommend continued observation with IV antibiotics tonight.  Will plan on removing packing tomorrow.  If she is hemodynamically stable, likely okay to DC tomorrow with outpatient follow-up.

## 2022-01-12 NOTE — PLAN OF CARE
[facility]  Initial Discharge Assessment       Primary Care Provider: Germaine Wesley NP    Admission Diagnosis: Cellulitis [L03.90]  Cellulitis, unspecified cellulitis site [L03.90]    Admission Date: 1/11/2022  Expected Discharge Date: to be determined    SW met with pt at bedside to complete discharge assessment, verified PCP, pharmacy and information on facesheet.  Pt lives with parents and is independent with ADLs and drives self.  Pt had SC, rollator, nebulizer, WC, quad cane.  No HH, dialysis and doesn't take coumadin.  Pt will drive self home.  Pt requested home delivery meals.  No other needs requested at this time.    Discharge Barriers Identified: None    Payor: [employer] MEDICARE / Plan: [employer] MEDICARE HMO / Product Type: Capitation /     Extended Emergency Contact Information  Primary Emergency Contact: BRENDA BARILLAS [place] [address] [place]  Work Phone: [phone]  Mobile Phone: [phone]  Relation: Son  Preferred language: English   needed? No  Secondary Emergency Contact: Rashad Barillas   [place]  Mobile Phone: [phone]  Relation: Significant other    Discharge Plan A: Home  Discharge Plan B: Home      [id number] [address] AT [facility] & [address] [address]  Phone: [phone] Fax: [phone]      Initial Assessment (most recent)     Adult Discharge Assessment - 01/12/22 1147        Discharge Assessment    Assessment Type Discharge Planning Assessment     Confirmed/corrected address, phone number and insurance Yes     Confirmed Demographics Correct on Facesheet     Source of Information patient     Does patient/caregiver understand observation status Yes     Communicated RICKEY with patient/caregiver No     Lives With parent(s)     Do you expect to return to your current living situation? Yes     Prior to hospitilization  "cognitive status: Alert/Oriented     Current cognitive status: Alert/Oriented     Walking or Climbing Stairs Difficulty ambulation difficulty, requires equipment     Mobility Management wheelchair, rollator or cane, depends on how pt feels     Dressing/Bathing Difficulty none     Home Accessibility wheelchair accessible     Home Layout Able to live on 1st floor     Equipment Currently Used at Home shower chair;rollator;nebulizer;wheelchair;cane, quad     Readmission within 30 days? No     Patient currently being followed by outpatient case management? No     Do you currently have service(s) that help you manage your care at home? No     Do you take prescription medications? Yes     Do you have prescription coverage? Yes     Coverage [employer]     Do you have any problems affording any of your prescribed medications? No     Is the patient taking medications as prescribed? no     If no, which medications is patient not taking? "I forget"     How do you get to doctors appointments? car, drives self     Are you on dialysis? No     Do you take coumadin? No     Discharge Plan A Home     Discharge Plan B Home     DME Needed Upon Discharge  none     Discharge Plan discussed with: Patient     Discharge Barriers Identified None                          "

## 2022-01-13 PROBLEM — L02.31 CELLULITIS AND ABSCESS OF BUTTOCK: Status: ACTIVE | Noted: 2022-01-11

## 2022-01-13 LAB
ALBUMIN SERPL BCP-MCNC: 2.5 G/DL (ref 3.5–5.2)
ALP SERPL-CCNC: 147 U/L (ref 55–135)
ALT SERPL W/O P-5'-P-CCNC: 54 U/L (ref 10–44)
ANION GAP SERPL CALC-SCNC: 9 MMOL/L (ref 8–16)
AST SERPL-CCNC: 24 U/L (ref 10–40)
BASOPHILS # BLD AUTO: 0.05 K/UL (ref 0–0.2)
BASOPHILS NFR BLD: 0.4 % (ref 0–1.9)
BILIRUB SERPL-MCNC: 0.1 MG/DL (ref 0.1–1)
BUN SERPL-MCNC: 13 MG/DL (ref 6–20)
CALCIUM SERPL-MCNC: 9.3 MG/DL (ref 8.7–10.5)
CHLORIDE SERPL-SCNC: 100 MMOL/L (ref 95–110)
CO2 SERPL-SCNC: 27 MMOL/L (ref 23–29)
CREAT SERPL-MCNC: 0.8 MG/DL (ref 0.5–1.4)
DIFFERENTIAL METHOD: ABNORMAL
EOSINOPHIL # BLD AUTO: 0.1 K/UL (ref 0–0.5)
EOSINOPHIL NFR BLD: 0.5 % (ref 0–8)
ERYTHROCYTE [DISTWIDTH] IN BLOOD BY AUTOMATED COUNT: 12.7 % (ref 11.5–14.5)
EST. GFR  (AFRICAN AMERICAN): >60 ML/MIN/1.73 M^2
EST. GFR  (NON AFRICAN AMERICAN): >60 ML/MIN/1.73 M^2
GLUCOSE SERPL-MCNC: 135 MG/DL (ref 70–110)
HCT VFR BLD AUTO: 32.7 % (ref 37–48.5)
HGB BLD-MCNC: 10.7 G/DL (ref 12–16)
IMM GRANULOCYTES # BLD AUTO: 0.31 K/UL (ref 0–0.04)
IMM GRANULOCYTES NFR BLD AUTO: 2.4 % (ref 0–0.5)
LYMPHOCYTES # BLD AUTO: 3.5 K/UL (ref 1–4.8)
LYMPHOCYTES NFR BLD: 26.9 % (ref 18–48)
MAGNESIUM SERPL-MCNC: 2 MG/DL (ref 1.6–2.6)
MCH RBC QN AUTO: 30 PG (ref 27–31)
MCHC RBC AUTO-ENTMCNC: 32.7 G/DL (ref 32–36)
MCV RBC AUTO: 92 FL (ref 82–98)
MONOCYTES # BLD AUTO: 0.8 K/UL (ref 0.3–1)
MONOCYTES NFR BLD: 6.2 % (ref 4–15)
NEUTROPHILS # BLD AUTO: 8.3 K/UL (ref 1.8–7.7)
NEUTROPHILS NFR BLD: 63.6 % (ref 38–73)
NRBC BLD-RTO: 0 /100 WBC
PHOSPHATE SERPL-MCNC: 3.2 MG/DL (ref 2.7–4.5)
PLATELET # BLD AUTO: 554 K/UL (ref 150–450)
PMV BLD AUTO: 9.7 FL (ref 9.2–12.9)
POTASSIUM SERPL-SCNC: 4 MMOL/L (ref 3.5–5.1)
PROT SERPL-MCNC: 5.7 G/DL (ref 6–8.4)
RBC # BLD AUTO: 3.57 M/UL (ref 4–5.4)
SODIUM SERPL-SCNC: 136 MMOL/L (ref 136–145)
WBC # BLD AUTO: 12.97 K/UL (ref 3.9–12.7)

## 2022-01-13 PROCEDURE — 83735 ASSAY OF MAGNESIUM: CPT | Mod: HCNC | Performed by: NURSE PRACTITIONER

## 2022-01-13 PROCEDURE — 84100 ASSAY OF PHOSPHORUS: CPT | Mod: HCNC | Performed by: NURSE PRACTITIONER

## 2022-01-13 PROCEDURE — 85025 COMPLETE CBC W/AUTO DIFF WBC: CPT | Mod: HCNC | Performed by: NURSE PRACTITIONER

## 2022-01-13 PROCEDURE — 36415 COLL VENOUS BLD VENIPUNCTURE: CPT | Mod: HCNC | Performed by: NURSE PRACTITIONER

## 2022-01-13 PROCEDURE — 25000003 PHARM REV CODE 250: Mod: HCNC | Performed by: NURSE PRACTITIONER

## 2022-01-13 PROCEDURE — 63600175 PHARM REV CODE 636 W HCPCS: Mod: HCNC | Performed by: NURSE PRACTITIONER

## 2022-01-13 PROCEDURE — 12000002 HC ACUTE/MED SURGE SEMI-PRIVATE ROOM: Mod: HCNC

## 2022-01-13 PROCEDURE — 94761 N-INVAS EAR/PLS OXIMETRY MLT: CPT | Mod: HCNC

## 2022-01-13 PROCEDURE — 80053 COMPREHEN METABOLIC PANEL: CPT | Mod: HCNC | Performed by: NURSE PRACTITIONER

## 2022-01-13 RX ORDER — MORPHINE SULFATE 4 MG/ML
4 INJECTION, SOLUTION INTRAMUSCULAR; INTRAVENOUS EVERY 4 HOURS PRN
Status: DISCONTINUED | OUTPATIENT
Start: 2022-01-13 | End: 2022-01-16

## 2022-01-13 RX ORDER — MORPHINE SULFATE 4 MG/ML
4 INJECTION, SOLUTION INTRAMUSCULAR; INTRAVENOUS ONCE
Status: COMPLETED | OUTPATIENT
Start: 2022-01-13 | End: 2022-01-13

## 2022-01-13 RX ORDER — OXYCODONE AND ACETAMINOPHEN 5; 325 MG/1; MG/1
1 TABLET ORAL EVERY 4 HOURS PRN
Status: DISCONTINUED | OUTPATIENT
Start: 2022-01-13 | End: 2022-01-16

## 2022-01-13 RX ADMIN — GABAPENTIN 300 MG: 300 CAPSULE ORAL at 02:01

## 2022-01-13 RX ADMIN — LORAZEPAM 0.5 MG: 0.5 TABLET ORAL at 03:01

## 2022-01-13 RX ADMIN — Medication 1 EACH: at 08:01

## 2022-01-13 RX ADMIN — GABAPENTIN 300 MG: 300 CAPSULE ORAL at 08:01

## 2022-01-13 RX ADMIN — MORPHINE SULFATE 4 MG: 4 INJECTION INTRAVENOUS at 10:01

## 2022-01-13 RX ADMIN — LORAZEPAM 0.5 MG: 0.5 TABLET ORAL at 09:01

## 2022-01-13 RX ADMIN — HYDROCODONE BITARTRATE AND ACETAMINOPHEN 1 TABLET: 5; 325 TABLET ORAL at 09:01

## 2022-01-13 RX ADMIN — LEVOTHYROXINE SODIUM 150 MCG: 150 TABLET ORAL at 08:01

## 2022-01-13 RX ADMIN — FLUOXETINE 20 MG: 20 CAPSULE ORAL at 08:01

## 2022-01-13 RX ADMIN — LAMOTRIGINE 50 MG: 25 TABLET ORAL at 08:01

## 2022-01-13 RX ADMIN — MORPHINE SULFATE 4 MG: 4 INJECTION INTRAVENOUS at 12:01

## 2022-01-13 RX ADMIN — OXYCODONE HYDROCHLORIDE AND ACETAMINOPHEN 1 TABLET: 5; 325 TABLET ORAL at 11:01

## 2022-01-13 RX ADMIN — MORPHINE SULFATE 4 MG: 4 INJECTION INTRAVENOUS at 09:01

## 2022-01-13 RX ADMIN — ATORVASTATIN CALCIUM 10 MG: 10 TABLET, FILM COATED ORAL at 08:01

## 2022-01-13 RX ADMIN — CLINDAMYCIN IN 5 PERCENT DEXTROSE 600 MG: 12 INJECTION, SOLUTION INTRAVENOUS at 05:01

## 2022-01-13 RX ADMIN — OXYCODONE HYDROCHLORIDE AND ACETAMINOPHEN 1 TABLET: 5; 325 TABLET ORAL at 08:01

## 2022-01-13 RX ADMIN — MORPHINE SULFATE 4 MG: 4 INJECTION INTRAVENOUS at 05:01

## 2022-01-13 RX ADMIN — CLINDAMYCIN IN 5 PERCENT DEXTROSE 600 MG: 12 INJECTION, SOLUTION INTRAVENOUS at 02:01

## 2022-01-13 RX ADMIN — CLINDAMYCIN IN 5 PERCENT DEXTROSE 600 MG: 12 INJECTION, SOLUTION INTRAVENOUS at 11:01

## 2022-01-13 RX ADMIN — BACLOFEN 10 MG: 10 TABLET ORAL at 08:01

## 2022-01-13 NOTE — PLAN OF CARE
Plan of care reviewed with patient. Patient verbalized complete understanding. Pt awake, alert, and oriented. Pt complaining of pain but controlled with PRN pain medication. No IV access at start of shift but able to start an IV in right upper arm. Pt IV clean dry and intact. Antibiotics administered as ordered.  All fall precautions maintained, bed in lowest position, locked, call light within reach. Side rails up times 2. Slip resistant socks maintained.

## 2022-01-13 NOTE — ASSESSMENT & PLAN NOTE
Patient has hypokalemia which is currently controlled. Last electrolytes reviewed-   Recent Labs   Lab 01/11/22  1403 01/12/22  0303 01/13/22  0434   K 3.2* 4.5 4.0   . Will replace potassium and monitor electrolytes closely.

## 2022-01-13 NOTE — CARE UPDATE
01/13/22 0738   Patient Assessment/Suction   Level of Consciousness (AVPU) alert   All Lung Fields Breath Sounds clear   PRE-TX-O2   O2 Device (Oxygen Therapy) room air   SpO2 97 %   Pulse Oximetry Type Intermittent   $ Pulse Oximetry - Multiple Charge Pulse Oximetry - Multiple   Pulse 80   Resp 18

## 2022-01-13 NOTE — ASSESSMENT & PLAN NOTE
S/p I&D left buttocks with Dr. Hays 1/12/22 - wound very indurated with bullous cellulitis present, penrose drain intact  Cont IV clindamycin  Trend CBC  Consult wound care  Consider ID consult if not improving

## 2022-01-13 NOTE — CARE UPDATE
01/13/22 0234   PRE-TX-O2   O2 Device (Oxygen Therapy) room air   SpO2 97 %   Pulse Oximetry Type Intermittent

## 2022-01-13 NOTE — PLAN OF CARE
Plan of care continues; packing and penrose drain in place to left buttock. Patient complaints of pain throughout shift. Alert and oriented x4. Ambulatory with standby. Safety maintained, bed wheels locked, bed in lowest position, call light in reach.
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
## 2022-01-14 ENCOUNTER — OUTSIDE PLACE OF SERVICE (OUTPATIENT)
Dept: INFECTIOUS DISEASES | Facility: CLINIC | Age: 47
End: 2022-01-14
Payer: MEDICARE

## 2022-01-14 DIAGNOSIS — L02.31 CELLULITIS AND ABSCESS OF BUTTOCK: ICD-10-CM

## 2022-01-14 DIAGNOSIS — L03.317 CELLULITIS AND ABSCESS OF BUTTOCK: ICD-10-CM

## 2022-01-14 PROBLEM — E87.6 HYPOKALEMIA: Status: RESOLVED | Noted: 2019-07-23 | Resolved: 2022-01-14

## 2022-01-14 LAB
ALBUMIN SERPL BCP-MCNC: 2.6 G/DL (ref 3.5–5.2)
ALP SERPL-CCNC: 131 U/L (ref 55–135)
ALT SERPL W/O P-5'-P-CCNC: 50 U/L (ref 10–44)
ANION GAP SERPL CALC-SCNC: 10 MMOL/L (ref 8–16)
AST SERPL-CCNC: 26 U/L (ref 10–40)
BACTERIA #/AREA URNS HPF: ABNORMAL /HPF
BASOPHILS # BLD AUTO: 0.12 K/UL (ref 0–0.2)
BASOPHILS NFR BLD: 0.9 % (ref 0–1.9)
BILIRUB SERPL-MCNC: 0.1 MG/DL (ref 0.1–1)
BILIRUB UR QL STRIP: NEGATIVE
BUN SERPL-MCNC: 11 MG/DL (ref 6–20)
CALCIUM SERPL-MCNC: 9.3 MG/DL (ref 8.7–10.5)
CHLORIDE SERPL-SCNC: 99 MMOL/L (ref 95–110)
CK SERPL-CCNC: 137 U/L (ref 20–180)
CLARITY UR: CLEAR
CO2 SERPL-SCNC: 26 MMOL/L (ref 23–29)
COLOR UR: YELLOW
CREAT SERPL-MCNC: 0.8 MG/DL (ref 0.5–1.4)
CRP SERPL-MCNC: 33.4 MG/L (ref 0–8.2)
DIFFERENTIAL METHOD: ABNORMAL
EOSINOPHIL # BLD AUTO: 0.3 K/UL (ref 0–0.5)
EOSINOPHIL NFR BLD: 2 % (ref 0–8)
ERYTHROCYTE [DISTWIDTH] IN BLOOD BY AUTOMATED COUNT: 12.6 % (ref 11.5–14.5)
EST. GFR  (AFRICAN AMERICAN): >60 ML/MIN/1.73 M^2
EST. GFR  (NON AFRICAN AMERICAN): >60 ML/MIN/1.73 M^2
GLUCOSE SERPL-MCNC: 85 MG/DL (ref 70–110)
GLUCOSE UR QL STRIP: NEGATIVE
HCT VFR BLD AUTO: 32.6 % (ref 37–48.5)
HGB BLD-MCNC: 10.8 G/DL (ref 12–16)
HGB UR QL STRIP: ABNORMAL
IMM GRANULOCYTES # BLD AUTO: 1.17 K/UL (ref 0–0.04)
IMM GRANULOCYTES NFR BLD AUTO: 8.7 % (ref 0–0.5)
KETONES UR QL STRIP: NEGATIVE
LEUKOCYTE ESTERASE UR QL STRIP: NEGATIVE
LYMPHOCYTES # BLD AUTO: 5.5 K/UL (ref 1–4.8)
LYMPHOCYTES NFR BLD: 40.9 % (ref 18–48)
MCH RBC QN AUTO: 30.3 PG (ref 27–31)
MCHC RBC AUTO-ENTMCNC: 33.1 G/DL (ref 32–36)
MCV RBC AUTO: 92 FL (ref 82–98)
MICROSCOPIC COMMENT: ABNORMAL
MONOCYTES # BLD AUTO: 0.8 K/UL (ref 0.3–1)
MONOCYTES NFR BLD: 6 % (ref 4–15)
NEUTROPHILS # BLD AUTO: 5.6 K/UL (ref 1.8–7.7)
NEUTROPHILS NFR BLD: 41.5 % (ref 38–73)
NITRITE UR QL STRIP: NEGATIVE
NRBC BLD-RTO: 0 /100 WBC
PH UR STRIP: 7 [PH] (ref 5–8)
PLATELET # BLD AUTO: 570 K/UL (ref 150–450)
PLATELET BLD QL SMEAR: ABNORMAL
PMV BLD AUTO: 9 FL (ref 9.2–12.9)
POTASSIUM SERPL-SCNC: 4.7 MMOL/L (ref 3.5–5.1)
PROT SERPL-MCNC: 5.8 G/DL (ref 6–8.4)
PROT UR QL STRIP: NEGATIVE
RBC # BLD AUTO: 3.56 M/UL (ref 4–5.4)
RBC #/AREA URNS HPF: 0 /HPF (ref 0–4)
SODIUM SERPL-SCNC: 135 MMOL/L (ref 136–145)
SP GR UR STRIP: 1.02 (ref 1–1.03)
SQUAMOUS #/AREA URNS HPF: 5 /HPF
URN SPEC COLLECT METH UR: ABNORMAL
UROBILINOGEN UR STRIP-ACNC: NEGATIVE EU/DL
WBC # BLD AUTO: 13.39 K/UL (ref 3.9–12.7)

## 2022-01-14 PROCEDURE — 86140 C-REACTIVE PROTEIN: CPT | Mod: HCNC | Performed by: STUDENT IN AN ORGANIZED HEALTH CARE EDUCATION/TRAINING PROGRAM

## 2022-01-14 PROCEDURE — 25000003 PHARM REV CODE 250: Mod: HCNC | Performed by: NURSE PRACTITIONER

## 2022-01-14 PROCEDURE — 87086 URINE CULTURE/COLONY COUNT: CPT | Mod: HCNC | Performed by: STUDENT IN AN ORGANIZED HEALTH CARE EDUCATION/TRAINING PROGRAM

## 2022-01-14 PROCEDURE — 36415 COLL VENOUS BLD VENIPUNCTURE: CPT | Mod: HCNC | Performed by: NURSE PRACTITIONER

## 2022-01-14 PROCEDURE — 94761 N-INVAS EAR/PLS OXIMETRY MLT: CPT | Mod: HCNC

## 2022-01-14 PROCEDURE — 85025 COMPLETE CBC W/AUTO DIFF WBC: CPT | Mod: HCNC | Performed by: NURSE PRACTITIONER

## 2022-01-14 PROCEDURE — 81000 URINALYSIS NONAUTO W/SCOPE: CPT | Mod: HCNC | Performed by: STUDENT IN AN ORGANIZED HEALTH CARE EDUCATION/TRAINING PROGRAM

## 2022-01-14 PROCEDURE — 25000003 PHARM REV CODE 250: Mod: HCNC | Performed by: STUDENT IN AN ORGANIZED HEALTH CARE EDUCATION/TRAINING PROGRAM

## 2022-01-14 PROCEDURE — 12000002 HC ACUTE/MED SURGE SEMI-PRIVATE ROOM: Mod: HCNC

## 2022-01-14 PROCEDURE — 82550 ASSAY OF CK (CPK): CPT | Mod: HCNC | Performed by: STUDENT IN AN ORGANIZED HEALTH CARE EDUCATION/TRAINING PROGRAM

## 2022-01-14 PROCEDURE — 99223 1ST HOSP IP/OBS HIGH 75: CPT | Mod: S$GLB,,, | Performed by: STUDENT IN AN ORGANIZED HEALTH CARE EDUCATION/TRAINING PROGRAM

## 2022-01-14 PROCEDURE — 36415 COLL VENOUS BLD VENIPUNCTURE: CPT | Mod: HCNC | Performed by: STUDENT IN AN ORGANIZED HEALTH CARE EDUCATION/TRAINING PROGRAM

## 2022-01-14 PROCEDURE — 80053 COMPREHEN METABOLIC PANEL: CPT | Mod: HCNC | Performed by: NURSE PRACTITIONER

## 2022-01-14 PROCEDURE — 63600175 PHARM REV CODE 636 W HCPCS: Mod: HCNC | Performed by: STUDENT IN AN ORGANIZED HEALTH CARE EDUCATION/TRAINING PROGRAM

## 2022-01-14 PROCEDURE — 99223 PR INITIAL HOSPITAL CARE,LEVL III: ICD-10-PCS | Mod: S$GLB,,, | Performed by: STUDENT IN AN ORGANIZED HEALTH CARE EDUCATION/TRAINING PROGRAM

## 2022-01-14 PROCEDURE — 25000003 PHARM REV CODE 250: Mod: HCNC

## 2022-01-14 PROCEDURE — 63600175 PHARM REV CODE 636 W HCPCS: Mod: HCNC | Performed by: NURSE PRACTITIONER

## 2022-01-14 PROCEDURE — 87040 BLOOD CULTURE FOR BACTERIA: CPT | Mod: HCNC | Performed by: NURSE PRACTITIONER

## 2022-01-14 RX ORDER — LIDOCAINE HYDROCHLORIDE 10 MG/ML
1 INJECTION INFILTRATION; PERINEURAL ONCE
Status: DISCONTINUED | OUTPATIENT
Start: 2022-01-14 | End: 2022-01-14

## 2022-01-14 RX ORDER — LIDOCAINE HYDROCHLORIDE 10 MG/ML
10 INJECTION INFILTRATION; PERINEURAL ONCE
Status: COMPLETED | OUTPATIENT
Start: 2022-01-14 | End: 2022-01-14

## 2022-01-14 RX ADMIN — OXYCODONE HYDROCHLORIDE AND ACETAMINOPHEN 1 TABLET: 5; 325 TABLET ORAL at 03:01

## 2022-01-14 RX ADMIN — GABAPENTIN 300 MG: 300 CAPSULE ORAL at 02:01

## 2022-01-14 RX ADMIN — MORPHINE SULFATE 4 MG: 4 INJECTION INTRAVENOUS at 05:01

## 2022-01-14 RX ADMIN — MORPHINE SULFATE 4 MG: 4 INJECTION INTRAVENOUS at 08:01

## 2022-01-14 RX ADMIN — CLINDAMYCIN IN 5 PERCENT DEXTROSE 600 MG: 12 INJECTION, SOLUTION INTRAVENOUS at 02:01

## 2022-01-14 RX ADMIN — LORAZEPAM 0.5 MG: 0.5 TABLET ORAL at 11:01

## 2022-01-14 RX ADMIN — BACLOFEN 10 MG: 10 TABLET ORAL at 08:01

## 2022-01-14 RX ADMIN — LIDOCAINE HYDROCHLORIDE 10 ML: 10 INJECTION, SOLUTION INFILTRATION; PERINEURAL at 12:01

## 2022-01-14 RX ADMIN — ATORVASTATIN CALCIUM 10 MG: 10 TABLET, FILM COATED ORAL at 08:01

## 2022-01-14 RX ADMIN — OXYCODONE HYDROCHLORIDE AND ACETAMINOPHEN 1 TABLET: 5; 325 TABLET ORAL at 08:01

## 2022-01-14 RX ADMIN — MORPHINE SULFATE 4 MG: 4 INJECTION INTRAVENOUS at 12:01

## 2022-01-14 RX ADMIN — LAMOTRIGINE 50 MG: 25 TABLET ORAL at 08:01

## 2022-01-14 RX ADMIN — LORAZEPAM 0.5 MG: 0.5 TABLET ORAL at 05:01

## 2022-01-14 RX ADMIN — FLUOXETINE 20 MG: 20 CAPSULE ORAL at 08:01

## 2022-01-14 RX ADMIN — LORAZEPAM 0.5 MG: 0.5 TABLET ORAL at 03:01

## 2022-01-14 RX ADMIN — LORAZEPAM 0.5 MG: 0.5 TABLET ORAL at 09:01

## 2022-01-14 RX ADMIN — OXYCODONE HYDROCHLORIDE AND ACETAMINOPHEN 1 TABLET: 5; 325 TABLET ORAL at 09:01

## 2022-01-14 RX ADMIN — Medication 1 EACH: at 08:01

## 2022-01-14 RX ADMIN — MORPHINE SULFATE 4 MG: 4 INJECTION INTRAVENOUS at 09:01

## 2022-01-14 RX ADMIN — MORPHINE SULFATE 4 MG: 4 INJECTION INTRAVENOUS at 02:01

## 2022-01-14 RX ADMIN — LEVOTHYROXINE SODIUM 150 MCG: 150 TABLET ORAL at 08:01

## 2022-01-14 RX ADMIN — GABAPENTIN 300 MG: 300 CAPSULE ORAL at 08:01

## 2022-01-14 RX ADMIN — TRAZODONE HYDROCHLORIDE 25 MG: 50 TABLET ORAL at 08:01

## 2022-01-14 RX ADMIN — DAPTOMYCIN 405 MG: 350 INJECTION, POWDER, LYOPHILIZED, FOR SOLUTION INTRAVENOUS at 01:01

## 2022-01-14 RX ADMIN — CLINDAMYCIN IN 5 PERCENT DEXTROSE 600 MG: 12 INJECTION, SOLUTION INTRAVENOUS at 09:01
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
## 2022-01-14 NOTE — NURSING
Follow up wound care. Patient pending a visit by surgery team for bedside assessment of the wound. Wound care nurse will follow up as needed.

## 2022-01-14 NOTE — ASSESSMENT & PLAN NOTE
Patient is chronically on statin.will continue for now. Monitor clinically. Last LDL was   Lab Results   Component Value Date    LDLCALC 141.6 11/17/2020

## 2022-01-14 NOTE — ASSESSMENT & PLAN NOTE
Patient has hypokalemia which is currently controlled. Last electrolytes reviewed-   Recent Labs   Lab 01/13/22  0434 01/14/22  0443   K 4.0 4.7   . Will replace potassium and monitor electrolytes closely.

## 2022-01-14 NOTE — PROGRESS NOTES
General Surgery Service    Seen and examined. Pain control remains an issue, but reports her pain is better than when she presented. Afebrile. VSS.  S Aureus from cultures.          A/P: L gluteal abscess    Continue wound care, penrose in place  Will debride necrotic tissue at bedside today  Mason Del Rio MD   [facility]

## 2022-01-14 NOTE — CONSULTS
Follow up consult for wound care post op day 1 I&D by Dr. Hays. Message via secure chat with Dr. Hays regarding packing. Per Dr. Hays wound care nurse to remove packing to the left buttocks. Patient was medicated with analgesic prior to packing removal and wound assessment. Removed the packing as instructed. There is an area of necrotic tissue to the anterior pinrose drain site that is partially detached from the skin and this area may require a bedside debridement if needed if this tissue remains hanging onto the skin.   Wound is indurated above and below the areas of the drains. There is some redness noted in the immediate area of the openings. Pictures obtained. Significant other at the bedside and educated regarding wound care twice a day and more frequently if needed to manage the drainage. Explained that an abscess has to heal from the inside out. Significant other and patient very receptive to bedside wound care education. Wound care will continue to follow this patient.       Left buttock    Left anterior buttock

## 2022-01-14 NOTE — CARE UPDATE
01/14/22 0855   PRE-TX-O2   O2 Device (Oxygen Therapy) room air   SpO2 (!) 94 %   Pulse Oximetry Type Intermittent   $ Pulse Oximetry - Multiple Charge Pulse Oximetry - Multiple   Pulse 79   Resp 16

## 2022-01-14 NOTE — CARE UPDATE
01/13/22 2012   Patient Assessment/Suction   Level of Consciousness (AVPU) alert   PRE-TX-O2   O2 Device (Oxygen Therapy) room air   SpO2 99 %   Pulse Oximetry Type Intermittent
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
## 2022-01-14 NOTE — SUBJECTIVE & OBJECTIVE
Interval History: Patient seen and examined.  Remains in significant pain- pain meds helping.  Only tolerates laying on abdomen.  No N/V.  Cultures growing staph- sensitivities pending.  Will consult ID and plan to broaden antibiotics.  Patient not sleeping well-pain medication added.    Review of Systems   Constitutional: Negative for chills, fatigue and fever.   HENT: Negative for trouble swallowing.    Respiratory: Negative for cough and shortness of breath.    Gastrointestinal: Negative for abdominal pain.   Genitourinary: Negative for difficulty urinating and dysuria.   Musculoskeletal: Positive for myalgias.   Skin: Positive for color change and wound.     Objective:     Vital Signs (Most Recent):  Temp: 97.2 °F (36.2 °C) (01/14/22 0737)  Pulse: 79 (01/14/22 0855)  Resp: 16 (01/14/22 0953)  BP: 120/62 (01/14/22 0737)  SpO2: (!) 94 % (01/14/22 0855) Vital Signs (24h Range):  Temp:  [96.8 °F (36 °C)-98.1 °F (36.7 °C)] 97.2 °F (36.2 °C)  Pulse:  [69-80] 79  Resp:  [15-19] 16  SpO2:  [94 %-100 %] 94 %  BP: (120-139)/(62-76) 120/62     Weight: 67.5 kg (148 lb 13 oz)  Body mass index is 25.54 kg/m².    Intake/Output Summary (Last 24 hours) at 1/14/2022 1145  Last data filed at 1/14/2022 0535  Gross per 24 hour   Intake 147.27 ml   Output --   Net 147.27 ml      Physical Exam  Vitals and nursing note reviewed.   Constitutional:       Appearance: Normal appearance.   HENT:      Head: Normocephalic and atraumatic.      Mouth/Throat:      Mouth: Mucous membranes are dry.      Pharynx: Oropharynx is clear.   Eyes:      Extraocular Movements: Extraocular movements intact.      Conjunctiva/sclera: Conjunctivae normal.      Pupils: Pupils are equal, round, and reactive to light.   Cardiovascular:      Rate and Rhythm: Normal rate and regular rhythm.   Pulmonary:      Effort: Pulmonary effort is normal. No respiratory distress.      Breath sounds: Normal breath sounds.   Abdominal:      General: Abdomen is flat. Bowel sounds  are normal. There is no distension.      Palpations: Abdomen is soft.      Tenderness: There is no abdominal tenderness.   Musculoskeletal:         General: Normal range of motion.      Cervical back: Normal range of motion and neck supple.   Skin:     General: Skin is warm.      Capillary Refill: Capillary refill takes less than 2 seconds.      Findings: Erythema present.      Comments: See photo- remains with gluteal erythema and induration, tender to palpation.  Drain in place.   Neurological:      General: No focal deficit present.      Mental Status: She is alert and oriented to person, place, and time. Mental status is at baseline.   Psychiatric:         Mood and Affect: Mood normal.         Behavior: Behavior normal.         Thought Content: Thought content normal.         Judgment: Judgment normal.             Significant Labs:   All pertinent labs within the past 24 hours have been reviewed.  CBC:   Recent Labs   Lab 01/13/22 0434 01/14/22 0443   WBC 12.97* 13.39*   HGB 10.7* 10.8*   HCT 32.7* 32.6*   * 570*     CMP:   Recent Labs   Lab 01/13/22  0434 01/14/22  0443    135*   K 4.0 4.7    99   CO2 27 26   * 85   BUN 13 11   CREATININE 0.8 0.8   CALCIUM 9.3 9.3   PROT 5.7* 5.8*   ALBUMIN 2.5* 2.6*   BILITOT 0.1 0.1   ALKPHOS 147* 131   AST 24 26   ALT 54* 50*   ANIONGAP 9 10   EGFRNONAA >60 >60       Significant Imaging: I have reviewed all pertinent imaging results/findings within the past 24 hours.

## 2022-01-14 NOTE — ASSESSMENT & PLAN NOTE
S/p I&D left buttocks with Dr. Hays 1/12/22 - wound very indurated with bullous cellulitis present, penrose drain intact  -Discussed with ID: cultures growing staph.  Escalate to dapto (Vanc allergy)  -Follow CBC (uptick noted)  -ID to follow  -Wound care consulted

## 2022-01-14 NOTE — PLAN OF CARE
Plan of care reviewed with patient at bedside. VSS, afebrile, and on RA. Pt independent to ADL's and ambulatory. No tele ordered at this time. Wound care performed at shift change by wound care nurse. C/o pain throughout shift; PRN pain meds given per MAR. IV ABX's given per MAR. Safety precautions maintained throughout shift.

## 2022-01-15 ENCOUNTER — OUTSIDE PLACE OF SERVICE (OUTPATIENT)
Dept: INFECTIOUS DISEASES | Facility: CLINIC | Age: 47
End: 2022-01-15
Payer: MEDICARE

## 2022-01-15 DIAGNOSIS — L03.317 CELLULITIS AND ABSCESS OF BUTTOCK: ICD-10-CM

## 2022-01-15 DIAGNOSIS — L02.31 CELLULITIS AND ABSCESS OF BUTTOCK: ICD-10-CM

## 2022-01-15 LAB
ALBUMIN SERPL BCP-MCNC: 3.1 G/DL (ref 3.5–5.2)
ALP SERPL-CCNC: 143 U/L (ref 55–135)
ALT SERPL W/O P-5'-P-CCNC: 47 U/L (ref 10–44)
ANION GAP SERPL CALC-SCNC: 8 MMOL/L (ref 8–16)
AST SERPL-CCNC: 28 U/L (ref 10–40)
BACTERIA SPEC AEROBE CULT: ABNORMAL
BACTERIA UR CULT: NO GROWTH
BASOPHILS # BLD AUTO: ABNORMAL K/UL (ref 0–0.2)
BASOPHILS NFR BLD: 0 % (ref 0–1.9)
BILIRUB SERPL-MCNC: 0.1 MG/DL (ref 0.1–1)
BUN SERPL-MCNC: 12 MG/DL (ref 6–20)
CALCIUM SERPL-MCNC: 10.2 MG/DL (ref 8.7–10.5)
CHLORIDE SERPL-SCNC: 95 MMOL/L (ref 95–110)
CO2 SERPL-SCNC: 31 MMOL/L (ref 23–29)
CREAT SERPL-MCNC: 0.8 MG/DL (ref 0.5–1.4)
DIFFERENTIAL METHOD: ABNORMAL
EOSINOPHIL # BLD AUTO: ABNORMAL K/UL (ref 0–0.5)
EOSINOPHIL NFR BLD: 1 % (ref 0–8)
ERYTHROCYTE [DISTWIDTH] IN BLOOD BY AUTOMATED COUNT: 12.7 % (ref 11.5–14.5)
EST. GFR  (AFRICAN AMERICAN): >60 ML/MIN/1.73 M^2
EST. GFR  (NON AFRICAN AMERICAN): >60 ML/MIN/1.73 M^2
ESTIMATED AVG GLUCOSE: 97 MG/DL (ref 68–131)
GLUCOSE SERPL-MCNC: 81 MG/DL (ref 70–110)
HBA1C MFR BLD: 5 % (ref 4–5.6)
HCT VFR BLD AUTO: 35.4 % (ref 37–48.5)
HGB BLD-MCNC: 11.3 G/DL (ref 12–16)
IMM GRANULOCYTES # BLD AUTO: ABNORMAL K/UL (ref 0–0.04)
IMM GRANULOCYTES NFR BLD AUTO: ABNORMAL % (ref 0–0.5)
LYMPHOCYTES # BLD AUTO: ABNORMAL K/UL (ref 1–4.8)
LYMPHOCYTES NFR BLD: 33 % (ref 18–48)
MCH RBC QN AUTO: 29.5 PG (ref 27–31)
MCHC RBC AUTO-ENTMCNC: 31.9 G/DL (ref 32–36)
MCV RBC AUTO: 92 FL (ref 82–98)
METAMYELOCYTES NFR BLD MANUAL: 8 %
MONOCYTES # BLD AUTO: ABNORMAL K/UL (ref 0.3–1)
MONOCYTES NFR BLD: 7 % (ref 4–15)
MYELOCYTES NFR BLD MANUAL: 2 %
NEUTROPHILS NFR BLD: 41 % (ref 38–73)
NEUTS BAND NFR BLD MANUAL: 7 %
NRBC BLD-RTO: 0 /100 WBC
PLATELET # BLD AUTO: 682 K/UL (ref 150–450)
PLATELET BLD QL SMEAR: ABNORMAL
PMV BLD AUTO: 8.9 FL (ref 9.2–12.9)
POTASSIUM SERPL-SCNC: 5.1 MMOL/L (ref 3.5–5.1)
PROMYELOCYTES NFR BLD MANUAL: 1 %
PROT SERPL-MCNC: 6.5 G/DL (ref 6–8.4)
RBC # BLD AUTO: 3.83 M/UL (ref 4–5.4)
SODIUM SERPL-SCNC: 134 MMOL/L (ref 136–145)
WBC # BLD AUTO: 14.69 K/UL (ref 3.9–12.7)

## 2022-01-15 PROCEDURE — 36415 COLL VENOUS BLD VENIPUNCTURE: CPT | Mod: HCNC | Performed by: NURSE PRACTITIONER

## 2022-01-15 PROCEDURE — 25000003 PHARM REV CODE 250: Mod: HCNC | Performed by: NURSE PRACTITIONER

## 2022-01-15 PROCEDURE — 25000003 PHARM REV CODE 250: Mod: HCNC | Performed by: HOSPITALIST

## 2022-01-15 PROCEDURE — 80053 COMPREHEN METABOLIC PANEL: CPT | Mod: HCNC | Performed by: NURSE PRACTITIONER

## 2022-01-15 PROCEDURE — 99233 PR SUBSEQUENT HOSPITAL CARE,LEVL III: ICD-10-PCS | Mod: S$GLB,,, | Performed by: INTERNAL MEDICINE

## 2022-01-15 PROCEDURE — 94761 N-INVAS EAR/PLS OXIMETRY MLT: CPT | Mod: HCNC

## 2022-01-15 PROCEDURE — 87389 HIV-1 AG W/HIV-1&-2 AB AG IA: CPT | Mod: HCNC | Performed by: STUDENT IN AN ORGANIZED HEALTH CARE EDUCATION/TRAINING PROGRAM

## 2022-01-15 PROCEDURE — 85007 BL SMEAR W/DIFF WBC COUNT: CPT | Mod: HCNC | Performed by: NURSE PRACTITIONER

## 2022-01-15 PROCEDURE — 85027 COMPLETE CBC AUTOMATED: CPT | Mod: HCNC | Performed by: NURSE PRACTITIONER

## 2022-01-15 PROCEDURE — 63600175 PHARM REV CODE 636 W HCPCS: Mod: HCNC | Performed by: NURSE PRACTITIONER

## 2022-01-15 PROCEDURE — 63600175 PHARM REV CODE 636 W HCPCS: Mod: HCNC | Performed by: STUDENT IN AN ORGANIZED HEALTH CARE EDUCATION/TRAINING PROGRAM

## 2022-01-15 PROCEDURE — 83036 HEMOGLOBIN GLYCOSYLATED A1C: CPT | Mod: HCNC | Performed by: STUDENT IN AN ORGANIZED HEALTH CARE EDUCATION/TRAINING PROGRAM

## 2022-01-15 PROCEDURE — 12000002 HC ACUTE/MED SURGE SEMI-PRIVATE ROOM: Mod: HCNC

## 2022-01-15 PROCEDURE — 99233 SBSQ HOSP IP/OBS HIGH 50: CPT | Mod: S$GLB,,, | Performed by: INTERNAL MEDICINE

## 2022-01-15 PROCEDURE — 25000003 PHARM REV CODE 250: Mod: HCNC | Performed by: STUDENT IN AN ORGANIZED HEALTH CARE EDUCATION/TRAINING PROGRAM

## 2022-01-15 RX ORDER — MUPIROCIN 20 MG/G
OINTMENT TOPICAL 2 TIMES DAILY
Status: DISCONTINUED | OUTPATIENT
Start: 2022-01-15 | End: 2022-01-18 | Stop reason: HOSPADM

## 2022-01-15 RX ORDER — LORAZEPAM 0.5 MG/1
0.5 TABLET ORAL EVERY 4 HOURS PRN
Status: DISCONTINUED | OUTPATIENT
Start: 2022-01-15 | End: 2022-01-18 | Stop reason: HOSPADM

## 2022-01-15 RX ORDER — TRAZODONE HYDROCHLORIDE 50 MG/1
50 TABLET ORAL NIGHTLY
Status: DISCONTINUED | OUTPATIENT
Start: 2022-01-15 | End: 2022-01-18 | Stop reason: HOSPADM

## 2022-01-15 RX ADMIN — ALUMINUM HYDROXIDE, MAGNESIUM HYDROXIDE, AND SIMETHICONE 5 ML: 200; 200; 20 SUSPENSION ORAL at 05:01

## 2022-01-15 RX ADMIN — OXYCODONE HYDROCHLORIDE AND ACETAMINOPHEN 1 TABLET: 5; 325 TABLET ORAL at 01:01

## 2022-01-15 RX ADMIN — LORAZEPAM 0.5 MG: 0.5 TABLET ORAL at 10:01

## 2022-01-15 RX ADMIN — OXYCODONE HYDROCHLORIDE AND ACETAMINOPHEN 1 TABLET: 5; 325 TABLET ORAL at 05:01

## 2022-01-15 RX ADMIN — TRAZODONE HYDROCHLORIDE 50 MG: 50 TABLET ORAL at 11:01

## 2022-01-15 RX ADMIN — MORPHINE SULFATE 4 MG: 4 INJECTION INTRAVENOUS at 07:01

## 2022-01-15 RX ADMIN — LORAZEPAM 0.5 MG: 0.5 TABLET ORAL at 05:01

## 2022-01-15 RX ADMIN — OXYCODONE HYDROCHLORIDE AND ACETAMINOPHEN 1 TABLET: 5; 325 TABLET ORAL at 07:01

## 2022-01-15 RX ADMIN — MUPIROCIN: 20 OINTMENT TOPICAL at 08:01

## 2022-01-15 RX ADMIN — LAMOTRIGINE 50 MG: 25 TABLET ORAL at 08:01

## 2022-01-15 RX ADMIN — MORPHINE SULFATE 4 MG: 4 INJECTION INTRAVENOUS at 10:01

## 2022-01-15 RX ADMIN — GABAPENTIN 300 MG: 300 CAPSULE ORAL at 08:01

## 2022-01-15 RX ADMIN — GABAPENTIN 300 MG: 300 CAPSULE ORAL at 03:01

## 2022-01-15 RX ADMIN — MUPIROCIN: 20 OINTMENT TOPICAL at 12:01

## 2022-01-15 RX ADMIN — ATORVASTATIN CALCIUM 10 MG: 10 TABLET, FILM COATED ORAL at 08:01

## 2022-01-15 RX ADMIN — DAPTOMYCIN 405 MG: 350 INJECTION, POWDER, LYOPHILIZED, FOR SOLUTION INTRAVENOUS at 01:01

## 2022-01-15 RX ADMIN — Medication 1 EACH: at 08:01

## 2022-01-15 RX ADMIN — OXYCODONE HYDROCHLORIDE AND ACETAMINOPHEN 1 TABLET: 5; 325 TABLET ORAL at 11:01

## 2022-01-15 RX ADMIN — MORPHINE SULFATE 4 MG: 4 INJECTION INTRAVENOUS at 02:01

## 2022-01-15 RX ADMIN — FLUOXETINE 20 MG: 20 CAPSULE ORAL at 08:01

## 2022-01-15 RX ADMIN — BACLOFEN 10 MG: 10 TABLET ORAL at 08:01

## 2022-01-15 RX ADMIN — OXYCODONE HYDROCHLORIDE AND ACETAMINOPHEN 1 TABLET: 5; 325 TABLET ORAL at 12:01

## 2022-01-15 RX ADMIN — MORPHINE SULFATE 4 MG: 4 INJECTION INTRAVENOUS at 04:01

## 2022-01-15 RX ADMIN — MORPHINE SULFATE 4 MG: 4 INJECTION INTRAVENOUS at 12:01

## 2022-01-15 RX ADMIN — LEVOTHYROXINE SODIUM 150 MCG: 150 TABLET ORAL at 08:01

## 2022-01-15 RX ADMIN — LORAZEPAM 0.5 MG: 0.5 TABLET ORAL at 12:01

## 2022-01-15 NOTE — SUBJECTIVE & OBJECTIVE
Interval History: Patient seen and examined.  Erythema seems to be improving some, though remains inflamed.  Afebrile.  WBC stagnant.  She continues to complain of pain, states pain medication helping.  Feels anxious.  Discussed positioning techniques to offload pressure on her wound.  Will continue IV Abx and follow ID recommendations. Surgery debrided at bedside yesterday.    Review of Systems   Constitutional: Negative for chills, fatigue and fever.   HENT: Negative for trouble swallowing.    Respiratory: Negative for cough and shortness of breath.    Gastrointestinal: Negative for abdominal pain.   Genitourinary: Negative for difficulty urinating and dysuria.   Musculoskeletal: Positive for myalgias.   Skin: Positive for color change and wound.     Objective:     Vital Signs (Most Recent):  Temp: 96.8 °F (36 °C) (01/15/22 0734)  Pulse: 80 (01/15/22 0734)  Resp: 18 (01/15/22 0734)  BP: 127/64 (01/15/22 0734)  SpO2: 96 % (01/15/22 0800) Vital Signs (24h Range):  Temp:  [96.8 °F (36 °C)-98.1 °F (36.7 °C)] 96.8 °F (36 °C)  Pulse:  [80-88] 80  Resp:  [15-19] 18  SpO2:  [95 %-98 %] 96 %  BP: (127-144)/(64-68) 127/64     Weight: 67.5 kg (148 lb 13 oz)  Body mass index is 25.54 kg/m².    Intake/Output Summary (Last 24 hours) at 1/15/2022 1054  Last data filed at 1/15/2022 0616  Gross per 24 hour   Intake 98.13 ml   Output 1400 ml   Net -1301.87 ml      Physical Exam  Vitals and nursing note reviewed.   Constitutional:       Appearance: Normal appearance.   HENT:      Head: Normocephalic and atraumatic.      Mouth/Throat:      Mouth: Mucous membranes are dry.      Pharynx: Oropharynx is clear.   Eyes:      Extraocular Movements: Extraocular movements intact.      Conjunctiva/sclera: Conjunctivae normal.      Pupils: Pupils are equal, round, and reactive to light.   Cardiovascular:      Rate and Rhythm: Normal rate and regular rhythm.   Pulmonary:      Effort: Pulmonary effort is normal. No respiratory distress.       Breath sounds: Normal breath sounds.   Abdominal:      General: Abdomen is flat. Bowel sounds are normal. There is no distension.      Palpations: Abdomen is soft.      Tenderness: There is no abdominal tenderness.   Musculoskeletal:         General: Normal range of motion.      Cervical back: Normal range of motion and neck supple.   Skin:     General: Skin is warm.      Capillary Refill: Capillary refill takes less than 2 seconds.      Findings: Erythema present.      Comments: Some improvement in erythema when compared to prior photo   Neurological:      General: No focal deficit present.      Mental Status: She is alert and oriented to person, place, and time. Mental status is at baseline.   Psychiatric:         Mood and Affect: Mood normal.         Behavior: Behavior normal.         Thought Content: Thought content normal.         Judgment: Judgment normal.      (1/14)        Significant Labs:   All pertinent labs within the past 24 hours have been reviewed.  CBC:   Recent Labs   Lab 01/14/22  0443 01/15/22  0820   WBC 13.39* 14.69*   HGB 10.8* 11.3*   HCT 32.6* 35.4*   * 682*     CMP:   Recent Labs   Lab 01/14/22  0443 01/15/22  0820   * 134*   K 4.7 5.1   CL 99 95   CO2 26 31*   GLU 85 81   BUN 11 12   CREATININE 0.8 0.8   CALCIUM 9.3 10.2   PROT 5.8* 6.5   ALBUMIN 2.6* 3.1*   BILITOT 0.1 0.1   ALKPHOS 131 143*   AST 26 28   ALT 50* 47*   ANIONGAP 10 8   EGFRNONAA >60 >60       Significant Imaging: I have reviewed all pertinent imaging results/findings within the past 24 hours.

## 2022-01-15 NOTE — HOSPITAL COURSE
Pt was monitor closely during her stay she was initiated on IV Abx and underwent I and D of her a left gluteal abscess on 1/12 with Dr. Hays.  Pain medications were adjusted for adequate pain control.  Her cellulitis worsened postoperatively and Abx were escalated with the assistance of ID who evaluated Pt made recommendations.  Cultures intraoperatively are growing MRSA.  She was continued on IV daptomycin.  Wound care was consulted to evaluate Pt implement recommendations.  She underwent bedside debridement of necrotic tissue on 01/14 with surgery.    1/16:  Patient reports that she is generally feeling better.  No fever or chills reported overnight.  Mild increase in leukocytosis but otherwise no acute events overnight.  Continue IV daptomycin and management as per surgery.  Will decrease frequency of pain medications to q.6h p.r.n..  Continue current management for now.    ID recommended home with IV Delvance infusion and bactrim x 14 days. Wound care removed drain from abscess site and packed per surgery recs. Patient referred to outpatient wound care.  She is medically stable for DC.

## 2022-01-15 NOTE — PROGRESS NOTES
Consult Note  Infectious Disease    Reason for Consult:  Left buttock abscess/Staph aureus infection    HPI: Jamila Lee is a 46 y.o. female active smoker, with past medical history of hypertension, hyperlipidemia, hypothyroidism, peripheral vascular disease, anxiety, chronic back and pelvic pain from prior crush injury to pelvis with residual unsteady gait/pelvic deformity, presenting to the ER complaining of a prior spider bite on 01/03.  As per patient, she noticed a spider bite on her left buttock, which gradually worsened by redness, severe pain, and purulent drainage, with associated chills.  She had attempted topical treatment, without improvement.  She admits to baseline cough, and suprapubic pain.  She denies fever, chest pain, shortness of breath, or change in bowel movement.    In the ER, tachypneic 24, hypertensive, afebrile breathing comfortable on room air  Initial blood work normal.  CT abdomen/pelvis remarkable for is isodense fluid collection within the subcutaneous inferior medial left buttock, cannot exclude abscess.    Hospital course complicated by worsening pain on copies purulent drainage.  She is status post I and D by surgery 1/12.   Abscess cultures grew Staph aureus pending sensitivities.  For patient with noted allergy to vancomycin, states she gets red after infusion.  Allergic to doxycycline as well.    Empirically started on clindamycin IV.    Hospital course complicated by worsening left buttock abscess, ID consult for antibiotic recommendations.      01/15/2022 afebrile, + pain  Left buttock. Sp bedside debridement today      33.4   CRP           Antibiotics (From admission, onward)            Start     Stop Route Frequency Ordered    01/15/22 1200  mupirocin 2 % ointment         01/20 0859 Nasl 2 times daily 01/15/22 1052    01/14/22 1245  DAPTOmycin (CUBICIN) 405 mg in sodium chloride 0.9% IVPB         -- IV Every 24 hours (non-standard times) 01/14/22 1139        Antifungals  (From admission, onward)            None        Antivirals (From admission, onward)    None          EXAM & DIAGNOSTICS REVIEWED:   Vitals:     Temp:  [96.2 °F (35.7 °C)-97.9 °F (36.6 °C)]   Temp: 96.2 °F (35.7 °C) (01/15/22 2317)  Pulse: 84 (01/15/22 2317)  Resp: 14 (01/15/22 2331)  BP: (!) 141/73 (01/15/22 2317)  SpO2: 100 % (01/15/22 2317)    Intake/Output Summary (Last 24 hours) at 1/16/2022 0016  Last data filed at 1/15/2022 1348  Gross per 24 hour   Intake 50.34 ml   Output 400 ml   Net -349.66 ml       General:  In NAD. Alert and attentive, cooperative, comfortable  Eyes:  Anicteric, PERRL  ENT:  No ulcers, exudates, thrush, nares patent, dentition is poor, poor oral hygiene   Neck:  Supple, no adenopathy appreciated  Lungs: Clear to auscultation b/l  Heart:  S1/S2+, regular rhythm, no murmurs  Abd:  +BS, soft, diffusely tender to palpation, no rebounf  :  Voids, urine clear, no flank tenderness  Musc:  Joints without effusion, swelling,  erythema, synovitis  Skin:  Warm, no rash - left buttock with significant indurated area, redness, very tender to palpation, 2 Penrose drains in place draining purulent fluid -see pictures below  Wound: As above  Neuro: Following commands, no acute focal deficit   Psych:  Calm, cooperative  Lymphatic:     No cervical, supraclavicular nodes  Extrem: No LE edema b/l  VAD:  Peripheral IV       Isolation:  None          1/11/22:      1/12/22:        1/14/22:          General Labs reviewed:  Recent Labs   Lab 01/13/22 0434 01/14/22 0443 01/15/22  0820   WBC 12.97* 13.39* 14.69*   HGB 10.7* 10.8* 11.3*   HCT 32.7* 32.6* 35.4*   * 570* 682*       Recent Labs   Lab 01/13/22  0434 01/14/22  0443 01/15/22  0820    135* 134*   K 4.0 4.7 5.1    99 95   CO2 27 26 31*   BUN 13 11 12   CREATININE 0.8 0.8 0.8   CALCIUM 9.3 9.3 10.2   PROT 5.7* 5.8* 6.5   BILITOT 0.1 0.1 0.1   ALKPHOS 147* 131 143*   ALT 54* 50* 47*   AST 24 26 28     Recent Labs   Lab 01/14/22  1451    CRP 33.4*     No results for input(s): SEDRATE in the last 168 hours.    Estimated Creatinine Clearance: 82.1 mL/min (based on SCr of 0.8 mg/dL).     Micro:  Microbiology Results (last 7 days)     Procedure Component Value Units Date/Time    Urine Culture High Risk [[id number]] Collected: 01/14/22 1736    Order Status: Completed Specimen: Urine, Clean Catch Updated: 01/15/22 2314     Urine Culture, Routine No growth    Narrative:      Indicated criteria for high risk culture:->Other  Other (specify):->rule out uti    Blood culture [[id number]] Collected: 01/14/22 1311    Order Status: Completed Specimen: Blood from Antecubital, Right Arm Updated: 01/15/22 2312     Blood Culture, Routine No Growth to date      No Growth to date    Narrative:      Collection has been rescheduled by [employer] at 01/14/2022 12:38 Reason:   patient is receiving wound care. will [place] back in 10 mins    Collection has been rescheduled by [employer] at 01/14/2022 12:38 Reason:   patient is receiving wound care. will [place] back in 10 mins      Blood culture [[id number]] Collected: 01/14/22 1311    Order Status: Completed Specimen: Blood from Antecubital, Left Arm Updated: 01/15/22 2312     Blood Culture, Routine No Growth to date      No Growth to date    Narrative:      Collection has been rescheduled by [employer] at 01/14/2022 12:38 Reason:   patient is receiving wound care. will [place] back in 10 mins    Collection has been rescheduled by [employer] at 01/14/2022 12:38 Reason:   patient is receiving wound care. will [place] back in 10 mins      Aerobic culture [[id number]]  (Abnormal)  (Susceptibility) Collected: 01/12/22 1354    Order Status: Completed Specimen: Abscess from Buttocks, Left Updated: 01/15/22 1049     Aerobic Bacterial Culture METHICILLIN RESISTANT STAPHYLOCOCCUS AUREUS  Many      Culture, Anaerobe [[id number]] Collected: 01/12/22 1354    Order Status: Completed Specimen: Abscess from Buttocks, Left Updated: 01/14/22 1441     Anaerobic  Culture Culture in progress        Methicillin resistant Staphylococcus aureus      CULTURE, AEROBIC  (SPECIFY SOURCE)    Clindamycin <=0.5 mcg/mL Sensitive    Erythromycin >4 mcg/mL Resistant    Oxacillin >2 mcg/mL Resistant    Penicillin >8 mcg/mL Resistant    Tetracycline <=4 mcg/mL Sensitive    Trimeth/Sulfa <=0.5/9.5 m... Sensitive    Vancomycin 1 mcg/mL Sensitive       Imaging Reviewed:  CT scan of the pelvis  Significant localized isodense material within the subcutaneous inferior medial left buttock, a well-formed fluid collection.  The findings are most compatible with phlegmon formation, without distinct abscess present at this time.         IMPRESSION & PLAN     1.  Severe cellulitis/abscess left buttock, due to MRSA,    s/p I and D by surgery 1/12,    bedside debridement on 01/15   HIV screen pending   [id number]    2.  Smoker, anxiety    Recommendations:  Adequate source control is done, penrose in place;   Surgery following   Wound care   Trend CRP  On Daptomycin 6 mg/kg IV daily(all to vanc and doxy)  Look into dalvance ( if not possible)+  Bactrim  If she improves fast; bactrim only  Not ready for discharge   Follow  4th gen HIV ordered     Medical Decision Making during this encounter was  [_] Low Complexity  [_] Moderate Complexity  [xx] High Complexity

## 2022-01-15 NOTE — PLAN OF CARE
Plan of care reviewed with patient at bedside. VSS, afebrile, and on RA. Pt independent to ADL's and ambulatory. No tele ordered at this time. DSG to wound/incision changed. C/o pain throughout shift; PRN pain meds given per MAR. Safety precautions maintained throughout shift.

## 2022-01-15 NOTE — CARE UPDATE
01/14/22 1959   Patient Assessment/Suction   Level of Consciousness (AVPU) alert   Respiratory Effort Normal;Unlabored   PRE-TX-O2   O2 Device (Oxygen Therapy) room air   SpO2 98 %   Pulse Oximetry Type Intermittent

## 2022-01-15 NOTE — PROGRESS NOTES
[facility] Medicine  Progress Note    Patient Name: Jamila Lee  MRN: [id number]  Patient Class: IP- Inpatient   Admission Date: 2022  Length of Stay: 2 days  Attending Physician: Pineda Sosa MD  Primary Care Provider: eGrmaine Wesley NP        Subjective:     Principal Problem:Cellulitis and abscess of buttock        HPI:  Ms CHOI is a 46 year old  female who presented to the ED with a CC of a spider bite on her buttocks. Pt reported she was bit on 1/3 or  and the area turned blood red at first then turned black where the bite was. It progressed to swelling with redness in a large area and she thought it burst and it expressed pus and watery discharge. She couldn't sit on it for 2 days then on 1/10 she felt it was a gooey mess. On 1/10 she also noticed that she got a bump on her tongue and on  she started getting bumps like small bites on her hands. She has never had fevers/chills/sweats; had a HA; had n/v. She did report that the pain in her buttocks was so bad on  that she passed out. She reported that she has multiple health problems, PMH significant for HTN, DLD, PVD, had a crush injury to pelvis and now has unsteady gait, pelvic deformity, back pain, and chronic muscle spasms to both lower legs, this also resulted in chronic pain syndrome. She has problems with anxiety and insomnia. PSH includes hysterectomy, colon surgery, pelvic fracture repair, choleycystectomy, and liver surgery. Social history: Tobacco: smokes half PPD some days, ETOH none, Illicit drugs: methadone management  Family hx: mother , father alive with cancer and heart disease. Lives alone.     Plan: admit, start abx (clindamycin), allergic to vancomycin- had it in hospital and turned bright red while getting it, consult wound care and surgery.       Overview/Hospital Course:  No notes on file    Interval History: Patient seen and examined.  Erythema seems to be  improving some, though remains inflamed.  Afebrile.  WBC stagnant.  She continues to complain of pain, states pain medication helping.  Feels anxious.  Discussed positioning techniques to offload pressure on her wound.  Will continue IV Abx and follow ID recommendations. Surgery debrided at bedside yesterday.    Review of Systems   Constitutional: Negative for chills, fatigue and fever.   HENT: Negative for trouble swallowing.    Respiratory: Negative for cough and shortness of breath.    Gastrointestinal: Negative for abdominal pain.   Genitourinary: Negative for difficulty urinating and dysuria.   Musculoskeletal: Positive for myalgias.   Skin: Positive for color change and wound.     Objective:     Vital Signs (Most Recent):  Temp: 96.8 °F (36 °C) (01/15/22 0734)  Pulse: 80 (01/15/22 0734)  Resp: 18 (01/15/22 0734)  BP: 127/64 (01/15/22 0734)  SpO2: 96 % (01/15/22 0800) Vital Signs (24h Range):  Temp:  [96.8 °F (36 °C)-98.1 °F (36.7 °C)] 96.8 °F (36 °C)  Pulse:  [80-88] 80  Resp:  [15-19] 18  SpO2:  [95 %-98 %] 96 %  BP: (127-144)/(64-68) 127/64     Weight: 67.5 kg (148 lb 13 oz)  Body mass index is 25.54 kg/m².    Intake/Output Summary (Last 24 hours) at 1/15/2022 1054  Last data filed at 1/15/2022 0616  Gross per 24 hour   Intake 98.13 ml   Output 1400 ml   Net -1301.87 ml      Physical Exam  Vitals and nursing note reviewed.   Constitutional:       Appearance: Normal appearance.   HENT:      Head: Normocephalic and atraumatic.      Mouth/Throat:      Mouth: Mucous membranes are dry.      Pharynx: Oropharynx is clear.   Eyes:      Extraocular Movements: Extraocular movements intact.      Conjunctiva/sclera: Conjunctivae normal.      Pupils: Pupils are equal, round, and reactive to light.   Cardiovascular:      Rate and Rhythm: Normal rate and regular rhythm.   Pulmonary:      Effort: Pulmonary effort is normal. No respiratory distress.      Breath sounds: Normal breath sounds.   Abdominal:      General: Abdomen  is flat. Bowel sounds are normal. There is no distension.      Palpations: Abdomen is soft.      Tenderness: There is no abdominal tenderness.   Musculoskeletal:         General: Normal range of motion.      Cervical back: Normal range of motion and neck supple.   Skin:     General: Skin is warm.      Capillary Refill: Capillary refill takes less than 2 seconds.      Findings: Erythema present.      Comments: Some improvement in erythema when compared to prior photo   Neurological:      General: No focal deficit present.      Mental Status: She is alert and oriented to person, place, and time. Mental status is at baseline.   Psychiatric:         Mood and Affect: Mood normal.         Behavior: Behavior normal.         Thought Content: Thought content normal.         Judgment: Judgment normal.      (1/14)        Significant Labs:   All pertinent labs within the past 24 hours have been reviewed.  CBC:   Recent Labs   Lab 01/14/22  0443 01/15/22  0820   WBC 13.39* 14.69*   HGB 10.8* 11.3*   HCT 32.6* 35.4*   * 682*     CMP:   Recent Labs   Lab 01/14/22  0443 01/15/22  0820   * 134*   K 4.7 5.1   CL 99 95   CO2 26 31*   GLU 85 81   BUN 11 12   CREATININE 0.8 0.8   CALCIUM 9.3 10.2   PROT 5.8* 6.5   ALBUMIN 2.6* 3.1*   BILITOT 0.1 0.1   ALKPHOS 131 143*   AST 26 28   ALT 50* 47*   ANIONGAP 10 8   EGFRNONAA >60 >60       Significant Imaging: I have reviewed all pertinent imaging results/findings within the past 24 hours.      Assessment/Plan:      * Cellulitis and abscess of buttock  S/p I&D left buttocks with Dr. Hays 1/12/22 - wound very indurated with bullous cellulitis present, penrose drain intact  -Discussed with ID: cultures growing staph.  Escalate to dapto (Vanc allergy)  -Follow CBC   -ID to follow  -Wound care consulted  -s/p bedside debridement on 1/14    Chronic pain due to injury  Chronic condition  -Requiring acute pain medication for acute infection/surgery      Hypothyroidism  Chronic  condition  Cont home meds      Hyperlipemia   Patient is chronically on statin.will continue for now. Monitor clinically. Last LDL was   Lab Results   Component Value Date    LDLCALC 141.6 11/17/2020              VTE Risk Mitigation (From admission, onward)         Ordered     IP VTE HIGH RISK PATIENT  Once         01/11/22 1748     Place sequential compression device  Until discontinued         01/11/22 1748                Discharge Planning   RICKEY: 1/17/2022     Code Status: Full Code   Is the patient medically ready for discharge?:     Reason for patient still in hospital (select all that apply): Patient trending condition, Treatment and Consult recommendations  Discharge Plan A: Home                  Anjali Ozuna NP  Department of Hospital Medicine   [facility]

## 2022-01-15 NOTE — ASSESSMENT & PLAN NOTE
S/p I&D left buttocks with Dr. Hays 1/12/22 - wound very indurated with bullous cellulitis present, penrose drain intact  -Discussed with ID: cultures growing staph.  Escalate to dapto (Vanc allergy)  -Follow CBC   -ID to follow  -Wound care consulted  -s/p bedside debridement on 1/14

## 2022-01-16 LAB
ALBUMIN SERPL BCP-MCNC: 2.8 G/DL (ref 3.5–5.2)
ALP SERPL-CCNC: 112 U/L (ref 55–135)
ALT SERPL W/O P-5'-P-CCNC: 40 U/L (ref 10–44)
ANION GAP SERPL CALC-SCNC: 6 MMOL/L (ref 8–16)
AST SERPL-CCNC: 27 U/L (ref 10–40)
BASOPHILS NFR BLD: 0 % (ref 0–1.9)
BILIRUB SERPL-MCNC: 0.1 MG/DL (ref 0.1–1)
BUN SERPL-MCNC: 14 MG/DL (ref 6–20)
CALCIUM SERPL-MCNC: 10.2 MG/DL (ref 8.7–10.5)
CHLORIDE SERPL-SCNC: 95 MMOL/L (ref 95–110)
CO2 SERPL-SCNC: 33 MMOL/L (ref 23–29)
CREAT SERPL-MCNC: 0.9 MG/DL (ref 0.5–1.4)
DIFFERENTIAL METHOD: ABNORMAL
EOSINOPHIL NFR BLD: 0 % (ref 0–8)
ERYTHROCYTE [DISTWIDTH] IN BLOOD BY AUTOMATED COUNT: 12.7 % (ref 11.5–14.5)
EST. GFR  (AFRICAN AMERICAN): >60 ML/MIN/1.73 M^2
EST. GFR  (NON AFRICAN AMERICAN): >60 ML/MIN/1.73 M^2
GLUCOSE SERPL-MCNC: 102 MG/DL (ref 70–110)
HCT VFR BLD AUTO: 31.1 % (ref 37–48.5)
HGB BLD-MCNC: 9.9 G/DL (ref 12–16)
IMM GRANULOCYTES # BLD AUTO: ABNORMAL K/UL
IMM GRANULOCYTES NFR BLD AUTO: ABNORMAL %
LYMPHOCYTES NFR BLD: 52 % (ref 18–48)
MCH RBC QN AUTO: 29.6 PG (ref 27–31)
MCHC RBC AUTO-ENTMCNC: 31.8 G/DL (ref 32–36)
MCV RBC AUTO: 93 FL (ref 82–98)
MONOCYTES NFR BLD: 10 % (ref 4–15)
NEUTROPHILS NFR BLD: 37 % (ref 38–73)
NEUTS BAND NFR BLD MANUAL: 1 %
NRBC BLD-RTO: 0 /100 WBC
PLATELET # BLD AUTO: 599 K/UL (ref 150–450)
PMV BLD AUTO: 8.8 FL (ref 9.2–12.9)
POTASSIUM SERPL-SCNC: 5 MMOL/L (ref 3.5–5.1)
PROT SERPL-MCNC: 5.8 G/DL (ref 6–8.4)
RBC # BLD AUTO: 3.34 M/UL (ref 4–5.4)
SODIUM SERPL-SCNC: 134 MMOL/L (ref 136–145)
WBC # BLD AUTO: 15.23 K/UL (ref 3.9–12.7)

## 2022-01-16 PROCEDURE — 25000003 PHARM REV CODE 250: Mod: HCNC | Performed by: NURSE PRACTITIONER

## 2022-01-16 PROCEDURE — 63600175 PHARM REV CODE 636 W HCPCS: Mod: HCNC | Performed by: NURSE PRACTITIONER

## 2022-01-16 PROCEDURE — 63600175 PHARM REV CODE 636 W HCPCS: Mod: HCNC | Performed by: STUDENT IN AN ORGANIZED HEALTH CARE EDUCATION/TRAINING PROGRAM

## 2022-01-16 PROCEDURE — 80053 COMPREHEN METABOLIC PANEL: CPT | Mod: HCNC | Performed by: NURSE PRACTITIONER

## 2022-01-16 PROCEDURE — 12000002 HC ACUTE/MED SURGE SEMI-PRIVATE ROOM: Mod: HCNC

## 2022-01-16 PROCEDURE — 99233 PR SUBSEQUENT HOSPITAL CARE,LEVL III: ICD-10-PCS | Mod: S$GLB,,, | Performed by: INTERNAL MEDICINE

## 2022-01-16 PROCEDURE — 25000003 PHARM REV CODE 250: Mod: HCNC | Performed by: INTERNAL MEDICINE

## 2022-01-16 PROCEDURE — 36415 COLL VENOUS BLD VENIPUNCTURE: CPT | Mod: HCNC | Performed by: NURSE PRACTITIONER

## 2022-01-16 PROCEDURE — 85027 COMPLETE CBC AUTOMATED: CPT | Mod: HCNC | Performed by: NURSE PRACTITIONER

## 2022-01-16 PROCEDURE — 94761 N-INVAS EAR/PLS OXIMETRY MLT: CPT | Mod: HCNC

## 2022-01-16 PROCEDURE — 25000003 PHARM REV CODE 250: Mod: HCNC | Performed by: HOSPITALIST

## 2022-01-16 PROCEDURE — 99233 SBSQ HOSP IP/OBS HIGH 50: CPT | Mod: S$GLB,,, | Performed by: INTERNAL MEDICINE

## 2022-01-16 PROCEDURE — 25000003 PHARM REV CODE 250: Mod: HCNC | Performed by: STUDENT IN AN ORGANIZED HEALTH CARE EDUCATION/TRAINING PROGRAM

## 2022-01-16 PROCEDURE — 85007 BL SMEAR W/DIFF WBC COUNT: CPT | Mod: HCNC | Performed by: NURSE PRACTITIONER

## 2022-01-16 PROCEDURE — 63600175 PHARM REV CODE 636 W HCPCS: Mod: HCNC | Performed by: INTERNAL MEDICINE

## 2022-01-16 PROCEDURE — 27000207 HC ISOLATION: Mod: HCNC

## 2022-01-16 RX ORDER — DOCUSATE SODIUM 100 MG/1
100 CAPSULE, LIQUID FILLED ORAL 2 TIMES DAILY
Status: DISCONTINUED | OUTPATIENT
Start: 2022-01-16 | End: 2022-01-18 | Stop reason: HOSPADM

## 2022-01-16 RX ORDER — MORPHINE SULFATE 4 MG/ML
4 INJECTION, SOLUTION INTRAMUSCULAR; INTRAVENOUS EVERY 6 HOURS PRN
Status: DISCONTINUED | OUTPATIENT
Start: 2022-01-16 | End: 2022-01-18 | Stop reason: HOSPADM

## 2022-01-16 RX ORDER — OXYCODONE AND ACETAMINOPHEN 5; 325 MG/1; MG/1
1 TABLET ORAL EVERY 6 HOURS PRN
Status: DISCONTINUED | OUTPATIENT
Start: 2022-01-16 | End: 2022-01-18 | Stop reason: HOSPADM

## 2022-01-16 RX ADMIN — LEVOTHYROXINE SODIUM 150 MCG: 150 TABLET ORAL at 09:01

## 2022-01-16 RX ADMIN — MUPIROCIN: 20 OINTMENT TOPICAL at 09:01

## 2022-01-16 RX ADMIN — MORPHINE SULFATE 4 MG: 4 INJECTION INTRAVENOUS at 05:01

## 2022-01-16 RX ADMIN — GABAPENTIN 300 MG: 300 CAPSULE ORAL at 08:01

## 2022-01-16 RX ADMIN — DOCUSATE SODIUM 100 MG: 100 CAPSULE, LIQUID FILLED ORAL at 08:01

## 2022-01-16 RX ADMIN — LORAZEPAM 0.5 MG: 0.5 TABLET ORAL at 11:01

## 2022-01-16 RX ADMIN — OXYCODONE HYDROCHLORIDE AND ACETAMINOPHEN 1 TABLET: 5; 325 TABLET ORAL at 06:01

## 2022-01-16 RX ADMIN — Medication 1 EACH: at 09:01

## 2022-01-16 RX ADMIN — GABAPENTIN 300 MG: 300 CAPSULE ORAL at 09:01

## 2022-01-16 RX ADMIN — LORAZEPAM 0.5 MG: 0.5 TABLET ORAL at 07:01

## 2022-01-16 RX ADMIN — ATORVASTATIN CALCIUM 10 MG: 10 TABLET, FILM COATED ORAL at 09:01

## 2022-01-16 RX ADMIN — OXYCODONE HYDROCHLORIDE AND ACETAMINOPHEN 1 TABLET: 5; 325 TABLET ORAL at 11:01

## 2022-01-16 RX ADMIN — LORAZEPAM 0.5 MG: 0.5 TABLET ORAL at 02:01

## 2022-01-16 RX ADMIN — LORAZEPAM 0.5 MG: 0.5 TABLET ORAL at 05:01

## 2022-01-16 RX ADMIN — MUPIROCIN: 20 OINTMENT TOPICAL at 08:01

## 2022-01-16 RX ADMIN — FLUOXETINE 20 MG: 20 CAPSULE ORAL at 08:01

## 2022-01-16 RX ADMIN — LAMOTRIGINE 50 MG: 25 TABLET ORAL at 08:01

## 2022-01-16 RX ADMIN — FLUOXETINE 20 MG: 20 CAPSULE ORAL at 09:01

## 2022-01-16 RX ADMIN — GABAPENTIN 300 MG: 300 CAPSULE ORAL at 02:01

## 2022-01-16 RX ADMIN — MORPHINE SULFATE 4 MG: 4 INJECTION INTRAVENOUS at 01:01

## 2022-01-16 RX ADMIN — TRAZODONE HYDROCHLORIDE 50 MG: 50 TABLET ORAL at 08:01

## 2022-01-16 RX ADMIN — MORPHINE SULFATE 4 MG: 4 INJECTION INTRAVENOUS at 09:01

## 2022-01-16 RX ADMIN — OXYCODONE HYDROCHLORIDE AND ACETAMINOPHEN 1 TABLET: 5; 325 TABLET ORAL at 04:01

## 2022-01-16 RX ADMIN — BACLOFEN 10 MG: 10 TABLET ORAL at 08:01

## 2022-01-16 RX ADMIN — DAPTOMYCIN 405 MG: 350 INJECTION, POWDER, LYOPHILIZED, FOR SOLUTION INTRAVENOUS at 01:01

## 2022-01-16 RX ADMIN — LORAZEPAM 0.5 MG: 0.5 TABLET ORAL at 09:01

## 2022-01-16 RX ADMIN — MORPHINE SULFATE 4 MG: 4 INJECTION INTRAVENOUS at 08:01

## 2022-01-16 NOTE — SUBJECTIVE & OBJECTIVE
Interval History: As above.    Review of Systems   Constitutional: Negative for chills and fever.   HENT: Negative for congestion, postnasal drip, rhinorrhea and sore throat.    Respiratory: Negative for cough, chest tightness and shortness of breath.    Cardiovascular: Negative for palpitations.   Gastrointestinal: Negative for abdominal pain, constipation, diarrhea, nausea and vomiting.   Genitourinary: Negative for dysuria and hematuria.   Skin: Positive for color change and wound.   Neurological: Negative for dizziness, light-headedness and headaches.     Objective:     Vital Signs (Most Recent):  Temp: 97.6 °F (36.4 °C) (01/16/22 1159)  Pulse: 87 (01/16/22 1159)  Resp: 18 (01/16/22 1359)  BP: 124/64 (01/16/22 1159)  SpO2: 98 % (01/16/22 1159) Vital Signs (24h Range):  Temp:  [96.2 °F (35.7 °C)-97.9 °F (36.6 °C)] 97.6 °F (36.4 °C)  Pulse:  [84-96] 87  Resp:  [14-18] 18  SpO2:  [95 %-100 %] 98 %  BP: (116-160)/(58-80) 124/64     Weight: 67.5 kg (148 lb 13 oz)  Body mass index is 25.54 kg/m².    Intake/Output Summary (Last 24 hours) at 1/16/2022 1435  Last data filed at 1/16/2022 0500  Gross per 24 hour   Intake 700 ml   Output --   Net 700 ml      Physical Exam  Constitutional:       General: She is not in acute distress.     Appearance: Normal appearance.   HENT:      Head: Normocephalic and atraumatic.   Eyes:      Extraocular Movements: Extraocular movements intact.   Cardiovascular:      Rate and Rhythm: Normal rate.   Pulmonary:      Effort: Pulmonary effort is normal. No tachypnea or respiratory distress.   Abdominal:      General: Abdomen is flat. There is no distension.   Musculoskeletal:         General: Normal range of motion.      Cervical back: Normal range of motion and neck supple.   Skin:     General: Skin is dry.   Neurological:      Mental Status: She is alert and oriented to person, place, and time.   Psychiatric:         Attention and Perception: Attention and perception normal.         Mood  and Affect: Mood and affect normal.         Significant Labs: All pertinent labs within the past 24 hours have been reviewed.    Significant Imaging: I have reviewed all pertinent imaging results/findings within the past 24 hours.

## 2022-01-16 NOTE — PROGRESS NOTES
Consult Note  Infectious Disease    Reason for Consult:  Left buttock abscess/Staph aureus infection    HPI: Jamila Lee is a 46 y.o. female active smoker, with past medical history of hypertension, hyperlipidemia, hypothyroidism, peripheral vascular disease, anxiety, chronic back and pelvic pain from prior crush injury to pelvis with residual unsteady gait/pelvic deformity, presenting to the ER complaining of a prior spider bite on 01/03.  As per patient, she noticed a spider bite on her left buttock, which gradually worsened by redness, severe pain, and purulent drainage, with associated chills.  She had attempted topical treatment, without improvement.  She admits to baseline cough, and suprapubic pain.  She denies fever, chest pain, shortness of breath, or change in bowel movement.    In the ER, tachypneic 24, hypertensive, afebrile breathing comfortable on room air  Initial blood work normal.  CT abdomen/pelvis remarkable for is isodense fluid collection within the subcutaneous inferior medial left buttock, cannot exclude abscess.    Hospital course complicated by worsening pain on copies purulent drainage.  She is status post I and D by surgery 1/12.   Abscess cultures grew Staph aureus pending sensitivities.  For patient with noted allergy to vancomycin, states she gets red after infusion.  Allergic to doxycycline as well.    Empirically started on clindamycin IV.    Hospital course complicated by worsening left buttock abscess, ID consult for antibiotic recommendations.  01/15/2022 afebrile, + pain  Left buttock. Sp bedside debridement today  01/16/2022.  Afebrile.  T-max 97.9°.  By friend at bedside very attentive to her.  Wound was examined carefully there was no odor no pus Penrose still in place.  There is minimal area of thickening, I am pleased with improvement.  Hospitalist is working on decrease the amount of opiates.    Antibiotics (From admission, onward)            Start     Stop Route Frequency  Ordered    01/15/22 1200  mupirocin 2 % ointment         01/20 0859 Nasl 2 times daily 01/15/22 1052    01/14/22 1245  DAPTOmycin (CUBICIN) 405 mg in sodium chloride 0.9% IVPB         -- IV Every 24 hours (non-standard times) 01/14/22 1139        Antifungals (From admission, onward)            None        Antivirals (From admission, onward)    None          EXAM & DIAGNOSTICS REVIEWED:   Vitals:     Temp:  [96.2 °F (35.7 °C)-97.9 °F (36.6 °C)]   Temp: 97.5 °F (36.4 °C) (01/16/22 0435)  Pulse: 88 (01/16/22 0649)  Resp: 16 (01/16/22 1111)  BP: 129/71 (01/16/22 0516)  SpO2: 95 % (01/16/22 0649)    Intake/Output Summary (Last 24 hours) at 1/16/2022 1114  Last data filed at 1/16/2022 0500  Gross per 24 hour   Intake 750.34 ml   Output --   Net 750.34 ml       General:  In NAD. Alert and attentive, cooperative, comfortable  Eyes:  Anicteric, PERRL  ENT:  No ulcers, exudates, thrush, nares patent, dentition is poor, poor oral hygiene   Neck:  Supple, no adenopathy appreciated  Lungs: Clear to auscultation b/l  Heart:  S1/S2+, regular rhythm, no murmurs  Abd:  +BS, soft, diffusely tender to palpation, no rebound  :  Voids, urine clear, no flank tenderness  Musc:  Joints without effusion, swelling,  erythema, synovitis  Skin:  Warm, no rash - left buttock with significant indurated area, redness, very tender to palpation, 2 Penrose drains in place draining purulent fluid -see pictures below  Wound: As above  Neuro: Following commands, no acute focal deficit   Psych:  Calm, cooperative  Lymphatic:     No cervical, supraclavicular nodes  Extrem: No LE edema b/l  VAD:  Peripheral IV       Isolation:  None            1/11/22:      1/12/22:        1/14/22:      01/16/2022.  Physical exam today shows marked improvement of induration.    General Labs reviewed:  Recent Labs   Lab 01/14/22  0443 01/15/22  0820 01/16/22  0716   WBC 13.39* 14.69* 15.23*   HGB 10.8* 11.3* 9.9*   HCT 32.6* 35.4* 31.1*   * 682* 599*       Recent  Labs   Lab 01/14/22  0443 01/15/22  0820 01/16/22  0716   * 134* 134*   K 4.7 5.1 5.0   CL 99 95 95   CO2 26 31* 33*   BUN 11 12 14   CREATININE 0.8 0.8 0.9   CALCIUM 9.3 10.2 10.2   PROT 5.8* 6.5 5.8*   BILITOT 0.1 0.1 0.1   ALKPHOS 131 143* 112   ALT 50* 47* 40   AST 26 28 27     Recent Labs   Lab 01/14/22  1451   CRP 33.4*     No results for input(s): SEDRATE in the last 168 hours.    Estimated Creatinine Clearance: 73 mL/min (based on SCr of 0.9 mg/dL).     Micro:  Microbiology Results (last 7 days)     Procedure Component Value Units Date/Time    Urine Culture High Risk [[id number]] Collected: 01/14/22 1736    Order Status: Completed Specimen: Urine, Clean Catch Updated: 01/15/22 2314     Urine Culture, Routine No growth    Narrative:      Indicated criteria for high risk culture:->Other  Other (specify):->rule out uti    Blood culture [[id number]] Collected: 01/14/22 1311    Order Status: Completed Specimen: Blood from Antecubital, Right Arm Updated: 01/15/22 2312     Blood Culture, Routine No Growth to date      No Growth to date    Narrative:      Collection has been rescheduled by [employer] at 01/14/2022 12:38 Reason:   patient is receiving wound care. will [place] back in 10 mins    Collection has been rescheduled by [facility] at 01/14/2022 12:38 Reason:   patient is receiving wound care. will [place] back in 10 mins      Blood culture [[id number]] Collected: 01/14/22 1311    Order Status: Completed Specimen: Blood from Antecubital, Left Arm Updated: 01/15/22 2312     Blood Culture, Routine No Growth to date      No Growth to date    Narrative:      Collection has been rescheduled by [facility] at 01/14/2022 12:38 Reason:   patient is receiving wound care. will [place] back in 10 mins    Collection has been rescheduled by [employer] at 01/14/2022 12:38 Reason:   patient is receiving wound care. will [place] back in 10 mins      Aerobic culture [[id number]]  (Abnormal)  (Susceptibility) Collected: 01/12/22 9844    Order  Status: Completed Specimen: Abscess from Buttocks, Left Updated: 01/15/22 1049     Aerobic Bacterial Culture METHICILLIN RESISTANT STAPHYLOCOCCUS AUREUS  Many      Culture, Anaerobe [[id number]] Collected: 01/12/22 1354    Order Status: Completed Specimen: Abscess from Buttocks, Left Updated: 01/14/22 1441     Anaerobic Culture Culture in progress        Methicillin resistant Staphylococcus aureus      CULTURE, AEROBIC  (SPECIFY SOURCE)    Clindamycin <=0.5 mcg/mL Sensitive    Erythromycin >4 mcg/mL Resistant    Oxacillin >2 mcg/mL Resistant    Penicillin >8 mcg/mL Resistant    Tetracycline <=4 mcg/mL Sensitive    Trimeth/Sulfa <=0.5/9.5 m... Sensitive    Vancomycin 1 mcg/mL Sensitive       Imaging Reviewed:  CT scan of the pelvis  Significant localized isodense material within the subcutaneous inferior medial left buttock, a well-formed fluid collection.  The findings are most compatible with phlegmon formation, without distinct abscess present at this time.         IMPRESSION & PLAN     1.  Severe cellulitis/abscess left buttock, due to MRSA,    s/p I and D by surgery 1/12,    bedside debridement on 01/15   HIV screen pending   [id number]    2.  Smoker, anxiety    Recommendations:  Adequate source control is done, penrose in place;   Continue  Wound care   Trend CRP  On Daptomycin 6 mg/kg IV daily(allergy to vanc and doxy)  Look into dalvance 1.5 g IV x1 + bactrim ds p.o. b.i.d.x14 days   If dalvance is not  possible, then  Bactrim only  Follow  4th gen HIV ordered   Follow in ID clinic with me in 7-10 days, before completion of antibiotics.    Medical Decision Making during this encounter was  [_] Low Complexity  [_] Moderate Complexity  [xx] High Complexity

## 2022-01-16 NOTE — PROGRESS NOTES
[facility] Hospital Medicine  Telemedicine Progress Note    Patient Name: Jamila Lee  MRN: [id number]  Patient Class: IP- Inpatient   Admission Date: 2022  Length of Stay: 3 days  Attending Physician: Caden Hester MD  Primary Care Provider: Germaine Wesley NP          Subjective:     Principal Problem:Cellulitis and abscess of buttock        HPI:  Ms CHOI is a 46 year old  female who presented to the ED with a CC of a spider bite on her buttocks. Pt reported she was bit on 1/3 or  and the area turned blood red at first then turned black where the bite was. It progressed to swelling with redness in a large area and she thought it burst and it expressed pus and watery discharge. She couldn't sit on it for 2 days then on 1/10 she felt it was a gooey mess. On 1/10 she also noticed that she got a bump on her tongue and on  she started getting bumps like small bites on her hands. She has never had fevers/chills/sweats; had a HA; had n/v. She did report that the pain in her buttocks was so bad on  that she passed out. She reported that she has multiple health problems, PMH significant for HTN, DLD, PVD, had a crush injury to pelvis and now has unsteady gait, pelvic deformity, back pain, and chronic muscle spasms to both lower legs, this also resulted in chronic pain syndrome. She has problems with anxiety and insomnia. PSH includes hysterectomy, colon surgery, pelvic fracture repair, choleycystectomy, and liver surgery. Social history: Tobacco: smokes half PPD some days, ETOH none, Illicit drugs: methadone management  Family hx: mother , father alive with cancer and heart disease. Lives alone.     Plan: admit, start abx (clindamycin), allergic to vancomycin- had it in hospital and turned bright red while getting it, consult wound care and surgery.       Overview/Hospital Course:  Pt was monitor closely during her stay she was initiated on IV Abx and  underwent I and D of her a left gluteal abscess on 1/12 with Dr. Hays.  Pain medications were adjusted for adequate pain control.  Her cellulitis worsened postoperatively and Abx were escalated with the assistance of ID who evaluated Pt made recommendations.  Cultures intraoperatively are growing MRSA.  She was continued on IV daptomycin.  Wound care was consulted to evaluate Pt implement recommendations.  She underwent bedside debridement of necrotic tissue on 01/14 with surgery.    1/16:  Patient reports that she is generally feeling better.  No fever or chills reported overnight.  Mild increase in leukocytosis but otherwise no acute events overnight.  Continue IV daptomycin and management as per surgery.  Will decrease frequency of pain medications to q.6h p.r.n..  Continue current management for now.      Interval History: As above.    Review of Systems   Constitutional: Negative for chills and fever.   HENT: Negative for congestion, postnasal drip, rhinorrhea and sore throat.    Respiratory: Negative for cough, chest tightness and shortness of breath.    Cardiovascular: Negative for palpitations.   Gastrointestinal: Negative for abdominal pain, constipation, diarrhea, nausea and vomiting.   Genitourinary: Negative for dysuria and hematuria.   Skin: Positive for color change and wound.   Neurological: Negative for dizziness, light-headedness and headaches.     Objective:     Vital Signs (Most Recent):  Temp: 97.6 °F (36.4 °C) (01/16/22 1159)  Pulse: 87 (01/16/22 1159)  Resp: 18 (01/16/22 1359)  BP: 124/64 (01/16/22 1159)  SpO2: 98 % (01/16/22 1159) Vital Signs (24h Range):  Temp:  [96.2 °F (35.7 °C)-97.9 °F (36.6 °C)] 97.6 °F (36.4 °C)  Pulse:  [84-96] 87  Resp:  [14-18] 18  SpO2:  [95 %-100 %] 98 %  BP: (116-160)/(58-80) 124/64     Weight: 67.5 kg (148 lb 13 oz)  Body mass index is 25.54 kg/m².    Intake/Output Summary (Last 24 hours) at 1/16/2022 7976  Last data filed at 1/16/2022 0500  Gross per 24 hour    Intake 700 ml   Output --   Net 700 ml      Physical Exam  Constitutional:       General: She is not in acute distress.     Appearance: Normal appearance.   HENT:      Head: Normocephalic and atraumatic.   Eyes:      Extraocular Movements: Extraocular movements intact.   Cardiovascular:      Rate and Rhythm: Normal rate.   Pulmonary:      Effort: Pulmonary effort is normal. No tachypnea or respiratory distress.   Abdominal:      General: Abdomen is flat. There is no distension.   Musculoskeletal:         General: Normal range of motion.      Cervical back: Normal range of motion and neck supple.   Skin:     General: Skin is dry.   Neurological:      Mental Status: She is alert and oriented to person, place, and time.   Psychiatric:         Attention and Perception: Attention and perception normal.         Mood and Affect: Mood and affect normal.         Significant Labs: All pertinent labs within the past 24 hours have been reviewed.    Significant Imaging: I have reviewed all pertinent imaging results/findings within the past 24 hours.      Assessment/Plan:      * Cellulitis and abscess of buttock  S/p I&D left buttocks with Dr. Hays 1/12/22 - wound very indurated with bullous cellulitis present, penrose drain intact  -Discussed with ID: cultures growing staph.  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 Monitor clinically. Last LDL was   Lab Results   Component Value Date    LDLCALC 141.6 11/17/2020              VTE Risk Mitigation (From admission, onward)         Ordered     IP VTE HIGH RISK PATIENT  Once         01/11/22 1748     Place sequential compression device  Until discontinued          01/11/22 1748                      I have assessed these finding virtually using telemed platform and with assistance of bedside nurse                 The attending portion of this evaluation, treatment, and documentation was performed per Caden Hester MD via Telemedicine AudioVisual using the secure [employer] software platform with 2 way audio/video. The provider was located off-site and the patient is located in the hospital. The aforementioned video software was utilized to document the relevant history and physical exam    Caden Hester MD  Department of Hospital Medicine   [facility]

## 2022-01-16 NOTE — CONSULTS
Thank you for your consult to [facility]. We have reviewed the patient chart. This patient does meet criteria for [facility] service at this time. Will assume care on 01/16/22 at 7AM

## 2022-01-16 NOTE — PLAN OF CARE
ABHAY sent IV consult/order and medical records to [facility] via [employer] and called [phone], spoke to Mascorro and she confirmed receipt of referral. ABHAY gave her MARLEE May CM phone # to f/u with on Monday.   01/16/22 7284   Post-Acute Status   Post-Acute Authorization IV Infusion   IV Infusion Status Referral(s) sent

## 2022-01-16 NOTE — PLAN OF CARE
POC discussed with pt, pt verbalized understanding. Oriented x4. PIV cdi. Penrose drain intact. Pt ambulated to the restroom with stand by assist. Pt medicated with prns for complaints of pain to the left buttock. Dressing changed during the shift per pt and family. Pt showered. Call light in reach, bed alarm set, safety maintained. No complaints or requests at this time, will continue to monitor.

## 2022-01-17 LAB — HIV 1+2 AB+HIV1 P24 AG SERPL QL IA: NEGATIVE

## 2022-01-17 PROCEDURE — 63600175 PHARM REV CODE 636 W HCPCS: Mod: HCNC | Performed by: STUDENT IN AN ORGANIZED HEALTH CARE EDUCATION/TRAINING PROGRAM

## 2022-01-17 PROCEDURE — 27000207 HC ISOLATION: Mod: HCNC

## 2022-01-17 PROCEDURE — 25000003 PHARM REV CODE 250: Mod: HCNC | Performed by: INTERNAL MEDICINE

## 2022-01-17 PROCEDURE — 12000002 HC ACUTE/MED SURGE SEMI-PRIVATE ROOM: Mod: HCNC

## 2022-01-17 PROCEDURE — 25000003 PHARM REV CODE 250: Mod: HCNC | Performed by: NURSE PRACTITIONER

## 2022-01-17 PROCEDURE — 25000003 PHARM REV CODE 250: Mod: HCNC | Performed by: STUDENT IN AN ORGANIZED HEALTH CARE EDUCATION/TRAINING PROGRAM

## 2022-01-17 PROCEDURE — 25000003 PHARM REV CODE 250: Mod: HCNC | Performed by: HOSPITALIST

## 2022-01-17 PROCEDURE — 63600175 PHARM REV CODE 636 W HCPCS: Mod: HCNC | Performed by: INTERNAL MEDICINE

## 2022-01-17 RX ADMIN — DOCUSATE SODIUM 100 MG: 100 CAPSULE, LIQUID FILLED ORAL at 10:01

## 2022-01-17 RX ADMIN — MORPHINE SULFATE 4 MG: 4 INJECTION INTRAVENOUS at 07:01

## 2022-01-17 RX ADMIN — LORAZEPAM 0.5 MG: 0.5 TABLET ORAL at 01:01

## 2022-01-17 RX ADMIN — OXYCODONE HYDROCHLORIDE AND ACETAMINOPHEN 1 TABLET: 5; 325 TABLET ORAL at 03:01

## 2022-01-17 RX ADMIN — DOCUSATE SODIUM 100 MG: 100 CAPSULE, LIQUID FILLED ORAL at 08:01

## 2022-01-17 RX ADMIN — LEVOTHYROXINE SODIUM 150 MCG: 150 TABLET ORAL at 08:01

## 2022-01-17 RX ADMIN — LAMOTRIGINE 50 MG: 25 TABLET ORAL at 10:01

## 2022-01-17 RX ADMIN — GABAPENTIN 300 MG: 300 CAPSULE ORAL at 03:01

## 2022-01-17 RX ADMIN — Medication 1 EACH: at 08:01

## 2022-01-17 RX ADMIN — OXYCODONE HYDROCHLORIDE AND ACETAMINOPHEN 1 TABLET: 5; 325 TABLET ORAL at 08:01

## 2022-01-17 RX ADMIN — LORAZEPAM 0.5 MG: 0.5 TABLET ORAL at 08:01

## 2022-01-17 RX ADMIN — MUPIROCIN: 20 OINTMENT TOPICAL at 10:01

## 2022-01-17 RX ADMIN — DAPTOMYCIN 405 MG: 350 INJECTION, POWDER, LYOPHILIZED, FOR SOLUTION INTRAVENOUS at 01:01

## 2022-01-17 RX ADMIN — BACLOFEN 10 MG: 10 TABLET ORAL at 10:01

## 2022-01-17 RX ADMIN — FLUOXETINE 20 MG: 20 CAPSULE ORAL at 08:01

## 2022-01-17 RX ADMIN — FLUOXETINE 20 MG: 20 CAPSULE ORAL at 10:01

## 2022-01-17 RX ADMIN — MORPHINE SULFATE 4 MG: 4 INJECTION INTRAVENOUS at 01:01

## 2022-01-17 RX ADMIN — TRAZODONE HYDROCHLORIDE 50 MG: 50 TABLET ORAL at 10:01

## 2022-01-17 RX ADMIN — ATORVASTATIN CALCIUM 10 MG: 10 TABLET, FILM COATED ORAL at 08:01

## 2022-01-17 RX ADMIN — LORAZEPAM 0.5 MG: 0.5 TABLET ORAL at 07:01

## 2022-01-17 RX ADMIN — OXYCODONE HYDROCHLORIDE AND ACETAMINOPHEN 1 TABLET: 5; 325 TABLET ORAL at 10:01

## 2022-01-17 RX ADMIN — MUPIROCIN: 20 OINTMENT TOPICAL at 08:01

## 2022-01-17 RX ADMIN — GABAPENTIN 300 MG: 300 CAPSULE ORAL at 08:01

## 2022-01-17 RX ADMIN — GABAPENTIN 300 MG: 300 CAPSULE ORAL at 10:01

## 2022-01-17 NOTE — PLAN OF CARE
C/o pain, treated. Wound care done by friend in the room. Placed on contact iso for mrsa in wound. Tolerating meals, good appetite. Abx given. Still no md YIFAN aware, orders obtained. Safety maintained

## 2022-01-17 NOTE — PLAN OF CARE
CM initiated PA for dalvance with [employer] through [email or web address]       01/17/22 1626   Post-Acute Status   Post-Acute Authorization IV Infusion   IV Infusion Status Pending payor review/awaiting authorization (if required)

## 2022-01-17 NOTE — PLAN OF CARE
Spoke with Marita from [facility]. Ni requires PA from pt's insurance. States [employer] would not let [facility] initiate PA, has to be started from hospital. CM will initiate today       01/17/22 1200   Post-Acute Status   Post-Acute Authorization IV Infusion   IV Infusion Status Pending payor review/awaiting authorization (if required)

## 2022-01-17 NOTE — PLAN OF CARE
POC discussed with pt, pt verbalized understanding. Oriented x4. PIV cdi. Penrose drain intact. Pt ambulated to the restroom with stand by assist. Pt medicated with prns for complaints of pain to the left buttock. Dressing changed during the shift per pt and family. Contact isolation precautions maintained. Call light in reach, bed alarm set, safety maintained. No complaints or requests at this time, will continue to monitor.

## 2022-01-17 NOTE — PLAN OF CARE
Problem: Adult Inpatient Plan of Care  Goal: Plan of Care Review  Outcome: Ongoing, Progressing  Goal: Patient-Specific Goal (Individualized)  Outcome: Ongoing, Progressing  Goal: Absence of Hospital-Acquired Illness or Injury  Outcome: Ongoing, Progressing  Goal: Optimal Comfort and Wellbeing  Outcome: Ongoing, Progressing  Goal: Readiness for Transition of Care  Outcome: Ongoing, Progressing     Problem: Infection  Goal: Absence of Infection Signs and Symptoms  Outcome: Ongoing, Progressing     Pt alert and oriented. Afebrile. Oxygen maintained @ room air. IV abt administered as ordered. Pain managed with medications as currently ordered. Ambulatory.  Wound care performed by significant other as requested. Safety maintained.

## 2022-01-18 VITALS
OXYGEN SATURATION: 97 % | TEMPERATURE: 97 F | DIASTOLIC BLOOD PRESSURE: 55 MMHG | HEART RATE: 92 BPM | WEIGHT: 148.81 LBS | SYSTOLIC BLOOD PRESSURE: 109 MMHG | HEIGHT: 64 IN | RESPIRATION RATE: 18 BRPM | BODY MASS INDEX: 25.41 KG/M2

## 2022-01-18 LAB
ALBUMIN SERPL BCP-MCNC: 3.1 G/DL (ref 3.5–5.2)
ALP SERPL-CCNC: 116 U/L (ref 55–135)
ALT SERPL W/O P-5'-P-CCNC: 41 U/L (ref 10–44)
ANION GAP SERPL CALC-SCNC: 9 MMOL/L (ref 8–16)
AST SERPL-CCNC: 36 U/L (ref 10–40)
BACTERIA SPEC ANAEROBE CULT: NORMAL
BASOPHILS # BLD AUTO: 0.16 K/UL (ref 0–0.2)
BASOPHILS NFR BLD: 1 % (ref 0–1.9)
BILIRUB SERPL-MCNC: 0.1 MG/DL (ref 0.1–1)
BUN SERPL-MCNC: 13 MG/DL (ref 6–20)
CALCIUM SERPL-MCNC: 10 MG/DL (ref 8.7–10.5)
CHLORIDE SERPL-SCNC: 98 MMOL/L (ref 95–110)
CO2 SERPL-SCNC: 29 MMOL/L (ref 23–29)
CREAT SERPL-MCNC: 0.8 MG/DL (ref 0.5–1.4)
DIFFERENTIAL METHOD: ABNORMAL
EOSINOPHIL # BLD AUTO: 0.3 K/UL (ref 0–0.5)
EOSINOPHIL NFR BLD: 2 % (ref 0–8)
ERYTHROCYTE [DISTWIDTH] IN BLOOD BY AUTOMATED COUNT: 13.2 % (ref 11.5–14.5)
EST. GFR  (AFRICAN AMERICAN): >60 ML/MIN/1.73 M^2
EST. GFR  (NON AFRICAN AMERICAN): >60 ML/MIN/1.73 M^2
GLUCOSE SERPL-MCNC: 75 MG/DL (ref 70–110)
HCT VFR BLD AUTO: 32.2 % (ref 37–48.5)
HGB BLD-MCNC: 10.1 G/DL (ref 12–16)
IMM GRANULOCYTES # BLD AUTO: 1.71 K/UL (ref 0–0.04)
IMM GRANULOCYTES NFR BLD AUTO: 11.1 % (ref 0–0.5)
LYMPHOCYTES # BLD AUTO: 4.7 K/UL (ref 1–4.8)
LYMPHOCYTES NFR BLD: 30.3 % (ref 18–48)
MCH RBC QN AUTO: 29.7 PG (ref 27–31)
MCHC RBC AUTO-ENTMCNC: 31.4 G/DL (ref 32–36)
MCV RBC AUTO: 95 FL (ref 82–98)
MONOCYTES # BLD AUTO: 0.7 K/UL (ref 0.3–1)
MONOCYTES NFR BLD: 4.7 % (ref 4–15)
NEUTROPHILS # BLD AUTO: 7.8 K/UL (ref 1.8–7.7)
NEUTROPHILS NFR BLD: 50.9 % (ref 38–73)
NRBC BLD-RTO: 0 /100 WBC
PLATELET # BLD AUTO: 601 K/UL (ref 150–450)
PMV BLD AUTO: 9 FL (ref 9.2–12.9)
POTASSIUM SERPL-SCNC: 4.7 MMOL/L (ref 3.5–5.1)
PROT SERPL-MCNC: 6.1 G/DL (ref 6–8.4)
RBC # BLD AUTO: 3.4 M/UL (ref 4–5.4)
SODIUM SERPL-SCNC: 136 MMOL/L (ref 136–145)
WBC # BLD AUTO: 15.4 K/UL (ref 3.9–12.7)

## 2022-01-18 PROCEDURE — 25000003 PHARM REV CODE 250: Mod: HCNC | Performed by: STUDENT IN AN ORGANIZED HEALTH CARE EDUCATION/TRAINING PROGRAM

## 2022-01-18 PROCEDURE — 25000003 PHARM REV CODE 250: Mod: HCNC | Performed by: NURSE PRACTITIONER

## 2022-01-18 PROCEDURE — 63600175 PHARM REV CODE 636 W HCPCS: Mod: HCNC | Performed by: STUDENT IN AN ORGANIZED HEALTH CARE EDUCATION/TRAINING PROGRAM

## 2022-01-18 PROCEDURE — 25000003 PHARM REV CODE 250: Mod: HCNC | Performed by: INTERNAL MEDICINE

## 2022-01-18 PROCEDURE — 80053 COMPREHEN METABOLIC PANEL: CPT | Mod: HCNC | Performed by: NURSE PRACTITIONER

## 2022-01-18 PROCEDURE — 63600175 PHARM REV CODE 636 W HCPCS: Mod: HCNC | Performed by: INTERNAL MEDICINE

## 2022-01-18 PROCEDURE — 85025 COMPLETE CBC W/AUTO DIFF WBC: CPT | Mod: HCNC | Performed by: NURSE PRACTITIONER

## 2022-01-18 PROCEDURE — 36415 COLL VENOUS BLD VENIPUNCTURE: CPT | Mod: HCNC | Performed by: NURSE PRACTITIONER

## 2022-01-18 RX ORDER — DALBAVANCIN 500 MG/25ML
1500 INJECTION, POWDER, FOR SOLUTION INTRAVENOUS ONCE
Qty: 75 ML | Refills: 0
Start: 2022-01-18 | End: 2022-01-18

## 2022-01-18 RX ORDER — OXYCODONE AND ACETAMINOPHEN 5; 325 MG/1; MG/1
1 TABLET ORAL EVERY 6 HOURS PRN
Qty: 15 TABLET | Refills: 0 | Status: SHIPPED | OUTPATIENT
Start: 2022-01-18 | End: 2022-01-23

## 2022-01-18 RX ORDER — LEVOTHYROXINE SODIUM 150 UG/1
150 TABLET ORAL DAILY
Qty: 30 TABLET | Refills: 0 | Status: SHIPPED | OUTPATIENT
Start: 2022-01-18 | End: 2023-01-18

## 2022-01-18 RX ORDER — ONDANSETRON 4 MG/1
4 TABLET, FILM COATED ORAL EVERY 8 HOURS PRN
Qty: 15 TABLET | Refills: 0 | Status: SHIPPED | OUTPATIENT
Start: 2022-01-18

## 2022-01-18 RX ORDER — SULFAMETHOXAZOLE AND TRIMETHOPRIM 800; 160 MG/1; MG/1
1 TABLET ORAL 2 TIMES DAILY
Qty: 28 TABLET | Refills: 0 | Status: SHIPPED | OUTPATIENT
Start: 2022-01-18 | End: 2022-01-25 | Stop reason: SDUPTHER

## 2022-01-18 RX ADMIN — MORPHINE SULFATE 4 MG: 4 INJECTION INTRAVENOUS at 12:01

## 2022-01-18 RX ADMIN — OXYCODONE HYDROCHLORIDE AND ACETAMINOPHEN 1 TABLET: 5; 325 TABLET ORAL at 03:01

## 2022-01-18 RX ADMIN — GABAPENTIN 300 MG: 300 CAPSULE ORAL at 09:01

## 2022-01-18 RX ADMIN — ATORVASTATIN CALCIUM 10 MG: 10 TABLET, FILM COATED ORAL at 09:01

## 2022-01-18 RX ADMIN — LORAZEPAM 0.5 MG: 0.5 TABLET ORAL at 07:01

## 2022-01-18 RX ADMIN — GABAPENTIN 300 MG: 300 CAPSULE ORAL at 02:01

## 2022-01-18 RX ADMIN — LORAZEPAM 0.5 MG: 0.5 TABLET ORAL at 12:01

## 2022-01-18 RX ADMIN — MUPIROCIN: 20 OINTMENT TOPICAL at 09:01

## 2022-01-18 RX ADMIN — LORAZEPAM 0.5 MG: 0.5 TABLET ORAL at 02:01

## 2022-01-18 RX ADMIN — DOCUSATE SODIUM 100 MG: 100 CAPSULE, LIQUID FILLED ORAL at 09:01

## 2022-01-18 RX ADMIN — Medication 1 EACH: at 09:01

## 2022-01-18 RX ADMIN — LEVOTHYROXINE SODIUM 150 MCG: 150 TABLET ORAL at 09:01

## 2022-01-18 RX ADMIN — FLUOXETINE 20 MG: 20 CAPSULE ORAL at 09:01

## 2022-01-18 RX ADMIN — OXYCODONE HYDROCHLORIDE AND ACETAMINOPHEN 1 TABLET: 5; 325 TABLET ORAL at 09:01

## 2022-01-18 RX ADMIN — MORPHINE SULFATE 4 MG: 4 INJECTION INTRAVENOUS at 07:01

## 2022-01-18 RX ADMIN — DAPTOMYCIN 405 MG: 350 INJECTION, POWDER, LYOPHILIZED, FOR SOLUTION INTRAVENOUS at 12:01

## 2022-01-18 NOTE — PROGRESS NOTES
[facility] Medicine  Telemedicine Progress Note    Patient Name: Jamila Lee  MRN: [id number]  Patient Class: IP- Inpatient   Admission Date: 2022  Length of Stay: 4 days  Attending Physician: Leanne Pittman MD  Primary Care Provider: Germaine Wesley NP          Subjective:     Principal Problem:Cellulitis and abscess of buttock        HPI:  Ms CHOI is a 46 year old  female who presented to the ED with a CC of a spider bite on her buttocks. Pt reported she was bit on 1/3 or  and the area turned blood red at first then turned black where the bite was. It progressed to swelling with redness in a large area and she thought it burst and it expressed pus and watery discharge. She couldn't sit on it for 2 days then on 1/10 she felt it was a gooey mess. On 1/10 she also noticed that she got a bump on her tongue and on  she started getting bumps like small bites on her hands. She has never had fevers/chills/sweats; had a HA; had n/v. She did report that the pain in her buttocks was so bad on  that she passed out. She reported that she has multiple health problems, PMH significant for HTN, DLD, PVD, had a crush injury to pelvis and now has unsteady gait, pelvic deformity, back pain, and chronic muscle spasms to both lower legs, this also resulted in chronic pain syndrome. She has problems with anxiety and insomnia. PSH includes hysterectomy, colon surgery, pelvic fracture repair, choleycystectomy, and liver surgery. Social history: Tobacco: smokes half PPD some days, ETOH none, Illicit drugs: methadone management  Family hx: mother , father alive with cancer and heart disease. Lives alone.     Plan: admit, start abx (clindamycin), allergic to vancomycin- had it in hospital and turned bright red while getting it, consult wound care and surgery.       Overview/Hospital Course:  Pt was monitor closely during her stay she was initiated on IV Abx and  underwent I and D of her a left gluteal abscess on 1/12 with Dr. Hays.  Pain medications were adjusted for adequate pain control.  Her cellulitis worsened postoperatively and Abx were escalated with the assistance of ID who evaluated Pt made recommendations.  Cultures intraoperatively are growing MRSA.  She was continued on IV daptomycin.  Wound care was consulted to evaluate Pt implement recommendations.  She underwent bedside debridement of necrotic tissue on 01/14 with surgery.    1/16:  Patient reports that she is generally feeling better.  No fever or chills reported overnight.  Mild increase in leukocytosis but otherwise no acute events overnight.  Continue IV daptomycin and management as per surgery.  Will decrease frequency of pain medications to q.6h p.r.n..  Continue current management for now.      Interval History:  Patient reports 7/10 sacral pain improves with pain meds.   CM consulted for arrangement of outpatient [facility]. Plan for DC tomorrow.     Review of Systems   Constitutional: Negative for chills and fever.   HENT: Negative for congestion, postnasal drip, rhinorrhea and sore throat.    Respiratory: Negative for cough, chest tightness and shortness of breath.    Cardiovascular: Negative for palpitations.   Gastrointestinal: Negative for abdominal pain, nausea and vomiting.   Genitourinary: Negative for dysuria and hematuria.   Skin: Positive for color change and wound.   Neurological: Negative for dizziness, light-headedness and headaches.     Objective:     Vital Signs (Most Recent):  Temp: 96.4 °F (35.8 °C) (01/17/22 1942)  Pulse: 93 (01/17/22 1942)  Resp: 14 (Simultaneous filing. User may not have seen previous data.) (01/17/22 1942)  BP: (!) 113/58 (01/17/22 1942)  SpO2: 98 % (01/17/22 1942) Vital Signs (24h Range):  Temp:  [96.1 °F (35.6 °C)-98.7 °F (37.1 °C)] 96.4 °F (35.8 °C)  Pulse:  [85-94] 93  Resp:  [14-18] 14  SpO2:  [95 %-100 %] 98 %  BP: (100-137)/(49-66) 113/58      Weight: 67.5 kg (148 lb 13 oz)  Body mass index is 25.54 kg/m².    Intake/Output Summary (Last 24 hours) at 1/17/2022 2043  Last data filed at 1/17/2022 0500  Gross per 24 hour   Intake 650 ml   Output --   Net 650 ml      Physical Exam  Constitutional:       General: She is not in acute distress.     Appearance: Normal appearance.   HENT:      Head: Normocephalic and atraumatic.   Eyes:      Extraocular Movements: Extraocular movements intact.   Pulmonary:      Effort: Pulmonary effort is normal. No tachypnea or respiratory distress.   Abdominal:      General: Abdomen is flat. There is no distension.   Musculoskeletal:         General: Normal range of motion.   Skin:     General: Skin is dry.      Comments: reviewed nursing noted. See images improving cellulitis. Packing in place   Neurological:      Mental Status: She is alert and oriented to person, place, and time.   Psychiatric:         Attention and Perception: Attention and perception normal.         Mood and Affect: Mood and affect normal.         Significant Labs: All pertinent labs within the past 24 hours have been reviewed.    Significant Imaging: I have reviewed all pertinent imaging results/findings within the past 24 hours.      Assessment/Plan:      * Cellulitis and abscess of buttock  S/p I&D left buttocks with Dr. Hays 1/12/22 - wound very indurated with bullous cellulitis present, penrose drain intact  -Discussed with ID: cultures growing staph.  Escalate to dapto (Vanc allergy)  -Follow CBC   -ID to follow  -Wound care consulted  -s/p bedside debridement on 1/14  -improving.  Pending approval for [facility]. Home with bactri    Chronic pain due to injury  Chronic condition  -Requiring acute pain medication for acute infection/surgery      Hypothyroidism  Chronic condition  Cont home meds      Hyperlipemia   Patient is chronically on statin.will continue for now. Monitor clinically. Last LDL was   Lab Results   Component Value Date    LDLCALC  141.6 11/17/2020              VTE Risk Mitigation (From admission, onward)         Ordered     IP VTE HIGH RISK PATIENT  Once         01/11/22 1748     Place sequential compression device  Until discontinued         01/11/22 1748                      I have assessed these finding virtually using telemed platform and with assistance of bedside nurse                 The attending portion of this evaluation, treatment, and documentation was performed per Desiree De Luna NP via Telemedicine AudioVisual using the secure [employer] software platform with 2 way audio/video. The provider was located off-site and the patient is located in the hospital. The aforementioned video software was utilized to document the relevant history and physical exam    Desiree De Luna NP  Department of Hospital Medicine   [facility]

## 2022-01-18 NOTE — PLAN OF CARE
Pt clear for DC from case management standpoint. Discharging to home       01/18/22 1503   Final Note   Assessment Type Final Discharge Note   Anticipated Discharge Disposition Home   Hospital Resources/Appts/Education Provided Appointments scheduled and added to AVS

## 2022-01-18 NOTE — NURSING
Clear for DC home. AVS reviewed with patient. IV cannula removed with tip intact. Site covered with gauze and coban. Notified to inform staff when ready to be escorted off floor.

## 2022-01-18 NOTE — ASSESSMENT & PLAN NOTE
S/p I&D left buttocks with Dr. Hays 1/12/22 - wound very indurated with bullous cellulitis present, penrose drain intact  -Discussed with ID: cultures growing staph.  Escalate to dapto (Vanc allergy)  -Follow CBC   -ID to follow  -Wound care consulted  -s/p bedside debridement on 1/14  -improving.  Pending approval for [facility] [place]. Home with bactrim

## 2022-01-18 NOTE — CONSULTS
"Final wound care assessment. Discussed plan of care with the patient and the patients significant other regarding removal of the drain as ordered by Dr. Hays and to pack the wound. Both the patient and significant other verbalized understanding regarding plan of care. Removed the pinrose drain from the left buttocks. Patients significant other suddenly began to say, "Look how red the area is, all the skin around the wounds are red".  Attempted to discuss the wound was very red last week but at this time is pink and no purulence is noted and the skin at the 2 wound openings is intact. The significant other began to become very concerned regarding the redness. Encouraged the significant other to take a picture of the wound with his phone so that he could compare the progress of this wound and he did take a picture. This nurse also took a picture and uploaded this to the patients chart for assessment validation. Educated the significant other regarding packing the wound with saline gauze. Covered the outer wound with a foam dressing as the patient is discharging for more absorptive support. The significant other then became very upset on why we would pack a wound and how can it heal if we are packing the wound with gauze. Attempted to educate the significant other regarding how a wound heals from the inside out and the migration of skin cells that occur across the wound bed and the significant other began speaking loudly saying, "I know all that and if I didn't know I could just watch it on You Tube to learn how".  Asked the significant other if he was ok and what did he want me to do but he did not answer that question. The significant other said, "Where can I buy wound care supplies?" Initially instructed him that [facility], [employer] and [employer] all had wound care supplies but he wanted a specific wound care supply company in [place]. Provided the significant other with the [employer] company " "which he immediately looked up on his computer. Patient asked if she had any questions about her plan of care for home and if she was ok and she stated, "Yes, I understand and I am ok". Patient did appear to be in good spirits and she asked her significant other "What's your problem today?" He than went on to speak to her and they were having a dialog. I interrupted as gently as possible to make sure they had no questions for me and they both stated no. The patient thanked me for taking care of her. Left the room at that time. Discussed the bedside education with the patients primary nurse and the charge nurse.   Patient cleared from a wound care standpoint to discharge at this time.           "

## 2022-01-18 NOTE — DISCHARGE INSTRUCTIONS
Wound care:  Ok to shower with soap and water.   Clean wound with wound cleanser or normal saline.   Pack wound with normal saline and gauze every day and cover.   May change twice a day if needed depending on the amount of drainage.   Patient is to follow up with Dr. Hays in 1-2 weeks at his office for a wound check

## 2022-01-18 NOTE — PLAN OF CARE
POC discussed with pt, pt verbalized understanding. Oriented x4. PIV cdi. Penrose drain intact, moderate output noted. Pt ambulated to the restroom with stand by assist. Pt medicated with prns for complaints of pain to the left buttock. Dressing changed during the shift by significant other, offered to do wound care but pt and significant other states that they would prefer to do it. Updated photo of wound uploaded. Contact isolation precautions maintained. Call light in reach, bed alarm set, safety maintained. No complaints or requests at this time, will continue to monitor.

## 2022-01-18 NOTE — PLAN OF CARE
PA for [facility] approved. CM updated Marita with [facility].     Per Marita she will call pt to schedule 1st infusion. States pt covered at 100%      1400- informed by Marita with [facility] that does not want to [place] for infusions. Would rather go to [facility] infusion    1500- outpt [facility] scheduled for 11 AM at [facility]. Updated pt- pt in agreement     01/18/22 1217   Post-Acute Status   Post-Acute Authorization IV Infusion   IV Infusion Status Set-up Complete/Auth obtained

## 2022-01-18 NOTE — SUBJECTIVE & OBJECTIVE
Interval History:  Patient reports 7/10 sacral pain improves with pain meds.   CM consulted for arrangement of outpatient [facility]. Plan for DC tomorrow.     Review of Systems   Constitutional: Negative for chills and fever.   HENT: Negative for congestion, postnasal drip, rhinorrhea and sore throat.    Respiratory: Negative for cough, chest tightness and shortness of breath.    Cardiovascular: Negative for palpitations.   Gastrointestinal: Negative for abdominal pain, nausea and vomiting.   Genitourinary: Negative for dysuria and hematuria.   Skin: Positive for color change and wound.   Neurological: Negative for dizziness, light-headedness and headaches.     Objective:     Vital Signs (Most Recent):  Temp: 96.4 °F (35.8 °C) (01/17/22 1942)  Pulse: 93 (01/17/22 1942)  Resp: 14 (Simultaneous filing. User may not have seen previous data.) (01/17/22 1942)  BP: (!) 113/58 (01/17/22 1942)  SpO2: 98 % (01/17/22 1942) Vital Signs (24h Range):  Temp:  [96.1 °F (35.6 °C)-98.7 °F (37.1 °C)] 96.4 °F (35.8 °C)  Pulse:  [85-94] 93  Resp:  [14-18] 14  SpO2:  [95 %-100 %] 98 %  BP: (100-137)/(49-66) 113/58     Weight: 67.5 kg (148 lb 13 oz)  Body mass index is 25.54 kg/m².    Intake/Output Summary (Last 24 hours) at 1/17/2022 2043  Last data filed at 1/17/2022 0500  Gross per 24 hour   Intake 650 ml   Output --   Net 650 ml      Physical Exam  Constitutional:       General: She is not in acute distress.     Appearance: Normal appearance.   HENT:      Head: Normocephalic and atraumatic.   Eyes:      Extraocular Movements: Extraocular movements intact.   Pulmonary:      Effort: Pulmonary effort is normal. No tachypnea or respiratory distress.   Abdominal:      General: Abdomen is flat. There is no distension.   Musculoskeletal:         General: Normal range of motion.   Skin:     General: Skin is dry.      Comments: reviewed nursing noted. See images improving cellulitis. Packing in place   Neurological:      Mental  Status: She is alert and oriented to person, place, and time.   Psychiatric:         Attention and Perception: Attention and perception normal.         Mood and Affect: Mood and affect normal.         Significant Labs: All pertinent labs within the past 24 hours have been reviewed.    Significant Imaging: I have reviewed all pertinent imaging results/findings within the past 24 hours.

## 2022-01-18 NOTE — NURSING
Communicated with Dr. Hays via secure chat regarding wound care status. Orders to remove the pinrose drain obtained from Dr. Hays.   Wound care orders for home are Ok to shower with soap and water. Clean wound with wound cleanser or normal saline. Pack wound with normal saline and gauze every day and cover. May change twice a day if needed. Patient is to follow up with Dr. Hays in 1-2 weeks at his office for a wound check.

## 2022-01-19 ENCOUNTER — TELEPHONE (OUTPATIENT)
Dept: MEDSURG UNIT | Facility: HOSPITAL | Age: 47
End: 2022-01-19
Payer: MEDICAID

## 2022-01-19 ENCOUNTER — INFUSION (OUTPATIENT)
Dept: INFUSION THERAPY | Facility: HOSPITAL | Age: 47
End: 2022-01-19
Attending: INTERNAL MEDICINE
Payer: MEDICAID

## 2022-01-19 DIAGNOSIS — L03.317 CELLULITIS AND ABSCESS OF BUTTOCK: ICD-10-CM

## 2022-01-19 DIAGNOSIS — L02.31 CELLULITIS AND ABSCESS OF BUTTOCK: ICD-10-CM

## 2022-01-19 LAB
BACTERIA BLD CULT: NORMAL
BACTERIA BLD CULT: NORMAL

## 2022-01-19 NOTE — PROGRESS NOTES
Pt refused to stay and wait for medication to be ready Educated on need to stay but pt refused Dr Rutledge notified

## 2022-01-19 NOTE — DISCHARGE SUMMARY
[facility] Medicine  Discharge Summary      Patient Name: Jamila Lee  MRN: [id number]  Patient Class: IP- Inpatient  Admission Date: 2022  Hospital Length of Stay: 5 days  Discharge Date and Time: 2022  5:49 PM  Attending Physician: No att. providers found   Discharging Provider: Desiree De Luna NP  Primary Care Provider: Germaine Wesley NP      HPI:   Ms CHOI is a 46 year old  female who presented to the ED with a CC of a spider bite on her buttocks. Pt reported she was bit on 1/3 or  and the area turned blood red at first then turned black where the bite was. It progressed to swelling with redness in a large area and she thought it burst and it expressed pus and watery discharge. She couldn't sit on it for 2 days then on 1/10 she felt it was a gooey mess. On 1/10 she also noticed that she got a bump on her tongue and on  she started getting bumps like small bites on her hands. She has never had fevers/chills/sweats; had a HA; had n/v. She did report that the pain in her buttocks was so bad on  that she passed out. She reported that she has multiple health problems, PMH significant for HTN, DLD, PVD, had a crush injury to pelvis and now has unsteady gait, pelvic deformity, back pain, and chronic muscle spasms to both lower legs, this also resulted in chronic pain syndrome. She has problems with anxiety and insomnia. PSH includes hysterectomy, colon surgery, pelvic fracture repair, choleycystectomy, and liver surgery. Social history: Tobacco: smokes half PPD some days, ETOH none, Illicit drugs: methadone management  Family hx: mother , father alive with cancer and heart disease. Lives alone.     Plan: admit, start abx (clindamycin), allergic to vancomycin- had it in hospital and turned bright red while getting it, consult wound care and surgery.       Procedure(s) (LRB):  INCISION AND DRAINAGE, ABSCESS AND DRAIN PLACEMENT (Left)       Hospital Course:   Pt was monitor closely during her stay she was initiated on IV Abx and underwent I and D of her a left gluteal abscess on 1/12 with Dr. Hays.  Pain medications were adjusted for adequate pain control.  Her cellulitis worsened postoperatively and Abx were escalated with the assistance of ID who evaluated Pt made recommendations.  Cultures intraoperatively are growing MRSA.  She was continued on IV daptomycin.  Wound care was consulted to evaluate Pt implement recommendations.  She underwent bedside debridement of necrotic tissue on 01/14 with surgery.    1/16:  Patient reports that she is generally feeling better.  No fever or chills reported overnight.  Mild increase in leukocytosis but otherwise no acute events overnight.  Continue IV daptomycin and management as per surgery.  Will decrease frequency of pain medications to q.6h p.r.n..  Continue current management for now.    ID recommended home with IV Delvance infusion and bactrim x 14 days. Wound care removed drain from abscess site and packed per surgery recs. Patient referred to outpatient wound care.  She is medically stable for DC.        Goals of Care Treatment Preferences:  Code Status: Full Code      Consults:   Consults (From admission, onward)        Status Ordering Provider     Inpatient consult to Social Work/Case Management  Once        Provider:  (Not yet assigned)    Completed BRIAN PORTILLO     Inpatient virtual consult to Hospital Medicine  Once        Provider:  (Not yet assigned)    Completed PIERRE CRISTINA     Inpatient consult to Infectious Diseases  Once        Provider:  Mercedes Velez MD    Completed PIERRE CRISTINA     IP consult to case management  Once        Provider:  (Not yet assigned)    Completed JEREMY CROOKS     Inpatient consult to General Surgery  Once        Provider:  (Not yet assigned)    Completed PHILIP MARKS          No new Assessment & Plan notes have been filed under this  hospital service since the last note was generated.  Service: Hospital Medicine    Final Active Diagnoses:    Diagnosis Date Noted POA    PRINCIPAL PROBLEM:  Cellulitis and abscess of buttock [L02.31, L03.317] 01/11/2022 Yes    Chronic pain due to injury [G89.21]  Yes    Hyperlipemia [E78.5]  Yes    Hypothyroidism [E03.9]  Yes      Problems Resolved During this Admission:    Diagnosis Date Noted Date Resolved POA    Hypokalemia [E87.6] 07/23/2019 01/14/2022 Yes       Discharged Condition: stable    Disposition: Home or Self Care    Follow Up:   Follow-up Information     Germaine Wesley NP On 1/20/2022.    Specialty: Family Medicine  Why: @3:20pm   Contact information:  [address] [address]  [address]  [facility] [address]  [phone]             Erwin Hays MD On 1/20/2022.    Specialty: General Surgery  Why: @1:45pm   Contact information:  [address]  [address]  [place] [address]  [phone]             India Pritchett MD In 1 week.    Specialty: Infectious Diseases  Contact information:  [address]  [address]  [place] [address]  [phone]                       Patient Instructions:      Ambulatory referral/consult to Wound Clinic   Standing Status: Future   Referral Priority: Routine Referral Type: Consultation   Referral Reason: Specialty Services Required   Requested Specialty: Wound Care   Number of Visits Requested: 1     Ambulatory referral/consult to Infusion Dept   Standing Status: Future   Referral Priority: Routine Referral Type: Consultation   Referral Reason: Specialty Services Required   Number of Visits Requested: 1     Diet Cardiac     Notify your health care provider if you experience any of the following:  temperature >100.4     Notify your health care provider if you experience any of the following:  severe uncontrolled pain     Notify your health care provider if you experience any of the following:  redness, tenderness, or signs of infection (pain, swelling, redness, odor  or green/yellow discharge around incision site)     Activity as tolerated       Significant Diagnostic Studies: Labs:   BMP:   Recent Labs   Lab 01/18/22  0936   GLU 75      K 4.7   CL 98   CO2 29   BUN 13   CREATININE 0.8   CALCIUM 10.0    and CMP   Recent Labs   Lab 01/18/22  0936      K 4.7   CL 98   CO2 29   GLU 75   BUN 13   CREATININE 0.8   CALCIUM 10.0   PROT 6.1   ALBUMIN 3.1*   BILITOT 0.1   ALKPHOS 116   AST 36   ALT 41   ANIONGAP 9   ESTGFRAFRICA >60   EGFRNONAA >60       Pending Diagnostic Studies:     None         Medications:  Reconciled Home Medications:      Medication List      START taking these medications    * DALVANCE 500 mg Soln injection  Generic drug: dalbavancin  Inject 75 mLs (1,500 mg total) into the vein once. for 1 dose     * dalbavancin 500 mg Soln injection  Commonly known as: DALVANCE  Inject 75 mLs (1,500 mg total) into the vein once. for 1 dose     ondansetron 4 MG tablet  Commonly known as: ZOFRAN  Take 1 tablet (4 mg total) by mouth every 8 (eight) hours as needed for Nausea.     oxyCODONE-acetaminophen 5-325 mg per tablet  Commonly known as: PERCOCET  Take 1 tablet by mouth every 6 (six) hours as needed for Pain.     sulfamethoxazole-trimethoprim 800-160mg 800-160 mg Tab  Commonly known as: BACTRIM DS  Take 1 tablet by mouth 2 (two) times daily. for 14 days         * This list has 2 medication(s) that are the same as other medications prescribed for you. Read the directions carefully, and ask your doctor or other care provider to review them with you.            CONTINUE taking these medications    albuterol 90 mcg/actuation inhaler  Commonly known as: PROVENTIL/VENTOLIN HFA  Inhale 1-2 puffs into the lungs every 4 (four) hours as needed for Wheezing. Rescue     atorvastatin 10 MG tablet  Commonly known as: LIPITOR  Take 1 tablet (10 mg total) by mouth once daily.     baclofen 10 MG tablet  Commonly known as: LIORESAL  Take 1 tablet by mouth every evening.      butalbital-acetaminophen-caffeine -40 mg -40 mg per tablet  Commonly known as: FIORICET, ESGIC  TAKE 1 TABLET EVERY 4 HOURS AS NEEDED     diazePAM 10 MG Tab  Commonly known as: VALIUM  TAKE 1 TABLET (10 MG TOTAL) BY MOUTH TWO TIMES A DAY.     FLUoxetine 20 MG capsule  Take 1 capsule (20 mg total) by mouth 2 (two) times daily.     gabapentin 800 MG tablet  Commonly known as: NEURONTIN     ibuprofen 600 MG tablet  Commonly known as: IBU  Take 1 tablet (600 mg total) by mouth 3 (three) times daily.     ketoconazole 2 % shampoo  Commonly known as: NIZORAL  APPLY TOPICALLY TWICE A WEEK.     lamoTRIgine 25 MG tablet  Commonly known as: LAMICTAL  Take 2 tablets by mouth every evening.     levothyroxine 150 MCG tablet  Commonly known as: SYNTHROID  Take 1 tablet (150 mcg total) by mouth once daily.     mirtazapine 30 MG tablet  Commonly known as: REMERON  Take 1 tablet (30 mg total) by mouth every evening.     ondansetron 8 MG Tbdl  Commonly known as: ZOFRAN-ODT  Take 1 tablet (8 mg total) by mouth every 6 (six) hours as needed.     prazosin 2 MG Cap  Commonly known as: MINIPRESS  TAKE 2 CAPSULES (4 MG) BY MOUTH EVERY EVENING.     propranoloL 10 MG tablet  Commonly known as: INDERAL  Take 1 tablet (10 mg total) by mouth 2 (two) times daily.     rizatriptan 10 MG disintegrating tablet  Commonly known as: MAXALT-MLT  TAKE 1 TABLET (10 MG TOTAL) BY MOUTH AS NEEDED FOR MIGRAINE.     temazepam 30 mg capsule  Commonly known as: RESTORIL  Take 1 capsule (30 mg total) by mouth nightly as needed for Insomnia.            Indwelling Lines/Drains at time of discharge:   Lines/Drains/Airways     None                 Time spent on the discharge of patient: 45 minutes         The attending portion of this evaluation, treatment, and documentation was performed per Desiree De Luna NP via Telemedicine AudioVisual using the secure Vidyo software platform with 2 way audio/video. The provider was located off-site and the  patient is located in the hospital. The aforementioned video software was utilized to document the relevant history and physical exam    Desiree De Luna NP  Department of [facility] Medicine  [facility]

## 2022-01-24 ENCOUNTER — HOSPITAL ENCOUNTER (EMERGENCY)
Facility: HOSPITAL | Age: 47
Discharge: HOME | End: 2022-01-24
Attending: EMERGENCY MEDICINE
Payer: MEDICARE

## 2022-01-24 VITALS
HEART RATE: 83 BPM | TEMPERATURE: 98 F | DIASTOLIC BLOOD PRESSURE: 85 MMHG | SYSTOLIC BLOOD PRESSURE: 147 MMHG | HEIGHT: 64 IN | WEIGHT: 140 LBS | OXYGEN SATURATION: 97 % | RESPIRATION RATE: 17 BRPM | BODY MASS INDEX: 23.9 KG/M2

## 2022-01-24 DIAGNOSIS — S09.90XA CLOSED HEAD INJURY, INITIAL ENCOUNTER: ICD-10-CM

## 2022-01-24 DIAGNOSIS — S00.83XA TRAUMATIC HEMATOMA OF FOREHEAD, INITIAL ENCOUNTER: Primary | ICD-10-CM
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
## 2022-01-24 PROCEDURE — 25000003 PHARM REV CODE 250: Performed by: EMERGENCY MEDICINE

## 2022-01-24 PROCEDURE — 99284 EMERGENCY DEPT VISIT MOD MDM: CPT | Mod: 25

## 2022-01-24 RX ORDER — IBUPROFEN 400 MG/1
400 TABLET ORAL
Status: COMPLETED | OUTPATIENT
Start: 2022-01-24 | End: 2022-01-24

## 2022-01-24 RX ORDER — ACETAMINOPHEN 500 MG
1000 TABLET ORAL
Status: COMPLETED | OUTPATIENT
Start: 2022-01-24 | End: 2022-01-24

## 2022-01-24 RX ADMIN — IBUPROFEN 400 MG: 400 TABLET ORAL at 05:01

## 2022-01-24 RX ADMIN — ACETAMINOPHEN 1000 MG: 500 TABLET, FILM COATED ORAL at 05:01

## 2022-01-24 NOTE — DISCHARGE INSTRUCTIONS
Get plenty of rest and use ice packs to help with swelling.  You may take Tylenol or ibuprofen per package instructions for headaches.  Slowly return to your normal activity.  If you have headaches, nausea, dizziness or any other symptoms than go to the activity level of the day before.  If you have persistent symptoms follow-up with neurology.  Return for worsening symptoms.
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
## 2022-01-25 ENCOUNTER — TELEPHONE (OUTPATIENT)
Dept: INFECTIOUS DISEASES | Facility: CLINIC | Age: 47
End: 2022-01-25

## 2022-01-25 DIAGNOSIS — L03.317 CELLULITIS AND ABSCESS OF BUTTOCK: Primary | ICD-10-CM

## 2022-01-25 DIAGNOSIS — L02.31 CELLULITIS AND ABSCESS OF BUTTOCK: Primary | ICD-10-CM

## 2022-01-25 RX ORDER — SULFAMETHOXAZOLE AND TRIMETHOPRIM 800; 160 MG/1; MG/1
1 TABLET ORAL 2 TIMES DAILY
Qty: 28 TABLET | Refills: 0 | Status: SHIPPED | OUTPATIENT
Start: 2022-01-25 | End: 2022-02-08
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
## 2022-01-25 NOTE — TELEPHONE ENCOUNTER
I tried calling wound care 3 times with no answer   I printed and faxed the orders with a request to schedule patient soonest

## 2022-05-07 ENCOUNTER — HOSPITAL ENCOUNTER (EMERGENCY)
Facility: HOSPITAL | Age: 47
Discharge: HOME | End: 2022-05-07
Attending: EMERGENCY MEDICINE
Payer: COMMERCIAL

## 2022-05-07 VITALS
HEART RATE: 77 BPM | WEIGHT: 140 LBS | DIASTOLIC BLOOD PRESSURE: 71 MMHG | HEIGHT: 64 IN | BODY MASS INDEX: 23.9 KG/M2 | RESPIRATION RATE: 20 BRPM | TEMPERATURE: 98 F | SYSTOLIC BLOOD PRESSURE: 138 MMHG | OXYGEN SATURATION: 100 %

## 2022-05-07 DIAGNOSIS — K04.7 DENTAL ABSCESS: Primary | ICD-10-CM

## 2022-05-07 PROCEDURE — 99284 EMERGENCY DEPT VISIT MOD MDM: CPT

## 2022-05-07 PROCEDURE — 25000003 PHARM REV CODE 250: Performed by: PHYSICIAN ASSISTANT

## 2022-05-07 RX ORDER — PENICILLIN V POTASSIUM 500 MG/1
500 TABLET, FILM COATED ORAL
Status: COMPLETED | OUTPATIENT
Start: 2022-05-07 | End: 2022-05-07

## 2022-05-07 RX ORDER — NAPROXEN 500 MG/1
500 TABLET ORAL 2 TIMES DAILY WITH MEALS
Qty: 14 TABLET | Refills: 0 | Status: SHIPPED | OUTPATIENT
Start: 2022-05-07 | End: 2022-05-14

## 2022-05-07 RX ORDER — PENICILLIN V POTASSIUM 500 MG/1
500 TABLET, FILM COATED ORAL 4 TIMES DAILY
Qty: 40 TABLET | Refills: 0 | Status: SHIPPED | OUTPATIENT
Start: 2022-05-07 | End: 2022-05-14

## 2022-05-07 RX ORDER — HYDROCODONE BITARTRATE AND ACETAMINOPHEN 5; 325 MG/1; MG/1
1 TABLET ORAL
Status: COMPLETED | OUTPATIENT
Start: 2022-05-07 | End: 2022-05-07

## 2022-05-07 RX ADMIN — PENICILLIN V POTASSIUM 500 MG: 500 TABLET, FILM COATED ORAL at 02:05

## 2022-05-07 RX ADMIN — HYDROCODONE BITARTRATE AND ACETAMINOPHEN 1 TABLET: 5; 325 TABLET ORAL at 02:05

## 2022-05-07 NOTE — DISCHARGE INSTRUCTIONS
See a dentist next week.  Take antibiotics and pain medication as prescribed.  For worsening symptoms, chest pain, shortness of breath, increased abdominal pain, high grade fever, stroke or stroke like symptoms, immediately go to the nearest Emergency Room or call 911 as soon as possible.
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
## 2022-05-31 ENCOUNTER — PATIENT MESSAGE (OUTPATIENT)
Dept: ADMINISTRATIVE | Facility: HOSPITAL | Age: 47
End: 2022-05-31
Payer: COMMERCIAL
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
## 2023-03-28 ENCOUNTER — TELEPHONE (OUTPATIENT)
Dept: FAMILY MEDICINE | Facility: CLINIC | Age: 48
End: 2023-03-28

## 2023-03-28 NOTE — TELEPHONE ENCOUNTER
----- Message from Clementine Turner MA sent at 3/28/2023 12:29 PM CDT -----  Regarding: possible UTI  Complains of  cloudy brown w/reddish tint and clotting urine. Also complains of back pain. Would like to be seen asap.  [phone]

## 2023-03-28 NOTE — TELEPHONE ENCOUNTER
Spoke with patient who states she just wants to get scheduled for a new patient appointment. I did let her know the next available new patient appoinment is quite a few months away and for this issue we would recommend an urgent care to check for UTI. She agrees. Aware Herlinda will be calling her in the next week or so for next available new patient appt.

## (undated) DEVICE — NDL SAFETY 21G X 1 1/2 ECLPSE

## (undated) DEVICE — TOWEL OR DISP STRL BLUE 4/PK

## (undated) DEVICE — SEE MEDLINE ITEM 157117

## (undated) DEVICE — STRAP OR TABLE 5IN X 72IN

## (undated) DEVICE — GLOVE SURG ULTRA TOUCH 7.5

## (undated) DEVICE — SLEEVE SCD EXPRESS KNEE MEDIUM

## (undated) DEVICE — SEE MEDLINE ITEM 146292

## (undated) DEVICE — SOL 9P NACL IRR PIC IL

## (undated) DEVICE — SPONGE BULKEE II ABSRB 6X6.75

## (undated) DEVICE — SUT 3-0 VICRYL / SH (J416)

## (undated) DEVICE — BLADE SURG CARBON STEEL SZ11

## (undated) DEVICE — SOL PVP-I SCRUB 7.5% 4OZ

## (undated) DEVICE — SCRUB 10% POVIDONE IODINE 4OZ

## (undated) DEVICE — TRAY DRY SPONGE SCRUB W FOAM

## (undated) DEVICE — DRESSING TRANS 4X4 TEGADERM

## (undated) DEVICE — SYR 10CC LUER LOCK

## (undated) DEVICE — ELECTRODE REM PLYHSV RETURN 9

## (undated) DEVICE — PACK CUSTOM UNIV BASIN SLI